# Patient Record
Sex: MALE | Race: WHITE | Employment: OTHER | URBAN - METROPOLITAN AREA
[De-identification: names, ages, dates, MRNs, and addresses within clinical notes are randomized per-mention and may not be internally consistent; named-entity substitution may affect disease eponyms.]

---

## 2017-01-03 ENCOUNTER — TELEPHONE (OUTPATIENT)
Dept: ONCOLOGY | Age: 82
End: 2017-01-03

## 2017-01-03 NOTE — TELEPHONE ENCOUNTER
[id number] [facility] Nutrition Therapy   [phone]      Nutrition Plan and Goals    Continue to emphasize PO intake  Aim for 64oz fluids     Goal: Weight maintenance   Nutrition Assessment   Called to check in on Mr. Loletta Felty. He reports having a good holiday and that his family has been good about nagging him to eat and drink. He reports eating well but unable to gain weight. He reports drinking more ensure and that the chocolate actually has more flavor. However, after further inquiry he is only drinking 1-2 a week. He reports this morning have pancakes, 2 large sausage patties, eggs and a glass of whole milk but then reports skipping lunch. He states Fermín Haji are you also so specific on amounts\". He states swallowing has been the best over the last 2 days. 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       Delores Rojas, [address] [place] [address], [address] [place] [address] , [address]

## 2017-01-18 ENCOUNTER — TELEPHONE (OUTPATIENT)
Dept: ONCOLOGY | Age: 82
End: 2017-01-18

## 2017-01-18 NOTE — TELEPHONE ENCOUNTER
[id number] [facility] Nutrition Therapy   [phone]      Nutrition Plan and Goals    Continue to emphasize PO intake  Aim for 64oz fluids     Goal: Weight maintenance   Nutrition Assessment   Called to check in on Mr. Suleiman Bustamante. PO intake: Eating 3 meals a day, back to normal size portions. Still struggling with altered taste. Reports no restrictions on food, denies difficulty swallowing. He is not consuming any supplements (ensure/boost)    Fluid intake: Reports drinking about 20-25 ounces of water per day and 15-20oz of whole milk daily. Also consuming coffee/tea. Weight: States he may have gained a pound. Denies weight loss. Bowels: Taking miralax frequently to promote regular BMs. Pain:  Reports left side discomfort in the back of his throat is bothersome. He went to the dentist last week and the dentist was not able to see any ulcer or irritated area. Mr. Suleiman Bustamante wonders if the ulcer is father back down towards the back of his tongue.     - He is gargling with salt water. Denies fevers. 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

## 2017-01-24 ENCOUNTER — TELEPHONE (OUTPATIENT)
Dept: ONCOLOGY | Age: 82
End: 2017-01-24

## 2017-01-24 NOTE — TELEPHONE ENCOUNTER
Spoke to patient to return call. He was having constipation problem again. He was able to get in touch with Papa Freedman who recommended laxative use. Will try Mg Citrate tomorrow morning. No nausea or vomiting. Seen PCP for gout, he recommended drinking more water. He is trying. He is feeling good overall. Getting metal taste in mouth, over 8 weeks since last treatment. This is disappointing for him. Encouraged him to call with any concerns. Reviewed f/u appointments. No changes needed.

## 2017-01-24 NOTE — TELEPHONE ENCOUNTER
Voicemail: 1/24/17 @ 3:20pm    Patient calling to get in touch with [employer] or "[employer]. No specifics left. Please call the patient back at [phone].

## 2017-01-25 NOTE — TELEPHONE ENCOUNTER
[id number] [facility] Nutrition Therapy   [phone]    Nutrition    Mr Yesenia Olivas called asking what the name of the laxative that was given to him by NP. Reviewed medications and suspected medication was OTC, reviewed medications to help with constipation. He has tried a stool softener and miralax. He picked up Magnesium citrate to try and after further discussion, he believes this is the laxative that was previously recommended. He went to his PCP about his gout and was encouraged to consume 64oz of fluids. He continues to have a metallic taste but reports eating and has gained 1-2lb.           Anais Baltazar, [address] [place] [address], [address] [place] [address] , [address]

## 2017-02-13 ENCOUNTER — HOSPITAL ENCOUNTER (OUTPATIENT)
Dept: PET IMAGING | Age: 82
Discharge: HOME | End: 2017-02-13
Attending: NURSE PRACTITIONER
Payer: MEDICARE

## 2017-02-13 VITALS — BODY MASS INDEX: 22.88 KG/M2 | HEIGHT: 68 IN | WEIGHT: 151 LBS

## 2017-02-13 DIAGNOSIS — C09.9 TONSIL CANCER (HCC): ICD-10-CM

## 2017-02-13 PROCEDURE — 78815 PET IMAGE W/CT SKULL-THIGH: CPT

## 2017-02-13 RX ORDER — SODIUM CHLORIDE 0.9 % (FLUSH) 0.9 %
10 SYRINGE (ML) INJECTION
Status: COMPLETED | OUTPATIENT
Start: 2017-02-13 | End: 2017-02-13

## 2017-02-13 RX ADMIN — Medication 10 ML: at 09:51

## 2017-02-13 NOTE — PROGRESS NOTES
Please let patient know PET scan looks great. No areas of abnormal hypermetabolism.  Will be reviewed in more detail at f/u in a few weeks

## 2017-02-14 ENCOUNTER — TELEPHONE (OUTPATIENT)
Dept: ONCOLOGY | Age: 82
End: 2017-02-14

## 2017-02-14 NOTE — TELEPHONE ENCOUNTER
----- Message from [place] NP sent at 2/13/2017  2:22 PM EST -----  Please let patient know PET scan looks great. No areas of abnormal hypermetabolism.  Will be reviewed in more detail at f/u in a few weeks

## 2017-02-14 NOTE — TELEPHONE ENCOUNTER
----- Message from Yanni Talbert NP sent at 2/13/2017  2:22 PM EST -----  Please let patient know PET scan looks great. No areas of abnormal hypermetabolism.  Will be reviewed in more detail at f/u in a few weeks

## 2017-02-20 ENCOUNTER — TELEPHONE (OUTPATIENT)
Dept: ONCOLOGY | Age: 82
End: 2017-02-20

## 2017-02-20 NOTE — TELEPHONE ENCOUNTER
Patient states that he had something \"pop up in his throat\" and is requesting to speak to [address]  about it.

## 2017-02-20 NOTE — TELEPHONE ENCOUNTER
Voicemail: 2/20/17 @ 9:21am    Patient calling to speak to [address] . No specifics given. Please call the patient back at [phone]. Thanks.

## 2017-02-20 NOTE — TELEPHONE ENCOUNTER
Spoke to patient. States seen by PCP, was diagnosed with infection in salivary glands. Started on antibiotic therapy. Inquired regarding f/u with Dr. Alex Salas. States he hasn't f/u since completion of therapy. Advised we need to get him f/u so she can evaluate as well. He has f/u with our office and Dr. Humberto Dietrich on Friday 10am with us, 1130 with Dr. Vickey Cooks. Call to Dr. January Murphy office. Able to get him in on Friday at 2:45. Call to patient, busy. Call back, left message regarding appt date/time with contact information to Dr. January Murphy office.

## 2017-02-21 ENCOUNTER — TELEPHONE (OUTPATIENT)
Dept: ONCOLOGY | Age: 82
End: 2017-02-21

## 2017-02-21 NOTE — TELEPHONE ENCOUNTER
[facility] Patient is okay with the appointment on Friday at 2:30 with Dr. Dennie Dada.

## 2017-02-24 ENCOUNTER — OFFICE VISIT (OUTPATIENT)
Dept: ONCOLOGY | Age: 82
End: 2017-02-24

## 2017-02-24 ENCOUNTER — TELEPHONE (OUTPATIENT)
Dept: ONCOLOGY | Age: 82
End: 2017-02-24

## 2017-02-24 VITALS
TEMPERATURE: 97.8 F | WEIGHT: 152.8 LBS | HEIGHT: 68 IN | OXYGEN SATURATION: 100 % | HEART RATE: 78 BPM | BODY MASS INDEX: 23.16 KG/M2 | SYSTOLIC BLOOD PRESSURE: 148 MMHG | RESPIRATION RATE: 16 BRPM | DIASTOLIC BLOOD PRESSURE: 89 MMHG

## 2017-02-24 DIAGNOSIS — C09.9 TONSIL CANCER (HCC): Primary | ICD-10-CM

## 2017-02-24 DIAGNOSIS — K86.2 PANCREATIC CYST: ICD-10-CM

## 2017-02-24 RX ORDER — PREDNISOLONE 15 MG/5ML
SOLUTION ORAL
COMMUNITY
Start: 2016-12-27 | End: 2017-04-25

## 2017-02-24 RX ORDER — AMOXICILLIN AND CLAVULANATE POTASSIUM 875; 125 MG/1; MG/1
TABLET, FILM COATED ORAL 2 TIMES DAILY
COMMUNITY
Start: 2017-02-20 | End: 2017-04-25 | Stop reason: ALTCHOICE

## 2017-02-24 RX ORDER — PREDNISONE 10 MG/1
TABLET ORAL
COMMUNITY
Start: 2017-01-20 | End: 2017-04-25

## 2017-02-24 RX ORDER — PREDNISONE 20 MG/1
TABLET ORAL
COMMUNITY
Start: 2017-01-16 | End: 2017-04-25

## 2017-02-24 NOTE — PROGRESS NOTES
Chief Complaint   Patient presents with    Other     Tonsil Cancer    Other     PET and CT scan results     1. Have you been to the ER, urgent care clinic since your last visit? Hospitalized since your last visit? No    2. Have you seen or consulted any other health care providers outside of the [address] [place] [address] since your last visit? Include any pap smears or colon screening.  Dr. Carli Cardona

## 2017-02-24 NOTE — PROGRESS NOTES
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 Shahnaz Saldana is a 80 y.o. male who presents for follow-up of tonsil cancer. In summer 2016 he noticed a mass in his left neck. He saw Dr. Cheryl Shi in ENT. After a trial of antibiotics he had an FNA revealing SCC in 7/20/16. Quit smoking in 2016, but was never a heavy smoker. He had treatment as above. Presents today for follow-up. Hospitalized from 11/25/16-12/1/16 due to dehydration. Feeling much better since hospitalization. Appetite has been picking up. Mild fatigue. Some insomnia. It is difficult to get food in due to decrease in saliva. No cough, SOB. Mild SOB. No pain. Otherwise, complete ROS is per the symptom report form which has been scanned into the media section of the electronic medical record. Past Medical History:   Diagnosis Date    GERD (gastroesophageal reflux disease)     Raynaud disease     Tonsil cancer ([facility] [address]        Past Surgical History:   Procedure Laterality Date    HX GI      colonscopy    HX HEENT      HX OTHER SURGICAL      Bilateral Inguinal Hernias    HX TONSILLECTOMY      August 2016    HX UROLOGICAL      vasectomy    HX VASCULAR ACCESS         Allergies   Allergen Reactions    Talwin [Pentazocine Lactate] Unknown (comments)     Cant remember, happened 40 years ago       Current Outpatient Prescriptions   Medication Sig Dispense Refill    amoxicillin-clavulanate (AUGMENTIN) 875-125 mg per tablet two (2) times a day.  artificial saliva (MOUTH KOTE) spra Take 1 Spray by mouth as needed for Other. 240 mL 1    lidocaine (XYLOCAINE) 2 % solution Take 15 mL by mouth as needed for Pain.  Indications: MOUTH IRRITATION, chemoradiation induced mucopharyngitis 1 Bottle 2    polyethylene glycol (MIRALAX) 17 gram packet Take 1 Packet by mouth daily. 30 Packet 2    magnesium oxide 400 mg cap Take 400 mg by mouth daily. 30 Cap 1    zolpidem (AMBIEN) 10 mg tablet Take 10 mg by mouth nightly as needed for Sleep.  prednisoLONE (PRELONE) 15 mg/5 mL syrup       predniSONE (DELTASONE) 20 mg tablet       predniSONE (DELTASONE) 10 mg tablet       aluminum-magnesium hydroxide 200-200 mg/5 mL susp 30 mL, diphenhydrAMINE 12.5 mg/5 mL elix 75 mg, lidocaine 2 % soln 30 mL oral suspension (compounded) Take 5 mL by mouth Before breakfast, lunch, dinner and at bedtime. 1 Bottle 2       Social History     Social History    Marital status:      Spouse name: N/A    Number of children: N/A    Years of education: N/A     Social History Main Topics    Smoking status: Former Smoker    Smokeless tobacco: Never Used    Alcohol use Yes    Drug use: None    Sexual activity: Not Asked     Other Topics Concern    None     Social History Narrative       Family History   Problem Relation Age of Onset    Dementia Mother     Parkinson's Disease Father        ROS  As per the HPI, otherwise a comprehensive ROS is negative.   ECOG PS is 1    Physical Examination:   Visit Vitals    /89 (BP 1 Location: Left arm, BP Patient Position: Sitting)    Pulse 78    Temp 97.8 °F (36.6 °C) (Oral)    Resp 16    Ht 5' 8\" (1.727 m)    Wt 152 lb 12.8 oz (69.3 kg)    SpO2 100%    BMI 23.23 kg/m2     General appearance - alert, well appearing, and in no distress  Mental status - oriented to person, place, and time  Mouth - mucous membranes moist, no sign of cancer  Neck - supple, no adenopathy, there is some fullness in the submental area  Chest - clear to auscultation, no wheezes, rales or rhonchi, symmetric air entry  Heart - normal rate, regular rhythm, normal S1, S2, no murmurs, rubs, clicks or gallops  Abdomen - soft, nontender, nondistended, bowel sounds present  Neurological - normal speech, no focal findings or movement disorder noted  Musculoskeletal - no joint tenderness, deformity or swelling  Extremities - peripheral pulses normal, no pedal edema, no clubbing or cyanosis  Skin - normal coloration and turgor, no rashes, no suspicious skin lesions noted    LABS  Lab Results   Component Value Date/Time    WBC 4.1 11/27/2016 06:53 AM    HGB 9.5 11/27/2016 06:53 AM    HCT 26.7 11/27/2016 06:53 AM    PLATELET 178 48/43/2817 06:53 AM    MCV 89.9 11/27/2016 06:53 AM     Lab Results   Component Value Date/Time    Sodium 137 12/01/2016 09:02 AM    Potassium 3.9 12/01/2016 09:02 AM    Chloride 100 12/01/2016 09:02 AM    CO2 29 12/01/2016 09:02 AM    Anion gap 8 12/01/2016 09:02 AM    Glucose 90 12/01/2016 09:02 AM    BUN 7 12/01/2016 09:02 AM    Creatinine 0.67 12/01/2016 09:02 AM    BUN/Creatinine ratio 10 12/01/2016 09:02 AM    GFR est AA >60 12/01/2016 09:02 AM    GFR est non-AA >60 12/01/2016 09:02 AM    Calcium 7.8 12/01/2016 09:02 AM    Bilirubin, total 0.3 11/26/2016 02:32 AM    AST (SGOT) 18 11/26/2016 02:32 AM    Alk. 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 Margins are close, but negative. T1.   FNA of left neck mass on 7/20/16 with squamous cell carcinoma. P16 positive. IMAGING  PET on 2/13/17 with no foci of abnormal hypermetabolism. There is a 1.5 cm cystic lesion projected off the body of the pancreas. PET on 8/2/16 with increased metabolic activity in the enlarged left level 2 lymph node with SUV of 11 suspicious for metastatic disease. There is minimal asymmetric increased activity in the left palatine tonsil compared to the right without an obvious mass.     ASSESSMENT  Mr. Leatha Quiros is a 80 y.o. 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 DISCUSSION/PLAN  1. Tonsil cancer. PET scan from 2/13/17 reviewed in detail. No sign of recurrence clinically or on the PET scan. Sees ENT and rad onc today. Discussed routine surveillance. 2. Weight loss. Supportive visit with dietician in office. Recommendations reviewed. 3. Pancreatic cyst.  Reviewed PET in detail and looked at the images with the patient. This has grown since his last PET. I have ordered a triple phase CT and referred to GI. Of note, there is a CT in his chart from June 2015 which comments on a 5mm pancreatic cyst.  Unfortunately, this is the wrong Charlsie Meme. I have asked that the CT be removed. However, the current PET scan seems to be the correct patient. 4. Port. Will keep this in until we sort out what is going on with his pancreas. He is eager to get it out, though. Not interested in flushes right now. Follow up in 2 months, sooner if needed.     Henry Eubanks MD

## 2017-02-24 NOTE — PROGRESS NOTES
[id number] [facility] Therapy   [phone]      Nutrition Plan and Goals    Continue to emphasize PO intake  Aim for 64oz fluids   SLP referral     Goal: Weight maintenance   Nutrition Assessment   Met with patient and wife this morning. PO intake: Over the past week, he reports his intake has declined due to salvary gland infection. He was started on antibiotics earlier this week. He reports increased swelling under his chin, does not cause difficulty swallowing just slows down his PO intake. He is still attempting 3 meals a day but portion sizes have decreased. He continues to drink about 16oz of whole milk per day or more. He reports difficulty with chewing/swallowing due to lack of salivary. Reports he feels like he is choking more often on liquids and saliva. He is not consuming any supplements (ensure/boost). Reports yesterday having oatmeal for breakfast, a sandwich for lunch (cannot recall details) and roast beef with potatoes and roll for dinner. Fluid intake: Reports drinking about 40-50 ounces of water per day and 15oz of whole milk daily. Also consuming coffee/tea. Weight: He has gained 6# in the past 2 months. 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

## 2017-03-14 ENCOUNTER — TELEPHONE (OUTPATIENT)
Dept: ONCOLOGY | Age: 82
End: 2017-03-14

## 2017-03-14 NOTE — TELEPHONE ENCOUNTER
[address] [facility] Nutrition Therapy   [phone]    Nutrition    Called and left a message with Mr. Kerrie Uribe. Provided him with the number for [facility] speech therapy ([phone]). Called [facility] to see if patient scheduled appointment, he has not. They did not receive referral from February but do have referral from October and still valid. Will most recent notes to [address] [place] [address]. Plan:  Patient to call and schedule appointment with SLP for dysphagia and post treatment assessment.       Emir Talbert, [address], [address] [place] , [address]

## 2017-03-17 ENCOUNTER — HOSPITAL ENCOUNTER (OUTPATIENT)
Dept: MRI IMAGING | Age: 82
Discharge: HOME | End: 2017-03-17
Attending: INTERNAL MEDICINE
Payer: MEDICARE

## 2017-03-17 DIAGNOSIS — K86.2 PANCREATIC CYST: ICD-10-CM

## 2017-03-17 LAB — CREAT BLD-MCNC: 0.9 MG/DL (ref 0.6–1.3)

## 2017-03-17 PROCEDURE — 82565 ASSAY OF CREATININE: CPT

## 2017-03-17 PROCEDURE — 74011250636 HC RX REV CODE- 250/636: Performed by: INTERNAL MEDICINE

## 2017-03-17 PROCEDURE — 74011000258 HC RX REV CODE- 258: Performed by: INTERNAL MEDICINE

## 2017-03-17 PROCEDURE — A9585 GADOBUTROL INJECTION: HCPCS | Performed by: INTERNAL MEDICINE

## 2017-03-17 PROCEDURE — 74182 MRI ABDOMEN W/CONTRAST: CPT

## 2017-03-17 RX ADMIN — SODIUM CHLORIDE 100 ML: 900 INJECTION, SOLUTION INTRAVENOUS at 18:00

## 2017-03-17 RX ADMIN — GADOBUTROL 7.5 ML: 604.72 INJECTION INTRAVENOUS at 18:00

## 2017-03-21 ENCOUNTER — TELEPHONE (OUTPATIENT)
Dept: ONCOLOGY | Age: 82
End: 2017-03-21

## 2017-03-21 ENCOUNTER — DOCUMENTATION ONLY (OUTPATIENT)
Dept: ONCOLOGY | Age: 82
End: 2017-03-21

## 2017-03-21 NOTE — TELEPHONE ENCOUNTER
[facility], they need a diagnostic code on the rx that was faxed over.  Then need it re faxed [phone]

## 2017-03-21 NOTE — TELEPHONE ENCOUNTER
Pt needs a rx for speech at [facility] rx needs to say    speech Eval and treat  please  fax to  [phone]

## 2017-04-13 ENCOUNTER — TELEPHONE (OUTPATIENT)
Dept: ONCOLOGY | Age: 82
End: 2017-04-13

## 2017-04-18 ENCOUNTER — TELEPHONE (OUTPATIENT)
Dept: ONCOLOGY | Age: 82
End: 2017-04-18

## 2017-04-18 NOTE — TELEPHONE ENCOUNTER
Returned fax to [address] [place] [address]. [facility] 53 OP Charting Report.   Front to scan

## 2017-04-19 ENCOUNTER — TELEPHONE (OUTPATIENT)
Dept: ONCOLOGY | Age: 82
End: 2017-04-19

## 2017-04-19 NOTE — TELEPHONE ENCOUNTER
HIPAA verified    Last procedure was MRI. We wanted to follow up on the tail of pancrease. He saw Ligia Daly. Opted to wait. He wanted to speak to Ligia Daly later. Never heard from Ligia Daly, it's been a week. He has called twice. Advised I would help facilitate that appointment. Call to Ligia Daly office [phone]. Left message with scheduling after speaking to . Asked them to call patient to schedule.

## 2017-04-19 NOTE — TELEPHONE ENCOUNTER
Patient is calling again regarding miscommunication with our doctors here and his primary care physician.    Thank you,  Elsy Ferreira

## 2017-04-19 NOTE — TELEPHONE ENCOUNTER
Patient called in response to last call. He understands he should be expecting a call from Dr. Domínguez Blend office for scheduling. He requests results of MRCP to be faxed to PCP as well, will send this over now. Office f/u here next week Tuesday.

## 2017-04-25 ENCOUNTER — OFFICE VISIT (OUTPATIENT)
Dept: ONCOLOGY | Age: 82
End: 2017-04-25

## 2017-04-25 ENCOUNTER — HOSPITAL ENCOUNTER (OUTPATIENT)
Dept: INFUSION THERAPY | Age: 82
Discharge: HOME | End: 2017-04-25
Payer: MEDICARE

## 2017-04-25 VITALS
SYSTOLIC BLOOD PRESSURE: 151 MMHG | OXYGEN SATURATION: 98 % | HEART RATE: 85 BPM | DIASTOLIC BLOOD PRESSURE: 84 MMHG | TEMPERATURE: 97.5 F | RESPIRATION RATE: 16 BRPM | BODY MASS INDEX: 23.01 KG/M2 | HEIGHT: 68 IN | WEIGHT: 151.8 LBS

## 2017-04-25 VITALS
TEMPERATURE: 96.8 F | DIASTOLIC BLOOD PRESSURE: 80 MMHG | HEART RATE: 69 BPM | SYSTOLIC BLOOD PRESSURE: 148 MMHG | RESPIRATION RATE: 16 BRPM

## 2017-04-25 DIAGNOSIS — K86.89 PANCREATIC MASS: ICD-10-CM

## 2017-04-25 DIAGNOSIS — C09.9 TONSIL CANCER (HCC): Primary | ICD-10-CM

## 2017-04-25 DIAGNOSIS — R43.2 DYSGEUSIA: ICD-10-CM

## 2017-04-25 LAB
ALBUMIN SERPL BCP-MCNC: 3.9 G/DL (ref 3.5–5)
ALBUMIN/GLOB SERPL: 1.3 {RATIO} (ref 1.1–2.2)
ALP SERPL-CCNC: 76 U/L (ref 45–117)
ALT SERPL-CCNC: 13 U/L (ref 12–78)
ANION GAP BLD CALC-SCNC: 4 MMOL/L (ref 5–15)
AST SERPL W P-5'-P-CCNC: 12 U/L (ref 15–37)
BASOPHILS # BLD AUTO: 0 K/UL (ref 0–0.1)
BASOPHILS # BLD: 0 % (ref 0–1)
BILIRUB SERPL-MCNC: 0.3 MG/DL (ref 0.2–1)
BUN SERPL-MCNC: 25 MG/DL (ref 6–20)
BUN/CREAT SERPL: 27 (ref 12–20)
CALCIUM SERPL-MCNC: 9.5 MG/DL (ref 8.5–10.1)
CHLORIDE SERPL-SCNC: 101 MMOL/L (ref 97–108)
CO2 SERPL-SCNC: 31 MMOL/L (ref 21–32)
CREAT SERPL-MCNC: 0.91 MG/DL (ref 0.7–1.3)
EOSINOPHIL # BLD: 0.1 K/UL (ref 0–0.4)
EOSINOPHIL NFR BLD: 1 % (ref 0–7)
ERYTHROCYTE [DISTWIDTH] IN BLOOD BY AUTOMATED COUNT: 13.3 % (ref 11.5–14.5)
GLOBULIN SER CALC-MCNC: 3 G/DL (ref 2–4)
GLUCOSE SERPL-MCNC: 104 MG/DL (ref 65–100)
HCT VFR BLD AUTO: 38.7 % (ref 36.6–50.3)
HGB BLD-MCNC: 13.4 G/DL (ref 12.1–17)
LYMPHOCYTES # BLD AUTO: 17 % (ref 12–49)
LYMPHOCYTES # BLD: 0.9 K/UL (ref 0.8–3.5)
MCH RBC QN AUTO: 31.9 PG (ref 26–34)
MCHC RBC AUTO-ENTMCNC: 34.6 G/DL (ref 30–36.5)
MCV RBC AUTO: 92.1 FL (ref 80–99)
MONOCYTES # BLD: 0.3 K/UL (ref 0–1)
MONOCYTES NFR BLD AUTO: 5 % (ref 5–13)
NEUTS SEG # BLD: 4.3 K/UL (ref 1.8–8)
NEUTS SEG NFR BLD AUTO: 77 % (ref 32–75)
PLATELET # BLD AUTO: 241 K/UL (ref 150–400)
POTASSIUM SERPL-SCNC: 4.1 MMOL/L (ref 3.5–5.1)
PROT SERPL-MCNC: 6.9 G/DL (ref 6.4–8.2)
RBC # BLD AUTO: 4.2 M/UL (ref 4.1–5.7)
SODIUM SERPL-SCNC: 136 MMOL/L (ref 136–145)
WBC # BLD AUTO: 5.6 K/UL (ref 4.1–11.1)

## 2017-04-25 PROCEDURE — 74011250636 HC RX REV CODE- 250/636

## 2017-04-25 PROCEDURE — 36591 DRAW BLOOD OFF VENOUS DEVICE: CPT

## 2017-04-25 PROCEDURE — 77030012965 HC NDL HUBR BBMI -A

## 2017-04-25 PROCEDURE — 80053 COMPREHEN METABOLIC PANEL: CPT

## 2017-04-25 PROCEDURE — 36415 COLL VENOUS BLD VENIPUNCTURE: CPT

## 2017-04-25 PROCEDURE — 85025 COMPLETE CBC W/AUTO DIFF WBC: CPT

## 2017-04-25 PROCEDURE — 74011000250 HC RX REV CODE- 250

## 2017-04-25 RX ORDER — ZOLPIDEM TARTRATE 10 MG/1
10 TABLET ORAL
COMMUNITY
Start: 2009-12-10 | End: 2017-04-25 | Stop reason: SDUPTHER

## 2017-04-25 RX ORDER — SODIUM CHLORIDE 9 MG/ML
10 INJECTION INTRAMUSCULAR; INTRAVENOUS; SUBCUTANEOUS AS NEEDED
Status: ACTIVE | OUTPATIENT
Start: 2017-04-25 | End: 2017-04-26

## 2017-04-25 RX ORDER — IBUPROFEN 200 MG
200 TABLET ORAL
COMMUNITY
Start: 2014-05-30

## 2017-04-25 RX ORDER — ACETAMINOPHEN/DIPHENHYDRAMINE 500MG-25MG
TABLET ORAL
COMMUNITY
Start: 2010-04-08 | End: 2018-07-30 | Stop reason: SDUPTHER

## 2017-04-25 RX ORDER — HEPARIN 100 UNIT/ML
500 SYRINGE INTRAVENOUS AS NEEDED
Status: ACTIVE | OUTPATIENT
Start: 2017-04-25 | End: 2017-04-26

## 2017-04-25 RX ORDER — FAMOTIDINE 20 MG/1
TABLET, FILM COATED ORAL
COMMUNITY
Start: 2012-01-17 | End: 2017-04-25

## 2017-04-25 RX ORDER — PILOCARPINE HYDROCHLORIDE 5 MG/1
TABLET, FILM COATED ORAL
COMMUNITY
Start: 2017-03-01 | End: 2017-04-25

## 2017-04-25 RX ORDER — SODIUM CHLORIDE 0.9 % (FLUSH) 0.9 %
10-40 SYRINGE (ML) INJECTION AS NEEDED
Status: ACTIVE | OUTPATIENT
Start: 2017-04-25 | End: 2017-04-26

## 2017-04-25 RX ADMIN — Medication 500 UNITS: at 10:15

## 2017-04-25 RX ADMIN — Medication 20 ML: at 10:15

## 2017-04-25 RX ADMIN — SODIUM CHLORIDE 10 ML: 9 INJECTION INTRAMUSCULAR; INTRAVENOUS; SUBCUTANEOUS at 10:15

## 2017-04-25 NOTE — PROGRESS NOTES
Outpatient [facility]fusion [facility] Short Visit Progress Note    0161 Pt admit to [facility] for port flush with labs ambulatory in stable condition. Assessment completed. No new concerns voiced. Please review pending lab results in [address] [place] [address]. Visit Vitals    /80 (BP 1 Location: Right arm, BP Patient Position: Sitting)    Pulse 69    Temp 96.8 °F (36 °C)    Resp 16       Port accessed with positive blood return. Lab drawn, flushed, heparinized and de-accessed per protocol. Medications:  NS flushes  Heparin    1020 Pt tolerated treatment well. D/c home ambulatory in no distress. There are no other appointments scheduled at this time.

## 2017-04-25 NOTE — PROGRESS NOTES
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 Jose Cuello is a 80 y.o. male who presents for follow-up of tonsil cancer. In summer 2016 he noticed a mass in his left neck. He saw Dr. Hunter Butcher in ENT. After a trial of antibiotics he had an FNA revealing SCC in 7/20/16. Quit smoking in 2016, but was never a heavy smoker. He had treatment as above. Presents today for follow-up. Still struggles with eating. Breakfast usually goes down well. Eats grits and eggs, toast and sausage or aguayo with coffee. Lunch is usually meat and cheese sandwich on white bread with elijah slaw or potatoe salad. Usually whole milk with each meal. Usually 2-20oz water a day as well. Struggles with dinner. Food is usually too dry. Meat is hard for him to get down. Decreased saliva production and taste changes continue since radiation. Weight has been stable. He is happy with weight. No mouth sores. Aching muscle pain at times in left side of neck. Seen by Dr. Hunter Butcher on 2/24/17 who did look into the back of throat. No issues. Had MRCP under the care of Dr. Radha Mackenzie due to pancreatic tail mass. He is planning biopsy. Otherwise, complete ROS is per the symptom report form which has been scanned into the media section of the electronic medical record.       Past Medical History:   Diagnosis Date    GERD (gastroesophageal reflux disease)     Raynaud disease     Tonsil cancer ([facility] [address]        Past Surgical History:   Procedure Laterality Date    HX GI      colonscopy    HX HEENT      HX OTHER SURGICAL      Bilateral Inguinal Hernias    HX TONSILLECTOMY      August 2016    HX UROLOGICAL      vasectomy    HX VASCULAR ACCESS         Allergies   Allergen Reactions    Talwin [Pentazocine Lactate] Unknown (comments)     Cant remember, happened 40 years ago       Current Outpatient Prescriptions   Medication Sig Dispense Refill    diphenhydrAMINE-acetaminophen  mg tab TYLENOL  PM TABS (ACETAMINOPHEN TABS)      ibuprofen (ADVIL) 200 mg tablet 200 mg.      artificial saliva (MOUTH KOTE) spra Take 1 Spray by mouth as needed for Other. 240 mL 1    aluminum-magnesium hydroxide 200-200 mg/5 mL susp 30 mL, diphenhydrAMINE 12.5 mg/5 mL elix 75 mg, lidocaine 2 % soln 30 mL oral suspension (compounded) Take 5 mL by mouth Before breakfast, lunch, dinner and at bedtime. 1 Bottle 2    polyethylene glycol (MIRALAX) 17 gram packet Take 1 Packet by mouth daily. 30 Packet 2    magnesium oxide 400 mg cap Take 400 mg by mouth daily. 30 Cap 1    zolpidem (AMBIEN) 10 mg tablet Take 10 mg by mouth nightly as needed for Sleep. Facility-Administered Medications Ordered in Other Visits   Medication Dose Route Frequency Provider Last Rate Last Dose    sodium chloride 0.9 % injection 10 mL  10 mL IntraVENous PRN Zaina Bates MD   10 mL at 04/25/17 1015    heparin (porcine) pf 500 Units  500 Units IntraVENous PRN Zaina Bates MD   500 Units at 04/25/17 1015    sodium chloride (NS) flush 10-40 mL  10-40 mL IntraVENous PRN Zaina Bates MD   20 mL at 04/25/17 1015       Social History     Social History    Marital status:      Spouse name: N/A    Number of children: N/A    Years of education: N/A     Social History Main Topics    Smoking status: Former Smoker    Smokeless tobacco: Never Used    Alcohol use Yes    Drug use: None    Sexual activity: Not Asked     Other Topics Concern    None     Social History Narrative       Family History   Problem Relation Age of Onset    Dementia Mother     Parkinson's Disease Father        ROS  As per the HPI, otherwise a comprehensive ROS is negative.   ECOG PS is 1    Physical Examination:   Visit Vitals    /84 (BP 1 Location: Left arm, BP Patient Position: Sitting)    Pulse 85    Temp 97.5 °F (36.4 °C) (Oral)    Resp 16    Ht 5' 8\" (1.727 m)    Wt 151 lb 12.8 oz (68.9 kg)    SpO2 98%    BMI 23.08 kg/m2     General appearance - alert, well appearing, and in no distress  Mental status - oriented to person, place, and time  Mouth - mucous membranes moist, no sign of cancer  Neck - supple, no adenopathy, there is some fullness in the submental area  Chest - clear to auscultation, no wheezes, rales or rhonchi, symmetric air entry  Heart - normal rate, regular rhythm, normal S1, S2, no murmurs, rubs, clicks or gallops  Abdomen - soft, nontender, nondistended, bowel sounds present  Neurological - normal speech, no focal findings or movement disorder noted  Musculoskeletal - no joint tenderness, deformity or swelling  Extremities - peripheral pulses normal, no pedal edema, no clubbing or cyanosis  Skin - normal coloration and turgor, no rashes, no suspicious skin lesions noted    LABS  Lab Results   Component Value Date/Time    WBC 5.6 04/25/2017 10:22 AM    HGB 13.4 04/25/2017 10:22 AM    HCT 38.7 04/25/2017 10:22 AM    PLATELET 479 20/36/3987 10:22 AM    MCV 92.1 04/25/2017 10:22 AM     Lab Results   Component Value Date/Time    Sodium 136 04/25/2017 10:22 AM    Potassium 4.1 04/25/2017 10:22 AM    Chloride 101 04/25/2017 10:22 AM    CO2 31 04/25/2017 10:22 AM    Anion gap 4 04/25/2017 10:22 AM    Glucose 104 04/25/2017 10:22 AM    BUN 25 04/25/2017 10:22 AM    Creatinine 0.91 04/25/2017 10:22 AM    BUN/Creatinine ratio 27 04/25/2017 10:22 AM    GFR est AA >60 04/25/2017 10:22 AM    GFR est non-AA >60 04/25/2017 10:22 AM    Calcium 9.5 04/25/2017 10:22 AM    Bilirubin, total 0.3 04/25/2017 10:22 AM    AST (SGOT) 12 04/25/2017 10:22 AM    Alk.  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 Margins are close, but negative. T1.   FNA of left neck mass on 7/20/16 with squamous cell carcinoma. P16 positive. IMAGING  PET on 2/13/17 with no foci of abnormal hypermetabolism. There is a 1.5 cm cystic lesion projected off the body of the pancreas. PET on 8/2/16 with increased metabolic activity in the enlarged left level 2 lymph node with SUV of 11 suspicious for metastatic disease. There is minimal asymmetric increased activity in the left palatine tonsil compared to the right without an obvious mass. ASSESSMENT  Mr. Frank Tony is a 80 y.o. 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 DISCUSSION/PLAN  1. Tonsil cancer. PET scan from 2/13/17 with no residual disease. Continues to recover from side effects of treatment. Seen by Dr. Karrie Bolaños in Feb.     Continue surveillance of disease. Due for labs today. 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 Hope to see some improvement after continued time off therapy. Discussed that I would not expect symptoms to return to baseline. There will be residual taste changes and dry mouth that he will deal with the rest of his life. 3. Pancreatic cyst.  Reviewed MRI. Plan to have biopsy under the care of Dr. Ash Howell. 4. Port. Will keep this in until we sort out what is going on with his pancreas. Port flush today with labs. Follow up in 3 months. Seen in conjunction with Adonis Luong NP.     Janis Bay MD

## 2017-05-03 ENCOUNTER — TELEPHONE (OUTPATIENT)
Dept: ONCOLOGY | Age: 82
End: 2017-05-03

## 2017-05-18 ENCOUNTER — HOSPITAL ENCOUNTER (OUTPATIENT)
Dept: GENERAL RADIOLOGY | Age: 82
Discharge: HOME | End: 2017-05-18
Attending: FAMILY MEDICINE
Payer: MEDICARE

## 2017-05-18 DIAGNOSIS — R13.10 DYSPHAGIA: ICD-10-CM

## 2017-05-18 PROCEDURE — G8996 SWALLOW CURRENT STATUS: HCPCS

## 2017-05-18 PROCEDURE — 92611 MOTION FLUOROSCOPY/SWALLOW: CPT | Performed by: SPEECH-LANGUAGE PATHOLOGIST

## 2017-05-18 PROCEDURE — G8997 SWALLOW GOAL STATUS: HCPCS

## 2017-05-18 PROCEDURE — 74230 X-RAY XM SWLNG FUNCJ C+: CPT

## 2017-05-18 PROCEDURE — G8998 SWALLOW D/C STATUS: HCPCS

## 2017-05-18 NOTE — PROGRESS NOTES
[address] [place] [address]  [id number]    Speech Pathology Modified barium swallow Study with cms g codes  Patient: Rosalio Sebastian (29 y.o. male)  Date: 5/18/2017  Referring Provider:  Dr. Ayleen Chavez:   Patient alert, cooperative. Reports he had XRT for tonsil cancer that ended in November 2016. Reports he has overall done well with swallowing, however his saliva production and taste have been significantly impacted. Reports he occasionally gets choked with water, but overall just does not enjoy eating. Has been seen by OP SLP with [facility] and given laryngeal strengthening exercises, however he reports he is only doing them about once a week. MBS study was requested to assess for oropharyngeal dysphagia. OBJECTIVE:   Past Medical History:   Past Medical History:   Diagnosis Date    GERD (gastroesophageal reflux disease)     Raynaud disease     Tonsil cancer ([facility] [address]      Past Surgical History:   Procedure Laterality Date    HX GI      colonscopy    HX HEENT      HX OTHER SURGICAL      Bilateral Inguinal Hernias    HX TONSILLECTOMY      August 2016    HX UROLOGICAL      vasectomy    HX VASCULAR ACCESS       Current Dietary Status:  Regular   Radiologist: Dr. Duron Drop: Lateral;Fluoro  Patient Position: upright in hausted chair     Trial 1:   Consistency Presented: Thin liquid;Puree; Solid   How Presented: Self-fed/presented;Cup/sip;Cup/gulp;Straw;Successive swallows;Spoon   Consistency Amount:  (200cc thin Ba, 1 tbsp Ba paste )   Bolus Acceptance: No impairment   Bolus Formation/Control: No impairment:     Propulsion: No impairment   Oral Residue: None   Initiation of Swallow: No impairment   Timing: No impairment   Penetration: None   Aspiration/Timing: No evidence of aspiration   Pharyngeal Clearance: Vallecular residue;Pyriform residue ; Less than 10% (trace coating, functional for age )           Decreased Tongue Base Retraction?: No  Laryngeal Elevation: WFL (within functional limits)  Aspiration/Penetration Score: 1 (No penetration or aspiration-Contrast does not enter the airway)  Pharyngeal Symmetry: Not assessed  Pharyngeal-Esophageal Segment: No impairment  Pharyngeal Dysfunction: None    Oral Phase Severity: No impairment  Pharyngeal Phase Severity: N/A    In compliance with CMSs Claims Based Outcome Reporting, the following G-code set was chosen for this patient based the use of the NOMS functional outcome to quantify this patient's level of swallowing impairment. Using the NOMS, the patient was determined to be at level 7 for swallow function which correlates with the CH= 0% level of severity. Based on the objective assessment provided within this note, the current, goal, and discharge g-codes are as follows:    Swallow  Swallowing:   Swallow Current Status CH= 0%   Swallow Goal Status CH= 0%   Swallow D/C Status CH= 0%      NOMS Swallowing Levels:  Level 1 (CN): NPO  Level 2 (CM): NPO but takes consistency in therapy  Level 3 (CL): Takes less than 50% of nutrition p.o. and continues with nonoral feedings; and/or safe with mod cues; and/or max diet restriction  Level 4 (CK): Safe swallow but needs mod cues; and/or mod diet restriction; and/or still requires some nonoral feeding/supplements  Level 5 (CJ): Safe swallow with min diet restriction; and/or needs min cues  Level 6 (CI): Independent with p.o.; rare cues; usually self cues; may need to avoid some foods or needs extra time  Level 7 ([address] [place] [address]): Independent for all p.o.  CUCA. (2003). National Outcomes Measurement System (NOMS): Adult Speech-Language Pathology User's Guide. ASSESSMENT :  Based on the objective data described above, the patient presents with no oral or pharyngeal dysphagia. Timely and complete mastication, timely swallow initiation, and functional tongue base retraction, hyolaryngeal elevation/excursion and epiglottic inversion.   Patient did have trace pharyngeal residue in the valleculae and pyriform that was overall functional for age and cleared immediately with an additional swallow. No penetration or aspiration observed. PLAN/RECOMMENDATIONS :  --continue regular diet. Educated patient on benefit of continued completion of laryngeal strengthening exercises to prevent dysphagia as a result of late effects of XRT. Patient verbalized understanding. COMMUNICATION/EDUCATION:   The above findings and recommendations were discussed with: patient  who verbalized understanding. Thank you for this referral.  Kim Jessica M.CD.  CCC-SLP   Time Calculation: 20 mins

## 2017-05-23 ENCOUNTER — APPOINTMENT (OUTPATIENT)
Dept: INFUSION THERAPY | Age: 82
End: 2017-05-23

## 2017-06-20 ENCOUNTER — HOSPITAL ENCOUNTER (OUTPATIENT)
Dept: INFUSION THERAPY | Age: 82
End: 2017-06-20
Payer: MEDICARE

## 2017-06-21 ENCOUNTER — HOSPITAL ENCOUNTER (OUTPATIENT)
Dept: INFUSION THERAPY | Age: 82
Discharge: HOME | End: 2017-06-21
Payer: MEDICARE

## 2017-06-21 ENCOUNTER — TELEPHONE (OUTPATIENT)
Dept: ONCOLOGY | Age: 82
End: 2017-06-21

## 2017-06-21 VITALS
RESPIRATION RATE: 16 BRPM | SYSTOLIC BLOOD PRESSURE: 143 MMHG | DIASTOLIC BLOOD PRESSURE: 85 MMHG | TEMPERATURE: 96.9 F | HEART RATE: 73 BPM

## 2017-06-21 LAB
ALBUMIN SERPL BCP-MCNC: 3.7 G/DL (ref 3.5–5)
ALBUMIN/GLOB SERPL: 1.1 {RATIO} (ref 1.1–2.2)
ALP SERPL-CCNC: 76 U/L (ref 45–117)
ALT SERPL-CCNC: 11 U/L (ref 12–78)
ANION GAP BLD CALC-SCNC: 7 MMOL/L (ref 5–15)
AST SERPL W P-5'-P-CCNC: 16 U/L (ref 15–37)
BASOPHILS # BLD AUTO: 0 K/UL (ref 0–0.1)
BASOPHILS # BLD: 0 % (ref 0–1)
BILIRUB SERPL-MCNC: 0.5 MG/DL (ref 0.2–1)
BUN SERPL-MCNC: 20 MG/DL (ref 6–20)
BUN/CREAT SERPL: 21 (ref 12–20)
CALCIUM SERPL-MCNC: 8.8 MG/DL (ref 8.5–10.1)
CHLORIDE SERPL-SCNC: 103 MMOL/L (ref 97–108)
CO2 SERPL-SCNC: 28 MMOL/L (ref 21–32)
CREAT SERPL-MCNC: 0.94 MG/DL (ref 0.7–1.3)
EOSINOPHIL # BLD: 0.1 K/UL (ref 0–0.4)
EOSINOPHIL NFR BLD: 2 % (ref 0–7)
ERYTHROCYTE [DISTWIDTH] IN BLOOD BY AUTOMATED COUNT: 14.4 % (ref 11.5–14.5)
GLOBULIN SER CALC-MCNC: 3.3 G/DL (ref 2–4)
GLUCOSE SERPL-MCNC: 84 MG/DL (ref 65–100)
HCT VFR BLD AUTO: 35.8 % (ref 36.6–50.3)
HGB BLD-MCNC: 12.4 G/DL (ref 12.1–17)
LYMPHOCYTES # BLD AUTO: 20 % (ref 12–49)
LYMPHOCYTES # BLD: 1.2 K/UL (ref 0.8–3.5)
MCH RBC QN AUTO: 32 PG (ref 26–34)
MCHC RBC AUTO-ENTMCNC: 34.6 G/DL (ref 30–36.5)
MCV RBC AUTO: 92.3 FL (ref 80–99)
MONOCYTES # BLD: 0.4 K/UL (ref 0–1)
MONOCYTES NFR BLD AUTO: 6 % (ref 5–13)
NEUTS SEG # BLD: 4.2 K/UL (ref 1.8–8)
NEUTS SEG NFR BLD AUTO: 72 % (ref 32–75)
PLATELET # BLD AUTO: 257 K/UL (ref 150–400)
POTASSIUM SERPL-SCNC: 4 MMOL/L (ref 3.5–5.1)
PROT SERPL-MCNC: 7 G/DL (ref 6.4–8.2)
RBC # BLD AUTO: 3.88 M/UL (ref 4.1–5.7)
SODIUM SERPL-SCNC: 138 MMOL/L (ref 136–145)
WBC # BLD AUTO: 5.8 K/UL (ref 4.1–11.1)

## 2017-06-21 PROCEDURE — 74011250636 HC RX REV CODE- 250/636

## 2017-06-21 PROCEDURE — 36415 COLL VENOUS BLD VENIPUNCTURE: CPT

## 2017-06-21 PROCEDURE — 85025 COMPLETE CBC W/AUTO DIFF WBC: CPT

## 2017-06-21 PROCEDURE — 77030012965 HC NDL HUBR BBMI -A

## 2017-06-21 PROCEDURE — 80053 COMPREHEN METABOLIC PANEL: CPT

## 2017-06-21 PROCEDURE — 74011000250 HC RX REV CODE- 250

## 2017-06-21 PROCEDURE — 36591 DRAW BLOOD OFF VENOUS DEVICE: CPT

## 2017-06-21 RX ORDER — HEPARIN 100 UNIT/ML
500 SYRINGE INTRAVENOUS AS NEEDED
Status: ACTIVE | OUTPATIENT
Start: 2017-06-21 | End: 2017-06-22

## 2017-06-21 RX ORDER — SODIUM CHLORIDE 9 MG/ML
10 INJECTION INTRAMUSCULAR; INTRAVENOUS; SUBCUTANEOUS AS NEEDED
Status: ACTIVE | OUTPATIENT
Start: 2017-06-21 | End: 2017-06-22

## 2017-06-21 RX ORDER — SODIUM CHLORIDE 0.9 % (FLUSH) 0.9 %
10-40 SYRINGE (ML) INJECTION AS NEEDED
Status: ACTIVE | OUTPATIENT
Start: 2017-06-21 | End: 2017-06-22

## 2017-06-21 RX ADMIN — SODIUM CHLORIDE, PRESERVATIVE FREE 500 UNITS: 5 INJECTION INTRAVENOUS at 14:27

## 2017-06-21 RX ADMIN — SODIUM CHLORIDE 10 ML: 9 INJECTION, SOLUTION INTRAMUSCULAR; INTRAVENOUS; SUBCUTANEOUS at 14:26

## 2017-06-21 RX ADMIN — Medication 10 ML: at 14:27

## 2017-06-21 NOTE — PROGRESS NOTES
Outpatient Infusion Center Short Visit Progress Note    4517 Pt admit to [facility] for port flush and labs ambulatory in stable condition. Assessment completed. No new concerns voiced. Please review pending lab results in [address] [place] [address]. Patient Vitals for the past 12 hrs:   Temp Pulse Resp BP   06/21/17 1420 96.9 °F (36.1 °C) 73 16 143/85       Port accessed with positive blood return. Lab drawn, flushed, heparinized and de-accessed per protocol. Medications:  NS flushes  Heparin    1435 Pt tolerated treatment well. D/c home ambulatory in no distress. Pt aware of next appointment scheduled for 7/18/17.

## 2017-06-21 NOTE — TELEPHONE ENCOUNTER
Pt and wife walk in to office post port flush today. He is question continuing need for port flush. Education provided. Suggested he consider port flushes at [facility]. He is due for follow up with [facility] in July. Appointment made and will also receive port flush at [facility] to determine next steps.

## 2017-06-22 ENCOUNTER — ANESTHESIA EVENT (OUTPATIENT)
Dept: ENDOSCOPY | Age: 82
End: 2017-06-22
Payer: MEDICARE

## 2017-06-22 ENCOUNTER — APPOINTMENT (OUTPATIENT)
Dept: ULTRASOUND IMAGING | Age: 82
End: 2017-06-22
Attending: INTERNAL MEDICINE
Payer: MEDICARE

## 2017-06-22 ENCOUNTER — HOSPITAL ENCOUNTER (OUTPATIENT)
Age: 82
Setting detail: OUTPATIENT SURGERY
Discharge: HOME | End: 2017-06-22
Attending: INTERNAL MEDICINE | Admitting: INTERNAL MEDICINE
Payer: MEDICARE

## 2017-06-22 ENCOUNTER — ANESTHESIA (OUTPATIENT)
Dept: ENDOSCOPY | Age: 82
End: 2017-06-22
Payer: MEDICARE

## 2017-06-22 VITALS
SYSTOLIC BLOOD PRESSURE: 129 MMHG | HEART RATE: 68 BPM | DIASTOLIC BLOOD PRESSURE: 80 MMHG | TEMPERATURE: 97 F | OXYGEN SATURATION: 97 % | RESPIRATION RATE: 26 BRPM

## 2017-06-22 LAB
AMYLASE FLD-CCNC: 3 U/L
CEA FLD-MCNC: <0.5 NG/ML
SPECIMEN SOURCE FLD: NORMAL
SPECIMEN SOURCE FLD: NORMAL

## 2017-06-22 PROCEDURE — 74011250636 HC RX REV CODE- 250/636

## 2017-06-22 PROCEDURE — 76040000019: Performed by: INTERNAL MEDICINE

## 2017-06-22 PROCEDURE — 76060000031 HC ANESTHESIA FIRST 0.5 HR: Performed by: INTERNAL MEDICINE

## 2017-06-22 PROCEDURE — 88173 CYTOPATH EVAL FNA REPORT: CPT | Performed by: INTERNAL MEDICINE

## 2017-06-22 PROCEDURE — 82150 ASSAY OF AMYLASE: CPT | Performed by: INTERNAL MEDICINE

## 2017-06-22 PROCEDURE — 77030009426 HC FCPS BIOP ENDOSC BSC -B: Performed by: INTERNAL MEDICINE

## 2017-06-22 PROCEDURE — 82378 CARCINOEMBRYONIC ANTIGEN: CPT | Performed by: INTERNAL MEDICINE

## 2017-06-22 PROCEDURE — 77030036673 HC NDL BIOP ENDOSC SHRKCOR PRELD COVD -E: Performed by: INTERNAL MEDICINE

## 2017-06-22 PROCEDURE — 88305 TISSUE EXAM BY PATHOLOGIST: CPT | Performed by: INTERNAL MEDICINE

## 2017-06-22 RX ORDER — SODIUM CHLORIDE 9 MG/ML
100 INJECTION, SOLUTION INTRAVENOUS CONTINUOUS
Status: DISCONTINUED | OUTPATIENT
Start: 2017-06-22 | End: 2017-06-22 | Stop reason: HOSPADM

## 2017-06-22 RX ORDER — LEVOFLOXACIN 500 MG/1
500 TABLET, FILM COATED ORAL DAILY
Qty: 3 TAB | Refills: 0 | Status: SHIPPED | OUTPATIENT
Start: 2017-06-23 | End: 2017-06-26

## 2017-06-22 RX ORDER — FLUMAZENIL 0.1 MG/ML
0.2 INJECTION INTRAVENOUS
Status: DISCONTINUED | OUTPATIENT
Start: 2017-06-22 | End: 2017-06-22 | Stop reason: HOSPADM

## 2017-06-22 RX ORDER — PROPOFOL 10 MG/ML
INJECTION, EMULSION INTRAVENOUS AS NEEDED
Status: DISCONTINUED | OUTPATIENT
Start: 2017-06-22 | End: 2017-06-22 | Stop reason: HOSPADM

## 2017-06-22 RX ORDER — DIPHENHYDRAMINE HYDROCHLORIDE 50 MG/ML
50 INJECTION, SOLUTION INTRAMUSCULAR; INTRAVENOUS ONCE
Status: DISCONTINUED | OUTPATIENT
Start: 2017-06-22 | End: 2017-06-22 | Stop reason: HOSPADM

## 2017-06-22 RX ORDER — SODIUM CHLORIDE 0.9 % (FLUSH) 0.9 %
5-10 SYRINGE (ML) INJECTION EVERY 8 HOURS
Status: DISCONTINUED | OUTPATIENT
Start: 2017-06-22 | End: 2017-06-22 | Stop reason: HOSPADM

## 2017-06-22 RX ORDER — ATROPINE SULFATE 0.1 MG/ML
0.5 INJECTION INTRAVENOUS
Status: DISCONTINUED | OUTPATIENT
Start: 2017-06-22 | End: 2017-06-22 | Stop reason: HOSPADM

## 2017-06-22 RX ORDER — SODIUM CHLORIDE 0.9 % (FLUSH) 0.9 %
5-10 SYRINGE (ML) INJECTION AS NEEDED
Status: DISCONTINUED | OUTPATIENT
Start: 2017-06-22 | End: 2017-06-22 | Stop reason: HOSPADM

## 2017-06-22 RX ORDER — NALOXONE HYDROCHLORIDE 0.4 MG/ML
0.4 INJECTION, SOLUTION INTRAMUSCULAR; INTRAVENOUS; SUBCUTANEOUS
Status: DISCONTINUED | OUTPATIENT
Start: 2017-06-22 | End: 2017-06-22 | Stop reason: HOSPADM

## 2017-06-22 RX ORDER — DEXTROMETHORPHAN/PSEUDOEPHED 2.5-7.5/.8
1.2 DROPS ORAL
Status: DISCONTINUED | OUTPATIENT
Start: 2017-06-22 | End: 2017-06-22 | Stop reason: HOSPADM

## 2017-06-22 RX ORDER — SODIUM CHLORIDE 9 MG/ML
INJECTION, SOLUTION INTRAVENOUS
Status: DISCONTINUED | OUTPATIENT
Start: 2017-06-22 | End: 2017-06-22 | Stop reason: HOSPADM

## 2017-06-22 RX ORDER — MIDAZOLAM HYDROCHLORIDE 1 MG/ML
.25-1 INJECTION, SOLUTION INTRAMUSCULAR; INTRAVENOUS
Status: DISCONTINUED | OUTPATIENT
Start: 2017-06-22 | End: 2017-06-22 | Stop reason: HOSPADM

## 2017-06-22 RX ORDER — FENTANYL CITRATE 50 UG/ML
100 INJECTION, SOLUTION INTRAMUSCULAR; INTRAVENOUS
Status: DISCONTINUED | OUTPATIENT
Start: 2017-06-22 | End: 2017-06-22 | Stop reason: HOSPADM

## 2017-06-22 RX ORDER — EPINEPHRINE 0.1 MG/ML
1 INJECTION INTRACARDIAC; INTRAVENOUS
Status: DISCONTINUED | OUTPATIENT
Start: 2017-06-22 | End: 2017-06-22 | Stop reason: HOSPADM

## 2017-06-22 RX ORDER — LEVOFLOXACIN 5 MG/ML
500 INJECTION, SOLUTION INTRAVENOUS ONCE
Status: CANCELLED | OUTPATIENT
Start: 2017-06-22 | End: 2017-06-22

## 2017-06-22 RX ADMIN — PROPOFOL 20 MG: 10 INJECTION, EMULSION INTRAVENOUS at 11:07

## 2017-06-22 RX ADMIN — PROPOFOL 20 MG: 10 INJECTION, EMULSION INTRAVENOUS at 10:50

## 2017-06-22 RX ADMIN — PROPOFOL 20 MG: 10 INJECTION, EMULSION INTRAVENOUS at 11:09

## 2017-06-22 RX ADMIN — PROPOFOL 20 MG: 10 INJECTION, EMULSION INTRAVENOUS at 11:05

## 2017-06-22 RX ADMIN — SODIUM CHLORIDE: 9 INJECTION, SOLUTION INTRAVENOUS at 10:38

## 2017-06-22 RX ADMIN — PROPOFOL 30 MG: 10 INJECTION, EMULSION INTRAVENOUS at 10:51

## 2017-06-22 RX ADMIN — PROPOFOL 20 MG: 10 INJECTION, EMULSION INTRAVENOUS at 10:57

## 2017-06-22 RX ADMIN — PROPOFOL 20 MG: 10 INJECTION, EMULSION INTRAVENOUS at 10:55

## 2017-06-22 RX ADMIN — PROPOFOL 20 MG: 10 INJECTION, EMULSION INTRAVENOUS at 11:01

## 2017-06-22 RX ADMIN — PROPOFOL 20 MG: 10 INJECTION, EMULSION INTRAVENOUS at 11:03

## 2017-06-22 RX ADMIN — PROPOFOL 20 MG: 10 INJECTION, EMULSION INTRAVENOUS at 10:59

## 2017-06-22 RX ADMIN — PROPOFOL 30 MG: 10 INJECTION, EMULSION INTRAVENOUS at 10:53

## 2017-06-22 RX ADMIN — PROPOFOL 20 MG: 10 INJECTION, EMULSION INTRAVENOUS at 10:54

## 2017-06-22 RX ADMIN — PROPOFOL 50 MG: 10 INJECTION, EMULSION INTRAVENOUS at 10:52

## 2017-06-22 RX ADMIN — PROPOFOL 50 MG: 10 INJECTION, EMULSION INTRAVENOUS at 10:49

## 2017-06-22 NOTE — PROGRESS NOTES

## 2017-06-22 NOTE — ROUTINE PROCESS
Kapil [facility]  1935  [id number]    Situation:  Verbal report received from: SARAI Bull  Procedure: Procedure(s):  LINEAR EUS   ESOPHAGOGASTRODUODENAL (EGD) BIOPSY  ESOPHAGOGASTRODUODENOSCOPY (EGD)  FINE NEEDLE ASPIRATION    Background:    Preoperative diagnosis: PANCREATIC CYST  Postoperative diagnosis: 1. Hiatal Hernia  2. Single Pancreatic Cyst    :  Dr. Genie Nino  Assistant(s): Endoscopy Technician-1: Abel Reinoso IV  Endoscopy RN-1: Deric Carlos RN    Specimens:   ID Type Source Tests Collected by Time Destination   1 : GE Junction Biopsy Preservative   Da Nino MD 6/22/2017 1101 Pathology     H. Pylori  no    Assessment:  Intra-procedure medications       Anesthesia gave intra-procedure sedation and medications, see anesthesia flow sheet yes    Intravenous fluids:  500 NS @ KVO     Vital signs stable yes    Abdominal assessment: round and soft yes    Recommendation:  Discharge patient per MD order yes.   Return to floor no  Family or Friend : wife  Permission to share finding with family or friend yes

## 2017-06-22 NOTE — PROCEDURES
[address]  [address]         Endoscopic Ultrasound    NAME:  Kapil Buckley III   :   1935   MRN:   [id number]       Procedure Type: Endoscopic Ultrasound with fine needle aspiration    Indications: pancreas cyst enlarging, concerning for [facility] radiologically    Pre-operative Diagnosis: see indication above    Post-operative Diagnosis:  See findings below    : Marcos Julian. Genie Nino MD    Referring Provider: Sera Carcamo MD, -January Joy    Anethesia/Sedation:  MAC anesthesia Propofol      Procedure Details     After informed consent was obtained for the procedure, with all risks and benefits of procedure explained the patient was taken to the endoscopy suite and placed in the left lateral decubitus position. Following sequential administration of sedation as per above, an EGD was performed. Findings are listed below. Next, the linear echoendoscope was inserted into the mouth and advanced under direct vision to the second portion of the duodenum. Findings:     Endoscopic:   Esophagus:  Cold biopsies of the GE junction taken to rule out Joaquin's esophagus. Short segment of Joaquin's appearing mucosa was seen on NBI. No nodularity was appreciated. Stomach: large 5-6 cm hiatal hernia. otherwise normal   Duodenum: normal appearing ampulla on tangential view      Ultrasound:   Esophagus: normal   Stomach: normal   Pancreas:     Areas examined: the entire gland    Parenchyma: A 2.0 by 2.2 cm anechoic cystic lesion was seen in the body/tail region. There was a 2-3 mm mural nodule. The cyst did not communicate with the main pancreatic duct, which was otherwise normal appearing and non-dilated. The remainder of the pancreas was normal appearing. Color doppler was used to rule out overlying vessels, and using a 22 g  Cyphoma needle a fine needle aspiration X 1 pass was performed. Effort was made to sample the mural nodule.  Approximately 2 cc of non-viscous, clear fluid was obtained. The sample was submitted for fluid analysis (CEA, cytology, and amylase). Pancreatic Duct: as above   Liver:     Parenchyma: normal   Gallbladder: normal   Bile Duct: normal, no stones, sludge, or wall thickening   Lymph Node: no pathological lymph nodes seen         Specimen Removed: 1. Pancreatic body/tail cyst fluid aspiration    Complications: None. EBL:  None. Interventions: see above    Impression:  1. Pancreatic body/tail cyst as described above - now status post fine needle aspiration  2. Joaquin's appearing mucosa - biopsied  3. Large hiatal hernia    Recommendations:   1. Levaquin 500 mg IV in endoscopy recovery  2. Follow up pancreatic cyst fluid analysis  3. Follow up surgical pathology  4. Levaquin prescription provided. Patient advised to start this tomorrow. Signed By: Claudetta Conine.  Amado Martinez MD     6/22/2017  11:19 AM

## 2017-06-22 NOTE — ANESTHESIA POSTPROCEDURE EVALUATION
Post-Anesthesia Evaluation and Assessment    Patient: Mary Salvador MRN: [id number]  SSN: [id number]    YOB: 1935  Age: 80 y.o. Sex: male       Cardiovascular Function/Vital Signs  Visit Vitals    /83    Pulse 75    Temp 36.3 °C (97.4 °F)    Resp 16    SpO2 95%       Patient is status post MAC anesthesia for Procedure(s):  LINEAR EUS   ESOPHAGOGASTRODUODENAL (EGD) BIOPSY  ESOPHAGOGASTRODUODENOSCOPY (EGD)  FINE NEEDLE ASPIRATION. Nausea/Vomiting: None    Postoperative hydration reviewed and adequate. Pain:  Pain Scale 1: Numeric (0 - 10) (06/22/17 0925)  Pain Intensity 1: 1 (06/22/17 0925)   Managed    Neurological Status: At baseline    Mental Status and Level of Consciousness: Arousable    Pulmonary Status:   O2 Device: CO2 nasal cannula (06/22/17 1112)   Adequate oxygenation and airway patent    Complications related to anesthesia: None    Post-anesthesia assessment completed.  No concerns    Signed By: Kelvin Blackburn MD     June 22, 2017

## 2017-06-22 NOTE — IP AVS SNAPSHOT
2700 [facility] [address] [place] [address] 
[phone] Patient: Susanne Carter 
MRN: [id number] FSL:[id number] You are allergic to the following Allergen Reactions Talwin (Pentazocine Lactate) Unknown (comments) Cant remember, happened 40 years ago Recent Documentation Smoking Status Former Smoker Emergency Contacts Name Discharge Info Relation Home Work Mobile Ysitie 6 CAREGIVER [3] Spouse [3] [phone] [id number] Charleen Billings CAREGIVER [4] Daughter [21] [phone] About your hospitalization You were admitted on:  June 22, 2017 You last received care in the:  [facility] ENDOSCOPY You were discharged on:  June 22, 2017 Unit phone number:  [phone] Why you were hospitalized Your primary diagnosis was:  Not on File Providers Seen During Your Hospitalizations Provider Role Specialty Primary office phone Da Reyes MD Attending Provider Gastroenterology [phone] Your Primary Care Physician (PCP) Primary Care Physician Office Phone Office Fax Antione Buck [phone] Follow-up Information None Your Appointments Tuesday July 18, 2017  9:00 AM EDT INFUSION 30 with BREMO FAST TRACK NURSE  
455 [facility] ([address]) [address] [place] [address] [facility] [id number] [phone] Go to [address] [facility] [address], ground floor. Tuesday July 18, 2017 11:30 AM EDT Follow Up with Trena Nance MD  
[facility] Oncology at [facility] 217 [facility] 209 [address] [place] [address]  
[phone] Current Discharge Medication List  
  
START taking these medications Dose & Instructions Dispensing Information Comments Morning Noon Evening Bedtime  
 levoFLOXacin 500 mg tablet Commonly known as:  Sheila Upton Start taking on:  6/23/2017 Your last dose was: Your next dose is:    
   
   
 Dose:  500 mg Take 1 Tab by mouth daily for 3 days. Quantity:  3 Tab Refills:  0 CONTINUE these medications which have NOT CHANGED Dose & Instructions Dispensing Information Comments Morning Noon Evening Bedtime ADVIL 200 mg tablet Generic drug:  ibuprofen Your last dose was: Your next dose is:    
   
   
 Dose:  200 mg  
200 mg. Refills:  0  
     
   
   
   
  
 aluminum-magnesium hydroxide 200-200 mg/5 mL susp 30 mL, diphenhydrAMINE 12.5 mg/5 mL elix 75 mg, lidocaine 2 % soln 30 mL oral suspension (compounded) Your last dose was: Your next dose is:    
   
   
 Dose:  5 mL Take 5 mL by mouth Before breakfast, lunch, dinner and at bedtime. Quantity:  1 Bottle Refills:  2  
     
   
   
   
  
 artificial saliva Spra Commonly known as:  Kathy Montemayor Your last dose was: Your next dose is:    
   
   
 Dose:  1 Spray Take 1 Spray by mouth as needed for Other. Quantity:  240 mL Refills:  1 diphenhydrAMINE-acetaminophen  mg Tab Your last dose was: Your next dose is:    
   
   
 TYLENOL  PM TABS (ACETAMINOPHEN TABS) Refills:  0  
     
   
   
   
  
 polyethylene glycol 17 gram packet Commonly known as:  Donna Zacarias Your last dose was: Your next dose is:    
   
   
 Dose:  17 g Take 1 Packet by mouth daily. Quantity:  30 Packet Refills:  2  
     
   
   
   
  
 zolpidem 10 mg tablet Commonly known as:  AMBIEN Your last dose was: Your next dose is:    
   
   
 Dose:  10 mg Take 10 mg by mouth nightly as needed for Sleep. Refills:  0 Where to Get Your Medications Information on where to get these meds will be given to you by the nurse or doctor. ! Ask your nurse or doctor about these medications  
  levoFLOXacin 500 mg tablet Discharge Instructions [address] 
[address] [place] [address], [address] [place] [address] Endoscopic ultrasound DISCHARGE INSTRUCTIONS Carlyle Nagel III 
[id number] 
1935 Discomfort: 
Sore throat- throat lozenges or warm salt water gargle 
redness at IV site- apply warm compress to area; if redness or soreness persist- contact your physician Gaseous discomfort- walking, belching will help relieve any discomfort You may not operate a vehicle for 12 hours You may not engage in an occupation involving machinery or appliances for rest of today You may not drink alcoholic beverages for at least 12 hours Avoid making any critical decisions for at least 24 hour DIET You may eat and drink now and after you leave. You may resume your regular diet  however -  remember your colon is empty and a heavy meal will produce gas. Avoid these foods:  vegetables, fried / greasy foods, carbonated drinks ACTIVITY You may resume your normal daily activities Spend the remainder of the day resting -  avoid any strenuous activity. CALL M.D. ANY SIGN OF Increasing pain, nausea, vomiting Abdominal distension (swelling) New increased bleeding (oral or rectal) Fever (chills) Pain in chest area Bloody discharge from nose or mouth Shortness of breath Follow-up Instructions: 
 Call Dr. Sarah Balderas for any questions or problems. Telephone # [phone] ENDOSCOPY FINDINGS: 
 Your endoscopic ultrasound showed a cyst in the pancreas. Fine needle aspiration was performed. We will contact you about the results. A prescription for antibiotics has been provided. Please start this medication tomorrow (Friday 6/23/17). Signed By: Myrtle Arriaza.  Flaco Collazo MD   
 6/22/2017  11:17 AM 
  
 
 
 
 
 Discharge Orders None Introducing \[facility] [place] [facility]\"" & HEALTH SERVICES! [place] introduces Touch[employer]ent patient portal. Now you can access parts of your medical record, email your doctor's office, and request medication refills online. 1. In your internet browser, go to [email or web address] 2. Click on the First Time User? Click Here link in the Sign In box. You will see the New Member Sign Up page. 3. Enter your Touchtalent Access Code exactly as it appears below. You will not need to use this code after youve completed the sign-up process. If you do not sign up before the expiration date, you must request a new code. · Touchtalent Access Code: [email or web address] Expires: 8/6/2017  2:12 PM 
 
4. Enter the last four digits of your Social Security Number (xxxx) and Date of Birth (mm/dd/yyyy) as indicated and click Submit. You will be taken to the next sign-up page. 5. Create a Touchtalent ID. This will be your Touchtalent login ID and cannot be changed, so think of one that is secure and easy to remember. 6. Create a Touchtalent password. You can change your password at any time. 7. Enter your Password Reset Question and Answer. This can be used at a later time if you forget your password. 8. Enter your e-mail address. You will receive e-mail notification when new information is available in [address] 9. Click Sign Up. You can now view and download portions of your medical record. 10. Click the Download Summary menu link to download a portable copy of your medical information. If you have questions, please visit the Frequently Asked Questions section of the Touchtalent website. Remember, Touchtalent is NOT to be used for urgent needs. For medical emergencies, dial 911. Now available from your iPhone and Android! General Information Please provide this summary of care documentation to your next provider.  
  
  
    
    
 Patient Signature: ____________________________________________________________ Date:  ____________________________________________________________  
  
[facility] Provider Signature:  ____________________________________________________________ Date:  ____________________________________________________________

## 2017-06-22 NOTE — H&P
1500 [facility] [address], 387 [facility]      History and Physical       NAME:  Leilani Gordon III   :   1935   MRN:   [id number]             History of Present Illness:  Patient is a 80 y.o. who is seen for tail of pancreas cystic lesion. PMH:  Past Medical History:   Diagnosis Date    GERD (gastroesophageal reflux disease)     Raynaud disease     Tonsil cancer ([facility] [address]        PSH:  Past Surgical History:   Procedure Laterality Date    HX GI      colonscopy    HX HEENT      HX OTHER SURGICAL      Bilateral Inguinal Hernias    HX TONSILLECTOMY      2016    HX UROLOGICAL      vasectomy    HX VASCULAR ACCESS         Allergies: Allergies   Allergen Reactions    Talwin [Pentazocine Lactate] Unknown (comments)     Cant remember, happened 40 years ago       Home Medications:  Prior to Admission Medications   Prescriptions Last Dose Informant Patient Reported? Taking?   aluminum-magnesium hydroxide 200-200 mg/5 mL susp 30 mL, diphenhydrAMINE 12.5 mg/5 mL elix 75 mg, lidocaine 2 % soln 30 mL oral suspension (compounded)   No No   Sig: Take 5 mL by mouth Before breakfast, lunch, dinner and at bedtime. artificial saliva (MOUTH KOTE) spra 2017 at Unknown time  No Yes   Sig: Take 1 Spray by mouth as needed for Other. diphenhydrAMINE-acetaminophen  mg tab 2017  Yes No   Sig: TYLENOL  PM TABS (ACETAMINOPHEN TABS)   ibuprofen (ADVIL) 200 mg tablet 6/15/2017 at Unknown time  Yes Yes   Si mg.   polyethylene glycol (MIRALAX) 17 gram packet Unknown at Unknown time  No No   Sig: Take 1 Packet by mouth daily. zolpidem (AMBIEN) 10 mg tablet Unknown at Unknown time  Yes No   Sig: Take 10 mg by mouth nightly as needed for Sleep.       Facility-Administered Medications: None       Hospital Medications:  Current Facility-Administered Medications   Medication Dose Route Frequency    midazolam (VERSED) injection 0.25-10 mg  0.25-10 mg IntraVENous Multiple  fentaNYL citrate (PF) injection 100 mcg  100 mcg IntraVENous Multiple    naloxone (NARCAN) injection 0.4 mg  0.4 mg IntraVENous Multiple    flumazenil (ROMAZICON) 0.1 mg/mL injection 0.2 mg  0.2 mg IntraVENous Multiple    simethicone (MYLICON) 81NQ/7.2LZ oral drops 80 mg  1.2 mL Oral Multiple    diphenhydrAMINE (BENADRYL) injection 50 mg  50 mg IntraVENous ONCE    atropine injection 0.5 mg  0.5 mg IntraVENous ONCE PRN    EPINEPHrine (ADRENALIN) 0.1 mg/mL syringe 1 mg  1 mg Endoscopically ONCE PRN     Facility-Administered Medications Ordered in Other Encounters   Medication Dose Route Frequency    sodium chloride 0.9 % injection 10 mL  10 mL IntraVENous PRN    heparin (porcine) pf 500 Units  500 Units IntraVENous PRN    sodium chloride (NS) flush 10-40 mL  10-40 mL IntraVENous PRN       Social History:  Social History   Substance Use Topics    Smoking status: Former Smoker    Smokeless tobacco: Never Used    Alcohol use Yes       Family History:  Family History   Problem Relation Age of Onset    Dementia Mother     Parkinson's Disease Father              Review of Systems:      Constitutional: negative fever, negative chills, negative weight loss  Eyes:   negative visual changes  ENT:   negative sore throat, tongue or lip swelling  Respiratory:  negative cough, negative dyspnea  Cards:  negative for chest pain, palpitations, lower extremity edema  GI:   See HPI  :  negative for frequency, dysuria  Integument:  negative for rash and pruritus  Heme:  negative for easy bruising and gum/nose bleeding  Musculoskel: negative for myalgias,  back pain and muscle weakness  Neuro: negative for headaches, dizziness, vertigo  Psych:  negative for feelings of anxiety, depression       Objective:   Patient Vitals for the past 8 hrs:   BP Temp Pulse Resp SpO2   06/22/17 0925 136/75 97.4 °F (36.3 °C) 62 18 100 %             EXAM:     NEURO-a&o   HEENT-wnl   LUNGS-clear    COR-regular rate and rhythym ABD-soft , no tenderness, no rebound, good bs     EXT-no edema     Data Review     Recent Labs      06/21/17   1426   WBC  5.8   HGB  12.4   HCT  35.8*   PLT  257     Recent Labs      06/21/17   1426   NA  138   K  4.0   CL  103   CO2  28   BUN  20   CREA  0.94   GLU  84   CA  8.8     Recent Labs      06/21/17   1426   SGOT  16   AP  76   TP  7.0   ALB  3.7   GLOB  3.3     No results for input(s): INR, PTP, APTT in the last 72 hours. No lab exists for component: INREXT       Assessment:   · Tail of pancreas cyst     Patient Active Problem List   Diagnosis Code    Tonsil cancer ([facility] [address] C09.9    Dehydration E86.0    Malnutrition ([facility] [address] E46    Esophagitis K20.9    Anemia D64.9     Plan:   · Endoscopic procedure with MAC     Signed By: Thor Rogel.  Radha Mackenzie MD     6/22/2017  10:37 AM

## 2017-06-22 NOTE — DISCHARGE INSTRUCTIONS
1500 [place] [facility] 12, 529 [facility]    Endoscopic ultrasound DISCHARGE INSTRUCTIONS    Garner Galeazzi  [id number]  1935    Discomfort:  Sore throat- throat lozenges or warm salt water gargle  redness at IV site- apply warm compress to area; if redness or soreness persist- contact your physician  Gaseous discomfort- walking, belching will help relieve any discomfort  You may not operate a vehicle for 12 hours  You may not engage in an occupation involving machinery or appliances for rest of today  You may not drink alcoholic beverages for at least 12 hours  Avoid making any critical decisions for at least 24 hour  DIET  You may eat and drink now and after you leave. You may resume your regular diet - however -  remember your colon is empty and a heavy meal will produce gas. Avoid these foods:  vegetables, fried / greasy foods, carbonated drinks    ACTIVITY  You may resume your normal daily activities   Spend the remainder of the day resting -  avoid any strenuous activity. CALL M.D. ANY SIGN OF   Increasing pain, nausea, vomiting  Abdominal distension (swelling)  New increased bleeding (oral or rectal)  Fever (chills)  Pain in chest area  Bloody discharge from nose or mouth  Shortness of breath    Follow-up Instructions:   Call Dr. Dani Tijerina for any questions or problems. Telephone # [phone]    ENDOSCOPY FINDINGS:   Your endoscopic ultrasound showed a cyst in the pancreas. Fine needle aspiration was performed. We will contact you about the results. A prescription for antibiotics has been provided. Please start this medication tomorrow (Friday 6/23/17). Signed By: Tahir Vasquez MD     6/22/2017  11:17 AM

## 2017-06-22 NOTE — ANESTHESIA PREPROCEDURE EVALUATION
Anesthetic History   No history of anesthetic complications            Review of Systems / Medical History  Patient summary reviewed, nursing notes reviewed and pertinent labs reviewed    Pulmonary  Within defined limits                 Neuro/Psych   Within defined limits           Cardiovascular                       GI/Hepatic/Renal     GERD          Comments: Pancreatic mass Endo/Other        Cancer    Comments: H/o tonsil cancer/ chemo and radiation Other Findings            Physical Exam    Airway  Mallampati: II  TM Distance: > 6 cm  Neck ROM: normal range of motion   Mouth opening: Normal     Cardiovascular    Rhythm: regular  Rate: normal         Dental    Dentition: Upper partial plate     Pulmonary  Breath sounds clear to auscultation               Abdominal  Abdominal exam normal       Other Findings            Anesthetic Plan    ASA: 3  Anesthesia type: MAC          Induction: Intravenous  Anesthetic plan and risks discussed with: Patient

## 2017-07-17 RX ORDER — HEPARIN 100 UNIT/ML
500 SYRINGE INTRAVENOUS AS NEEDED
Status: DISCONTINUED | OUTPATIENT
Start: 2017-07-17 | End: 2017-07-17

## 2017-07-17 RX ORDER — SODIUM CHLORIDE 9 MG/ML
10 INJECTION INTRAMUSCULAR; INTRAVENOUS; SUBCUTANEOUS AS NEEDED
Status: DISCONTINUED | OUTPATIENT
Start: 2017-07-17 | End: 2017-07-17

## 2017-07-17 RX ORDER — SODIUM CHLORIDE 0.9 % (FLUSH) 0.9 %
5-10 SYRINGE (ML) INJECTION AS NEEDED
Status: DISCONTINUED | OUTPATIENT
Start: 2017-07-17 | End: 2017-07-17

## 2017-07-18 ENCOUNTER — OFFICE VISIT (OUTPATIENT)
Dept: ONCOLOGY | Age: 82
End: 2017-07-18

## 2017-07-18 ENCOUNTER — HOSPITAL ENCOUNTER (OUTPATIENT)
Dept: INFUSION THERAPY | Age: 82
Discharge: HOME | End: 2017-07-18
Payer: MEDICARE

## 2017-07-18 VITALS
DIASTOLIC BLOOD PRESSURE: 81 MMHG | HEART RATE: 78 BPM | TEMPERATURE: 97 F | OXYGEN SATURATION: 100 % | RESPIRATION RATE: 16 BRPM | SYSTOLIC BLOOD PRESSURE: 141 MMHG

## 2017-07-18 VITALS
OXYGEN SATURATION: 97 % | SYSTOLIC BLOOD PRESSURE: 138 MMHG | WEIGHT: 154 LBS | HEIGHT: 69 IN | TEMPERATURE: 98.4 F | HEART RATE: 71 BPM | DIASTOLIC BLOOD PRESSURE: 76 MMHG | BODY MASS INDEX: 22.81 KG/M2

## 2017-07-18 DIAGNOSIS — C09.9 TONSIL CANCER (HCC): Primary | ICD-10-CM

## 2017-07-18 LAB
ALBUMIN SERPL BCP-MCNC: 3.7 G/DL (ref 3.5–5)
ALBUMIN/GLOB SERPL: 1.2 {RATIO} (ref 1.1–2.2)
ALP SERPL-CCNC: 71 U/L (ref 45–117)
ALT SERPL-CCNC: 12 U/L (ref 12–78)
ANION GAP BLD CALC-SCNC: 6 MMOL/L (ref 5–15)
AST SERPL W P-5'-P-CCNC: 15 U/L (ref 15–37)
BASOPHILS # BLD AUTO: 0 K/UL (ref 0–0.1)
BASOPHILS # BLD: 0 % (ref 0–1)
BILIRUB SERPL-MCNC: 0.3 MG/DL (ref 0.2–1)
BUN SERPL-MCNC: 26 MG/DL (ref 6–20)
BUN/CREAT SERPL: 26 (ref 12–20)
CALCIUM SERPL-MCNC: 8.8 MG/DL (ref 8.5–10.1)
CHLORIDE SERPL-SCNC: 102 MMOL/L (ref 97–108)
CO2 SERPL-SCNC: 28 MMOL/L (ref 21–32)
CREAT SERPL-MCNC: 1.01 MG/DL (ref 0.7–1.3)
EOSINOPHIL # BLD: 0.1 K/UL (ref 0–0.4)
EOSINOPHIL NFR BLD: 2 % (ref 0–7)
ERYTHROCYTE [DISTWIDTH] IN BLOOD BY AUTOMATED COUNT: 13.8 % (ref 11.5–14.5)
GLOBULIN SER CALC-MCNC: 3.1 G/DL (ref 2–4)
GLUCOSE SERPL-MCNC: 98 MG/DL (ref 65–100)
HCT VFR BLD AUTO: 39 % (ref 36.6–50.3)
HGB BLD-MCNC: 13.2 G/DL (ref 12.1–17)
LYMPHOCYTES # BLD AUTO: 18 % (ref 12–49)
LYMPHOCYTES # BLD: 1 K/UL (ref 0.8–3.5)
MCH RBC QN AUTO: 32 PG (ref 26–34)
MCHC RBC AUTO-ENTMCNC: 33.8 G/DL (ref 30–36.5)
MCV RBC AUTO: 94.4 FL (ref 80–99)
MONOCYTES # BLD: 0.4 K/UL (ref 0–1)
MONOCYTES NFR BLD AUTO: 7 % (ref 5–13)
NEUTS SEG # BLD: 3.9 K/UL (ref 1.8–8)
NEUTS SEG NFR BLD AUTO: 73 % (ref 32–75)
PLATELET # BLD AUTO: 225 K/UL (ref 150–400)
POTASSIUM SERPL-SCNC: 4.4 MMOL/L (ref 3.5–5.1)
PROT SERPL-MCNC: 6.8 G/DL (ref 6.4–8.2)
RBC # BLD AUTO: 4.13 M/UL (ref 4.1–5.7)
SODIUM SERPL-SCNC: 136 MMOL/L (ref 136–145)
WBC # BLD AUTO: 5.4 K/UL (ref 4.1–11.1)

## 2017-07-18 PROCEDURE — 85025 COMPLETE CBC W/AUTO DIFF WBC: CPT

## 2017-07-18 PROCEDURE — 74011250636 HC RX REV CODE- 250/636

## 2017-07-18 PROCEDURE — 74011000250 HC RX REV CODE- 250

## 2017-07-18 PROCEDURE — 36591 DRAW BLOOD OFF VENOUS DEVICE: CPT

## 2017-07-18 PROCEDURE — 80053 COMPREHEN METABOLIC PANEL: CPT

## 2017-07-18 PROCEDURE — 36415 COLL VENOUS BLD VENIPUNCTURE: CPT

## 2017-07-18 PROCEDURE — 77030012965 HC NDL HUBR BBMI -A

## 2017-07-18 RX ORDER — ZOLPIDEM TARTRATE 10 MG/1
10 TABLET ORAL
COMMUNITY
Start: 2017-07-17 | End: 2017-07-18 | Stop reason: SDUPTHER

## 2017-07-18 RX ORDER — ZOLPIDEM TARTRATE 10 MG/1
10 TABLET ORAL
COMMUNITY
End: 2017-07-18 | Stop reason: SDUPTHER

## 2017-07-18 RX ORDER — SODIUM CHLORIDE 9 MG/ML
10 INJECTION INTRAMUSCULAR; INTRAVENOUS; SUBCUTANEOUS AS NEEDED
Status: ACTIVE | OUTPATIENT
Start: 2017-07-18 | End: 2017-07-19

## 2017-07-18 RX ORDER — MECLIZINE HYDROCHLORIDE 25 MG/1
25 TABLET ORAL
COMMUNITY
Start: 2017-07-17 | End: 2017-07-18 | Stop reason: SDUPTHER

## 2017-07-18 RX ORDER — SODIUM CHLORIDE 0.9 % (FLUSH) 0.9 %
5-10 SYRINGE (ML) INJECTION AS NEEDED
Status: ACTIVE | OUTPATIENT
Start: 2017-07-18 | End: 2017-07-19

## 2017-07-18 RX ORDER — HEPARIN 100 UNIT/ML
500 SYRINGE INTRAVENOUS AS NEEDED
Status: ACTIVE | OUTPATIENT
Start: 2017-07-18 | End: 2017-07-19

## 2017-07-18 RX ADMIN — SODIUM CHLORIDE, PRESERVATIVE FREE 500 UNITS: 5 INJECTION INTRAVENOUS at 09:16

## 2017-07-18 RX ADMIN — SODIUM CHLORIDE 10 ML: 9 INJECTION INTRAMUSCULAR; INTRAVENOUS; SUBCUTANEOUS at 09:17

## 2017-07-18 RX ADMIN — Medication 10 ML: at 09:16

## 2017-07-18 NOTE — PROGRESS NOTES
Outpatient Infusion Center Short Visit Progress Note    7150 Pt admit to [facility] for [facility] Flush/labs ambulatory in stable condition. Assessment completed. No new concerns voiced. Patient Vitals for the past 12 hrs:   Temp Pulse Resp BP SpO2   07/18/17 0905 97 °F (36.1 °C) 78 16 141/81 100 %       Port accessed and flushed with positive blood return. Lab drawn, flushed, heparinized and de-accessed per protocol. Medications:  Normal Saline Flush  Heparin Flush    0915 Pt tolerated treatment well. D/c home ambulatory in no distress. Pt has no further appointments scheduled at this time. Recent Results (from the past 12 hour(s))   METABOLIC PANEL, COMPREHENSIVE    Collection Time: 07/18/17  9:10 AM   Result Value Ref Range    Sodium 136 136 - 145 mmol/L    Potassium 4.4 3.5 - 5.1 mmol/L    Chloride 102 97 - 108 mmol/L    CO2 28 21 - 32 mmol/L    Anion gap 6 5 - 15 mmol/L    Glucose 98 65 - 100 mg/dL    BUN 26 (H) 6 - 20 MG/DL    Creatinine 1.01 0.70 - 1.30 MG/DL    BUN/Creatinine ratio 26 (H) 12 - 20      GFR est AA >60 >60 ml/min/1.73m2    GFR est non-AA >60 >60 ml/min/1.73m2    Calcium 8.8 8.5 - 10.1 MG/DL    Bilirubin, total 0.3 0.2 - 1.0 MG/DL    ALT (SGPT) 12 12 - 78 U/L    AST (SGOT) 15 15 - 37 U/L    Alk. phosphatase 71 45 - 117 U/L    Protein, total 6.8 6.4 - 8.2 g/dL    Albumin 3.7 3.5 - 5.0 g/dL    Globulin 3.1 2.0 - 4.0 g/dL    A-G Ratio 1.2 1.1 - 2.2     CBC WITH AUTOMATED DIFF    Collection Time: 07/18/17  9:10 AM   Result Value Ref Range    WBC 5.4 4.1 - 11.1 K/uL    RBC 4.13 4.10 - 5.70 M/uL    HGB 13.2 12.1 - 17.0 g/dL    HCT 39.0 36.6 - 50.3 %    MCV 94.4 80.0 - 99.0 FL    MCH 32.0 26.0 - 34.0 PG    MCHC 33.8 30.0 - 36.5 g/dL    RDW 13.8 11.5 - 14.5 %    PLATELET 651 569 - 243 K/uL    NEUTROPHILS 73 32 - 75 %    LYMPHOCYTES 18 12 - 49 %    MONOCYTES 7 5 - 13 %    EOSINOPHILS 2 0 - 7 %    BASOPHILS 0 0 - 1 %    ABS. NEUTROPHILS 3.9 1.8 - 8.0 K/UL    ABS. LYMPHOCYTES 1.0 0.8 - 3.5 K/UL    ABS. MONOCYTES 0.4 0.0 - 1.0 K/UL    ABS. EOSINOPHILS 0.1 0.0 - 0.4 K/UL    ABS.  BASOPHILS 0.0 0.0 - 0.1 K/UL

## 2017-07-18 NOTE — PROGRESS NOTES
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 Lora Nath is a 80 y.o. male who presents for follow-up of tonsil cancer. In summer 2016 he noticed a mass in his left neck. He saw Dr. Constantino Eagle in ENT. After a trial of antibiotics he had an FNA revealing SCC in 7/20/16. Quit smoking in 2016, but was never a heavy smoker. He had treatment as above. Presents today for follow-up. Is weight is going up. Appetite is increasing. He has a \"new normal' with taste. Had MRCP under the care of Dr. Blessing Painting who also did an EUS on 6/22/17. His back has been bothering him. Though, this is chronic. He also had this in 2014 and has had x-rays. Back in with a chiropractor. Otherwise, complete ROS is per the symptom report form which has been scanned into the media section of the electronic medical record. Past Medical History:   Diagnosis Date    GERD (gastroesophageal reflux disease)     Raynaud disease     Tonsil cancer ([facility] [address]        Past Surgical History:   Procedure Laterality Date    HX GI      colonscopy    HX HEENT      HX OTHER SURGICAL      Bilateral Inguinal Hernias    HX TONSILLECTOMY      August 2016    HX UROLOGICAL      vasectomy    HX VASCULAR ACCESS         Allergies   Allergen Reactions    Talwin [Pentazocine Lactate] Unknown (comments)     Cant remember, happened 40 years ago       Current Outpatient Prescriptions   Medication Sig Dispense Refill    meclizine (ANTIVERT) 25 mg tablet Take 1 Tab by mouth three (3) times daily as needed for Dizziness. 20 Tab 0    ibuprofen (ADVIL) 200 mg tablet 200 mg.      artificial saliva (MOUTH KOTE) spra Take 1 Spray by mouth as needed for Other.  240 mL 1    polyethylene glycol (MIRALAX) 17 gram packet Take 1 Packet by mouth daily. 30 Packet 2    zolpidem (AMBIEN) 10 mg tablet Take 10 mg by mouth nightly as needed for Sleep.  diphenhydrAMINE-acetaminophen  mg tab TYLENOL  PM TABS (ACETAMINOPHEN TABS)      aluminum-magnesium hydroxide 200-200 mg/5 mL susp 30 mL, diphenhydrAMINE 12.5 mg/5 mL elix 75 mg, lidocaine 2 % soln 30 mL oral suspension (compounded) Take 5 mL by mouth Before breakfast, lunch, dinner and at bedtime. 1 Bottle 2     Facility-Administered Medications Ordered in Other Visits   Medication Dose Route Frequency Provider Last Rate Last Dose    sodium chloride 0.9 % injection 10 mL  10 mL IntraVENous PRN Corazon Hitchcock MD   10 mL at 07/18/17 0917    heparin (porcine) pf 500 Units  500 Units IntraVENous PRN Corazon Hitchcock MD   500 Units at 07/18/17 0898    sodium chloride (NS) flush 5-10 mL  5-10 mL IntraVENous PRN Corazon Hitchcock MD   10 mL at 07/18/17 8101       Social History     Social History    Marital status:      Spouse name: N/A    Number of children: N/A    Years of education: N/A     Social History Main Topics    Smoking status: Former Smoker    Smokeless tobacco: Never Used    Alcohol use Yes    Drug use: None    Sexual activity: Not Asked     Other Topics Concern    None     Social History Narrative       Family History   Problem Relation Age of Onset    Dementia Mother     Parkinson's Disease Father        ROS  As per the HPI, otherwise a comprehensive ROS is negative.   ECOG PS is 1    Physical Examination:   Visit Vitals    /76    Pulse 71    Temp 98.4 °F (36.9 °C) (Oral)    Ht 5' 9\" (1.753 m)    Wt 154 lb (69.9 kg)    SpO2 97%    BMI 22.74 kg/m2     General appearance - alert, well appearing, and in no distress  Mental status - oriented to person, place, and time  Mouth - mucous membranes moist, no sign of cancer  Neck - supple, no adenopathy, there is some fullness in the submental area  Chest - clear to auscultation, no wheezes, rales or rhonchi, symmetric air entry  Heart - normal rate, regular rhythm, normal S1, S2, no murmurs, rubs, clicks or gallops  Abdomen - soft, nontender, nondistended, bowel sounds present  Neurological - normal speech, no focal findings or movement disorder noted  Musculoskeletal - no joint tenderness, deformity or swelling  Extremities - peripheral pulses normal, no pedal edema, no clubbing or cyanosis  Skin - normal coloration and turgor, no rashes, no suspicious skin lesions noted    LABS  Lab Results   Component Value Date/Time    WBC 5.4 07/18/2017 09:10 AM    HGB 13.2 07/18/2017 09:10 AM    HCT 39.0 07/18/2017 09:10 AM    PLATELET 538 98/82/5940 09:10 AM    MCV 94.4 07/18/2017 09:10 AM     Lab Results   Component Value Date/Time    Sodium 136 07/18/2017 09:10 AM    Potassium 4.4 07/18/2017 09:10 AM    Chloride 102 07/18/2017 09:10 AM    CO2 28 07/18/2017 09:10 AM    Anion gap 6 07/18/2017 09:10 AM    Glucose 98 07/18/2017 09:10 AM    BUN 26 07/18/2017 09:10 AM    Creatinine 1.01 07/18/2017 09:10 AM    BUN/Creatinine ratio 26 07/18/2017 09:10 AM    GFR est AA >60 07/18/2017 09:10 AM    GFR est non-AA >60 07/18/2017 09:10 AM    Calcium 8.8 07/18/2017 09:10 AM    Bilirubin, total 0.3 07/18/2017 09:10 AM    AST (SGOT) 15 07/18/2017 09:10 AM    Alk. 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 Margins are close, but negative. T1.   FNA of left neck mass on 7/20/16 with squamous cell carcinoma. P16 positive. IMAGING  PET on 2/13/17 with no foci of abnormal hypermetabolism. There is a 1.5 cm cystic lesion projected off the body of the pancreas.    PET on 8/2/16 with increased metabolic activity in the enlarged left level 2 lymph node with SUV of 11 suspicious for metastatic disease. There is minimal asymmetric increased activity in the left palatine tonsil compared to the right without an obvious mass. ASSESSMENT  Mr. Emerita Bernstein is a 80 y.o. 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 DISCUSSION/PLAN  1. Tonsil cancer. PET scan from 2/13/17 with no residual disease. Continues to recover from side effects of treatment. Continues to see Dr. Mariana Goldberg. Encouraged follow-up with her. No sign of local recurrence. Only sign of distant recurrence is the back pain, though as below he wishes to hold off on imaging. 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 3. Pancreatic cyst.  Had an MRI and EUS under the care of Dr. Ana Dior. 4. Port. Will remove this. 5. Back pain. Despite the fact that this is chronic, it is also worse in the recent months. I offered him imaging, but he wishes to hold off on this as he has had similar pains for around 3-4 years. I have asked him to contact us if it doesn't start to improve as expected or if he develops neurological deficits. Follow up in 6 months, sooner if needed. Unfortunately, I will not be with the practice at that time. He will follow up with Dr. Darrell Franks.      Homar Conte MD

## 2017-07-27 ENCOUNTER — HOSPITAL ENCOUNTER (OUTPATIENT)
Dept: INTERVENTIONAL RADIOLOGY/VASCULAR | Age: 82
Discharge: HOME | End: 2017-07-27
Payer: MEDICARE

## 2017-07-27 VITALS
TEMPERATURE: 97.8 F | HEART RATE: 59 BPM | WEIGHT: 154 LBS | RESPIRATION RATE: 14 BRPM | HEIGHT: 69 IN | OXYGEN SATURATION: 96 % | BODY MASS INDEX: 22.81 KG/M2 | DIASTOLIC BLOOD PRESSURE: 54 MMHG | SYSTOLIC BLOOD PRESSURE: 147 MMHG

## 2017-07-27 DIAGNOSIS — C09.9 TONSIL CANCER (HCC): ICD-10-CM

## 2017-07-27 PROCEDURE — 77030011893 HC TY CUT DN TRIS -B

## 2017-07-27 PROCEDURE — 36589 REMOVAL TUNNELED CV CATH: CPT

## 2017-07-27 PROCEDURE — 77030010507 HC ADH SKN DERMBND J&J -B

## 2017-07-27 PROCEDURE — 74011000250 HC RX REV CODE- 250: Performed by: RADIOLOGY

## 2017-07-27 PROCEDURE — 77030031139 HC SUT VCRL2 J&J -A

## 2017-07-27 PROCEDURE — 74011250636 HC RX REV CODE- 250/636: Performed by: RADIOLOGY

## 2017-07-27 RX ORDER — SODIUM CHLORIDE 9 MG/ML
25 INJECTION, SOLUTION INTRAVENOUS CONTINUOUS
Status: DISCONTINUED | OUTPATIENT
Start: 2017-07-27 | End: 2017-07-27 | Stop reason: HOSPADM

## 2017-07-27 RX ORDER — MIDAZOLAM HYDROCHLORIDE 1 MG/ML
5 INJECTION, SOLUTION INTRAMUSCULAR; INTRAVENOUS
Status: DISCONTINUED | OUTPATIENT
Start: 2017-07-27 | End: 2017-07-27 | Stop reason: HOSPADM

## 2017-07-27 RX ORDER — LIDOCAINE HYDROCHLORIDE 20 MG/ML
20 INJECTION, SOLUTION INFILTRATION; PERINEURAL
Status: COMPLETED | OUTPATIENT
Start: 2017-07-27 | End: 2017-07-27

## 2017-07-27 RX ORDER — FENTANYL CITRATE 50 UG/ML
100 INJECTION, SOLUTION INTRAMUSCULAR; INTRAVENOUS
Status: DISCONTINUED | OUTPATIENT
Start: 2017-07-27 | End: 2017-07-27 | Stop reason: HOSPADM

## 2017-07-27 RX ADMIN — MIDAZOLAM HYDROCHLORIDE 1 MG: 1 INJECTION, SOLUTION INTRAMUSCULAR; INTRAVENOUS at 09:30

## 2017-07-27 RX ADMIN — SODIUM CHLORIDE 25 ML/HR: 900 INJECTION, SOLUTION INTRAVENOUS at 09:00

## 2017-07-27 RX ADMIN — FENTANYL CITRATE 25 MCG: 50 INJECTION, SOLUTION INTRAMUSCULAR; INTRAVENOUS at 09:34

## 2017-07-27 RX ADMIN — LIDOCAINE HYDROCHLORIDE 200 MG: 20 INJECTION, SOLUTION INFILTRATION; PERINEURAL at 09:51

## 2017-07-27 RX ADMIN — MIDAZOLAM HYDROCHLORIDE 1 MG: 1 INJECTION, SOLUTION INTRAMUSCULAR; INTRAVENOUS at 09:34

## 2017-07-27 RX ADMIN — FENTANYL CITRATE 50 MCG: 50 INJECTION, SOLUTION INTRAMUSCULAR; INTRAVENOUS at 09:30

## 2017-07-27 NOTE — DISCHARGE INSTRUCTIONS
PORTACATH REMOVAL DISCHARGE INSTRUCTIONS    Home Care Instructions: Your Portacath has been removed. Do not allow bra straps or any clothing to rub the skin over the removal site. Do not bathe or swim until the skin has healed. Once it is healed (usually within 7-10 days), it is okay to bathe and swim. Restrict yourself to light activity for the first 5 days, after that resume normal activity slowly. You may resume your normal diet and medications. Follow-Up Instructions: Please see your oncologist, or the physician who ordered the port removal.  You may remove the dressing in three days and continue to keep the area dry until the incision is healed. Call If: You should call your Physician and/or the Radiology Nurse if you notice redness, pus, swelling, or pain from the area of your incision. Call if you should develop a fever. To Reach Us:   Should you experience any significant changes, please call [phone] between the hours of 7:30 am and 10 pm or [phone] after hours.  After hours, ask the  to page the [address] Technologist, and describe the problem to the technologist.          Patient Signature:  Date: 7/27/2017  Discharging Nurse: Nemo Carpio RN

## 2017-07-27 NOTE — H&P
Radiology History and Physical    Patient: Maureen Beavers III 80 y.o. male       Chief Complaint: No chief complaint on file. History of Present Illness: port removal    History:    Past Medical History:   Diagnosis Date    GERD (gastroesophageal reflux disease)     Raynaud disease     Tonsil cancer ([facility] [address]      Family History   Problem Relation Age of Onset    Dementia Mother     Parkinson's Disease Father      Social History     Social History    Marital status:      Spouse name: N/A    Number of children: N/A    Years of education: N/A     Occupational History    Not on file. Social History Main Topics    Smoking status: Former Smoker    Smokeless tobacco: Never Used    Alcohol use Yes    Drug use: Not on file    Sexual activity: Not on file     Other Topics Concern    Not on file     Social History Narrative       Allergies: Allergies   Allergen Reactions    Pentazocine Other (comments)     Patient unsure of reaction    Talwin [Pentazocine Lactate] Unknown (comments)     Cant remember, happened 40 years ago       Current Medications:  Current Facility-Administered Medications   Medication Dose Route Frequency    lidocaine (XYLOCAINE) 20 mg/mL (2 %) injection 400 mg  20 mL SubCUTAneous RAD ONCE    0.9% sodium chloride infusion  25 mL/hr IntraVENous CONTINUOUS    fentaNYL citrate (PF) injection 100 mcg  100 mcg IntraVENous Multiple    midazolam (VERSED) injection 5 mg  5 mg IntraVENous Rad Multiple        Physical Exam:  Blood pressure 113/68, pulse (!) 54, temperature 97.8 °F (36.6 °C), resp. rate 11, height 5' 8.5\" (1.74 m), weight 69.9 kg (154 lb), SpO2 99 %. LUNG: clear to auscultation bilaterally, HEART: regular rate and rhythm      Alerts:    Hospital Problems  Date Reviewed: 7/18/2017    None          Laboratory:    No results for input(s): HGB, HCT, WBC, PLT, INR, BUN, CREA, K, CRCLT, HGBEXT, HCTEXT, PLTEXT in the last 72 hours.     No lab exists for component: PTT, PT, INREXT      Plan of Care/Planned Procedure:  Risks, benefits, and alternatives reviewed with patient and he agrees to proceed with the procedure.        Yue Cannon MD

## 2017-09-21 ENCOUNTER — HOSPITAL ENCOUNTER (OUTPATIENT)
Dept: GENERAL RADIOLOGY | Age: 82
Discharge: HOME | End: 2017-09-21
Payer: MEDICARE

## 2017-09-21 DIAGNOSIS — J35.8: ICD-10-CM

## 2017-09-21 PROCEDURE — 71020 XR CHEST PA LAT: CPT

## 2018-01-25 ENCOUNTER — OFFICE VISIT (OUTPATIENT)
Dept: ONCOLOGY | Age: 83
End: 2018-01-25

## 2018-01-25 ENCOUNTER — DOCUMENTATION ONLY (OUTPATIENT)
Dept: ONCOLOGY | Age: 83
End: 2018-01-25

## 2018-01-25 VITALS
OXYGEN SATURATION: 98 % | SYSTOLIC BLOOD PRESSURE: 145 MMHG | WEIGHT: 162.6 LBS | RESPIRATION RATE: 16 BRPM | HEIGHT: 69 IN | HEART RATE: 74 BPM | TEMPERATURE: 97.4 F | DIASTOLIC BLOOD PRESSURE: 82 MMHG | BODY MASS INDEX: 24.08 KG/M2

## 2018-01-25 DIAGNOSIS — Z92.21 HISTORY OF CANCER CHEMOTHERAPY: ICD-10-CM

## 2018-01-25 DIAGNOSIS — C09.9 TONSIL CANCER (HCC): Primary | ICD-10-CM

## 2018-01-25 DIAGNOSIS — Z92.3 HISTORY OF THERAPEUTIC RADIATION: ICD-10-CM

## 2018-01-25 RX ORDER — ZOLPIDEM TARTRATE 10 MG/1
10 TABLET ORAL
COMMUNITY
Start: 2017-07-17 | End: 2018-01-25 | Stop reason: SDUPTHER

## 2018-01-25 RX ORDER — MECLIZINE HYDROCHLORIDE 25 MG/1
25 TABLET ORAL
COMMUNITY
Start: 2017-07-17 | End: 2018-01-25 | Stop reason: SDUPTHER

## 2018-01-25 NOTE — PROGRESS NOTES
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 Doreen Baeza is a 80 y.o. male who presents for 6 mo follow-up of tonsil cancer. Prior pt of Dr Luis Antonio Rodrigues seen today by me for first time. Records reviewed. Still sees ENT and rad/onc. No notes in our system since last visit here. Has CT for next week ordered by rad/onc. [facility] at [facility]. Pt is doing ok today. Has had vertigo in past/ none now. Hx GERD. Sees GI for pancreas and seeing them soon. Chronic back pain/ not new. Arthritis. Has labs done by PCP/ ENT. Pt states thyroid was up and f/u with PCP for this. Pt is here alone today. Last visit 7/17: In summer 2016 he noticed a mass in his left neck. He saw Dr. Jsesica Jorge in ENT. After a trial of antibiotics he had an FNA revealing SCC in 7/20/16. Quit smoking in 2016, but was never a heavy smoker. He had treatment as above. Presents today for follow-up. Is weight is going up. Appetite is increasing. He has a \"new normal' with taste. Had MRCP under the care of Dr. Kane Mak who also did an EUS on 6/22/17. His back has been bothering him. Though, this is chronic. He also had this in 2014 and has had x-rays. Back in with a chiropractor. Otherwise, complete ROS is per the symptom report form which has been scanned into the media section of the electronic medical record.       Past Medical History:   Diagnosis Date    GERD (gastroesophageal reflux disease)     Raynaud disease     Tonsil [facility] ([facility] [address]        Past Surgical History:   Procedure Laterality Date    HX GI      colonscopy    HX HEENT      HX OTHER SURGICAL      Bilateral Inguinal Hernias    HX TONSILLECTOMY      August 2016    HX UROLOGICAL      vasectomy    HX VASCULAR ACCESS Allergies   Allergen Reactions    Pentazocine Other (comments)     Patient unsure of reaction    Talwin [Pentazocine Lactate] Unknown (comments)     Cant remember, happened 40 years ago       Current Outpatient Prescriptions   Medication Sig Dispense Refill    meclizine (ANTIVERT) 25 mg tablet Take 1 Tab by mouth three (3) times daily as needed for Dizziness. 20 Tab 0    ibuprofen (ADVIL) 200 mg tablet 200 mg.      artificial saliva (MOUTH KOTE) spra Take 1 Spray by mouth as needed for Other. 240 mL 1    aluminum-magnesium hydroxide 200-200 mg/5 mL susp 30 mL, diphenhydrAMINE 12.5 mg/5 mL elix 75 mg, lidocaine 2 % soln 30 mL oral suspension (compounded) Take 5 mL by mouth Before breakfast, lunch, dinner and at bedtime. 1 Bottle 2    polyethylene glycol (MIRALAX) 17 gram packet Take 1 Packet by mouth daily. 30 Packet 2    zolpidem (AMBIEN) 10 mg tablet Take 10 mg by mouth nightly as needed for Sleep.  diphenhydrAMINE-acetaminophen  mg tab TYLENOL  PM TABS (ACETAMINOPHEN TABS)         Social History     Social History    Marital status:      Spouse name: N/A    Number of children: N/A    Years of education: N/A     Social History Main Topics    Smoking status: Former Smoker    Smokeless tobacco: Never Used    Alcohol use Yes    Drug use: None    Sexual activity: Not Asked     Other Topics Concern    None     Social History Narrative       Family History   Problem Relation Age of Onset    Dementia Mother     Parkinson's Disease Father        ROS  As per the HPI, otherwise a comprehensive ROS is negative.   ECOG PS is 1    Physical Examination:   Visit Vitals    /82 (BP 1 Location: Right arm, BP Patient Position: Sitting)    Pulse 74    Temp 97.4 °F (36.3 °C) (Oral)    Resp 16    Ht 5' 8.5\" (1.74 m)    Wt 162 lb 9.6 oz (73.8 kg)    SpO2 98%    BMI 24.36 kg/m2     General appearance - alert, well appearing, and in no distress  Mental status - oriented to person, place, and time  Mouth - mucous membranes moist, no masses in tonsil area appreciated  Neck - supple, no adenopathy, fullness in treatment area left neck  Chest - clear to auscultation, no wheezes, rales or rhonchi, symmetric air entry  Heart - normal rate, regular rhythm  Abdomen - soft, nontender, nondistended  Neurological - normal speech, no focal findings or movement disorder noted  Musculoskeletal - no joint tenderness, deformity or swelling  Extremities -  no pedal edema, no clubbing or cyanosis  Skin - normal coloration and turgor, no rashes, no suspicious skin lesions noted    LABS  reviewed  Lab Results   Component Value Date/Time    WBC 5.4 07/18/2017 09:10 AM    HGB 13.2 07/18/2017 09:10 AM    HCT 39.0 07/18/2017 09:10 AM    PLATELET 061 94/84/2425 09:10 AM    MCV 94.4 07/18/2017 09:10 AM     Lab Results   Component Value Date/Time    Sodium 136 07/18/2017 09:10 AM    Potassium 4.4 07/18/2017 09:10 AM    Chloride 102 07/18/2017 09:10 AM    CO2 28 07/18/2017 09:10 AM    Anion gap 6 07/18/2017 09:10 AM    Glucose 98 07/18/2017 09:10 AM    BUN 26 07/18/2017 09:10 AM    Creatinine 1.01 07/18/2017 09:10 AM    BUN/Creatinine ratio 26 07/18/2017 09:10 AM    GFR est AA >60 07/18/2017 09:10 AM    GFR est non-AA >60 07/18/2017 09:10 AM    Calcium 8.8 07/18/2017 09:10 AM    Bilirubin, total 0.3 07/18/2017 09:10 AM    AST (SGOT) 15 07/18/2017 09:10 AM    Alk. 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 Margins are close, but negative. T1.   FNA of left neck mass on 7/20/16 with squamous cell carcinoma. P16 positive. IMAGING  PET on 2/13/17 with no foci of abnormal hypermetabolism.   There is a 1.5 cm cystic lesion projected off the body of the pancreas. PET on 8/2/16 with increased metabolic activity in the enlarged left level 2 lymph node with SUV of 11 suspicious for metastatic disease. There is minimal asymmetric increased activity in the left palatine tonsil compared to the right without an obvious mass. ASSESSMENT  Mr. Emilie Rodriguez is a 80 y.o. 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 DISCUSSION/PLAN  1. Stage 2 Tonsil cancer dx 7/16 s/p chemo/ XRT  Prior pt of Dr Anjana Arriaga. Records reviewed. Reviewed history with pt today. PET scan from 2/13/17 with no residual disease. CT neck ordered 2/18 by rad/onc. Continues to see Dr. Juan Whiteside. Encouraged follow-up with her. No sign of local recurrence. Some fullness left neck chronically per pt. Labs per PCP/ ENT per pt. 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 3. Pancreatic cyst.  Had an MRI and EUS under the care of Dr. Christiano Robert. Had biopsies negative. 5. Back pain due to arthritis. Per PCP. 6.  Support good from wife. Here alone today. Follow up in 6 months, sooner if needed.    Call if questions    David De Los Santos, DO

## 2018-01-25 NOTE — PROGRESS NOTES
1. Have you been to the ER, urgent care clinic since your last visit? Hospitalized since your last visit? No    2. Have you seen or consulted any other health care providers outside of the [address] [place] [address] since your last visit? Include any pap smears or colon screening.  Yes saw PCP and gastroenterologist Dr. Ashley Morgan

## 2018-01-25 NOTE — MR AVS SNAPSHOT
[address] [facility] 
[phone] Patient: Lily Kendall 
MRN: [id number] RADU:1/67/5887 Visit Information Date & Time Provider Department Dept. Phone Encounter #  
 1/25/2018 10:00 AM [address]  Oncology at [facility] [id number] Follow-up Instructions Return in about 6 months (around 7/25/2018). Routing History Follow-up and Disposition History Upcoming Health Maintenance Date Due DTaP/Tdap/Td series (1 - Tdap) 7/14/1956 ZOSTER VACCINE AGE 60> 5/14/1995 GLAUCOMA SCREENING Q2Y 7/14/2000 Pneumococcal 65+ High/Highest Risk (1 of 2 - PCV13) 7/14/2000 MEDICARE YEARLY EXAM 7/14/2000 Influenza Age 5 to Adult 8/1/2017 Allergies as of 1/25/2018  Review Complete On: 1/25/2018 By: Juancarlos Briggs DO Severity Noted Reaction Type Reactions Pentazocine  06/23/2009    Other (comments) Patient unsure of reaction Wes [Pentazocine Lactate]  07/20/2016    Unknown (comments) Cant remember, happened 40 years ago Current Immunizations  Reviewed on 7/18/2017 Name Date Influenza Vaccine 8/1/2016 Not reviewed this visit You Were Diagnosed With   
  
 Codes Comments [facility]    -  Primary ICD-10-CM: C09.9 ICD-9-CM: 146.0 History of cancer chemotherapy     ICD-10-CM: Z92.21 
ICD-9-CM: V87.41 History of therapeutic radiation     ICD-10-CM: Z92.3 ICD-9-CM: V15.3 Vitals BP Pulse Temp Resp Height(growth percentile) Weight(growth percentile) 145/82 (BP 1 Location: Right arm, BP Patient Position: Sitting) 74 97.4 °F (36.3 °C) (Oral) 16 5' 8.5\" (1.74 m) 162 lb 9.6 oz (73.8 kg) SpO2 BMI Smoking Status 98% 24.36 kg/m2 Former Smoker Vitals History BMI and BSA Data Body Mass Index Body Surface Area  
 24.36 kg/m 2 1.89 m 2 Preferred Pharmacy Pharmacy Name Phone [phone] [place], [place] [address] Your Updated Medication List  
  
   
This list is accurate as of: 1/25/18 10:43 AM.  Always use your most recent med list. ADVIL 200 mg tablet Generic drug:  ibuprofen  
200 mg.  
  
 aluminum-magnesium hydroxide 200-200 mg/5 mL susp 30 mL, diphenhydrAMINE 12.5 mg/5 mL elix 75 mg, lidocaine 2 % soln 30 mL oral suspension (compounded) Take 5 mL by mouth Before breakfast, lunch, dinner and at bedtime. artificial saliva Spra Commonly known as:  Trenia Yadira Take 1 Spray by mouth as needed for Other. diphenhydrAMINE-acetaminophen  mg Tab TYLENOL  PM TABS (ACETAMINOPHEN TABS)  
  
 meclizine 25 mg tablet Commonly known as:  ANTIVERT Take 1 Tab by mouth three (3) times daily as needed for Dizziness. polyethylene glycol 17 gram packet Commonly known as:  [place] Devoid Take 1 Packet by mouth daily. zolpidem 10 mg tablet Commonly known as:  AMBIEN Take 10 mg by mouth nightly as needed for Sleep. Follow-up Instructions Return in about 6 months (around 7/25/2018). To-Do List   
 02/02/2018 8:00 AM  
  Appointment with [facility] at [facility] [email or web address] ([phone]) DIET RESTRICTIONS - NPO, do not eat or drink 4 hours prior to test.  GENERAL INSTRUCTIONS - Bring a list of all medications you are currently taking, including over the counter medications. - Only patients will be allowed in the exam room. This includes children. - Children under the age of 15 may not be left unattended. - [facility] patients must have an armband and be accompanied by a caregiver or family member.  - If you have a hand-written prescription for this exam, you must bring it with you on the day of your exam. - Bring all relevant prior images from any facility outside of [employer] [facility] with you on the day of your exam. Failure to provide images may delay reading by Radiologist.  If you have questions or need to reschedule or cancel your appointment, call [phone]. Introducing [facility]! [facility] introduces [employer] patient portal. Now you can access parts of your medical record, email your doctor's office, and request medication refills online. 1. In your internet browser, go to [email or web address] 2. Click on the First Time User? Click Here link in the Sign In box. You will see the New Member Sign Up page. 3. Enter your [employer] Access Code exactly as it appears below. You will not need to use this code after youve completed the sign-up process. If you do not sign up before the expiration date, you must request a new code. · [employer] Access Code: [email or web address] Expires: 4/25/2018 10:43 AM 
 
4. Enter the last four digits of your Social Security Number (xxxx) and Date of Birth (mm/dd/yyyy) as indicated and click Submit. You will be taken to the next sign-up page. 5. Create a [employer] ID. This will be your [employer] login ID and cannot be changed, so think of one that is secure and easy to remember. 6. Create a [employer] password. You can change your password at any time. 7. Enter your Password Reset Question and Answer. This can be used at a later time if you forget your password. 8. Enter your e-mail address. You will receive e-mail notification when new information is available in [address] 9. Click Sign Up. You can now view and download portions of your medical record. 10. Click the Download Summary menu link to download a portable copy of your medical information. If you have questions, please visit the Frequently Asked Questions section of the [employer] website. Remember, [employer] is NOT to be used for urgent needs. For medical emergencies, dial 911. Now available from your iPhone and Android! Please provide this summary of care documentation to your next provider. Your primary care clinician is listed as Coretta Noriega. If you have any questions after today's visit, please call [phone].

## 2018-07-30 ENCOUNTER — OFFICE VISIT (OUTPATIENT)
Dept: ONCOLOGY | Age: 83
End: 2018-07-30

## 2018-07-30 VITALS
SYSTOLIC BLOOD PRESSURE: 160 MMHG | DIASTOLIC BLOOD PRESSURE: 80 MMHG | WEIGHT: 164 LBS | HEIGHT: 68 IN | HEART RATE: 73 BPM | TEMPERATURE: 97.8 F | OXYGEN SATURATION: 97 % | BODY MASS INDEX: 24.86 KG/M2 | RESPIRATION RATE: 16 BRPM

## 2018-07-30 DIAGNOSIS — Z85.818 HISTORY OF CANCER TONSIL: Primary | ICD-10-CM

## 2018-07-30 DIAGNOSIS — Z92.3 HISTORY OF THERAPEUTIC RADIATION: ICD-10-CM

## 2018-07-30 DIAGNOSIS — Z92.21 HISTORY OF CANCER CHEMOTHERAPY: ICD-10-CM

## 2018-07-30 RX ORDER — FAMOTIDINE 20 MG/1
1 TABLET, FILM COATED ORAL
COMMUNITY
Start: 2012-01-17

## 2018-07-30 RX ORDER — CAPSAICIN 0.03 G/100G
CREAM TOPICAL
COMMUNITY
Start: 2017-12-21 | End: 2018-12-21

## 2018-07-30 RX ORDER — ACETAMINOPHEN/DIPHENHYDRAMINE 500MG-25MG
1 TABLET ORAL
COMMUNITY
Start: 2010-04-08

## 2018-07-30 RX ORDER — POLYETHYLENE GLYCOL 3350 17 G/17G
17 POWDER, FOR SOLUTION ORAL
COMMUNITY
Start: 2016-12-01 | End: 2018-07-30 | Stop reason: SDUPTHER

## 2018-07-30 NOTE — PROGRESS NOTES
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 [facility] is a 80 y.o. male who presents for 6 mo follow-up of tonsil cancer. Prior pt of Dr Niles Ortiz. Still sees ENT and rad/onc. No notes in our system since last visit here. Had CT 7/18 good. Pt is doing ok today. No new issues. Chronic back pain/ not new. Arthritis. Has labs done by PCP/ ENT. Pt is here alone today. Otherwise, complete ROS is per the symptom report form which has been scanned into the media section of the electronic medical record. Past Medical History:   Diagnosis Date    GERD (gastroesophageal reflux disease)     Raynaud disease     Tonsil cancer ([address]        Past Surgical History:   Procedure Laterality Date    HX GI      colonscopy    HX HEENT      HX OTHER SURGICAL      Bilateral Inguinal Hernias    HX TONSILLECTOMY      August 2016    HX UROLOGICAL      vasectomy    HX VASCULAR ACCESS         Allergies   Allergen Reactions    Pentazocine Other (comments)     Patient unsure of reaction    Talwin [Pentazocine Lactate] Unknown (comments)     Cant remember, happened 40 years ago       Current Outpatient Prescriptions   Medication Sig Dispense Refill    famotidine (PEPCID) 20 mg tablet Take 1 Tab by mouth.  diphenhydrAMINE-acetaminophen  mg tab Take 1 Tab by mouth.  ibuprofen (ADVIL) 200 mg tablet 200 mg.  polyethylene glycol (MIRALAX) 17 gram packet Take 1 Packet by mouth daily. 30 Packet 2    zolpidem (AMBIEN) 10 mg tablet Take 10 mg by mouth nightly as needed for Sleep.  capsicum oleoresin 0.025 % topical cream Apply  to affected area.  meclizine (ANTIVERT) 25 mg tablet Take 1 Tab by mouth three (3) times daily as needed for Dizziness.  20 Tab 0    artificial saliva (MOUTH KOTE) spra Take 1 Spray by mouth as needed for Other. 240 mL 1    aluminum-magnesium hydroxide 200-200 mg/5 mL susp 30 mL, diphenhydrAMINE 12.5 mg/5 mL elix 75 mg, lidocaine 2 % soln 30 mL oral suspension (compounded) Take 5 mL by mouth Before breakfast, lunch, dinner and at bedtime. 1 Bottle 2       Social History     Social History    Marital status:      Spouse name: N/A    Number of children: N/A    Years of education: N/A     Social History Main Topics    Smoking status: Former Smoker    Smokeless tobacco: Never Used    Alcohol use Yes    Drug use: None    Sexual activity: Not Asked     Other Topics Concern    None     Social History Narrative       Family History   Problem Relation Age of Onset    Dementia Mother     Parkinson's Disease Father        ROS  As per the HPI, otherwise a comprehensive ROS is negative.   ECOG PS is 1    Physical Examination:   Visit Vitals    /80    Pulse 73    Temp 97.8 °F (36.6 °C) (Oral)    Resp 16    Ht 5' 8\" (1.727 m)    Wt 164 lb (74.4 kg)    SpO2 97%    BMI 24.94 kg/m2     General appearance - alert, well appearing, and in no distress  Mental status - oriented to person, place, and time  Mouth - mucous membranes moist, no masses in tonsil area appreciated  Neck - supple, no adenopathy, fullness in treatment area left neck  Chest - clear to auscultation, no wheezes, rales or rhonchi, symmetric air entry  Heart - normal rate, regular rhythm  Abdomen - soft, nontender, nondistended  Neurological - normal speech, no focal findings or movement disorder noted  Musculoskeletal - no joint tenderness, deformity or swelling  Extremities -  no pedal edema, no clubbing or cyanosis  Skin - normal coloration and turgor, no rashes, no suspicious skin lesions noted    LABS  Per PCP  Lab Results   Component Value Date/Time    WBC 5.4 07/18/2017 09:10 AM    HGB 13.2 07/18/2017 09:10 AM    HCT 39.0 07/18/2017 09:10 AM    PLATELET 225 07/18/2017 09:10 AM    MCV 94.4 07/18/2017 09:10 AM     Lab Results   Component Value Date/Time    Sodium 136 07/18/2017 09:10 AM    Potassium 4.4 07/18/2017 09:10 AM    Chloride 102 07/18/2017 09:10 AM    CO2 28 07/18/2017 09:10 AM    Anion gap 6 07/18/2017 09:10 AM    Glucose 98 07/18/2017 09:10 AM    BUN 24 (H) 07/23/2018 07:55 AM    Creatinine 1.12 07/23/2018 07:55 AM    BUN/Creatinine ratio 26 (H) 07/18/2017 09:10 AM    GFR est AA >60 07/23/2018 07:55 AM    GFR est non-AA >60 07/23/2018 07:55 AM    Calcium 8.8 07/18/2017 09:10 AM    Bilirubin, total 0.3 07/18/2017 09:10 AM    AST (SGOT) 15 07/18/2017 09:10 AM    Alk. 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 Margins are close, but negative. T1.   FNA of left neck mass on 7/20/16 with squamous cell carcinoma. P16 positive. IMAGING  PET on 2/13/17 with no foci of abnormal hypermetabolism. There is a 1.5 cm cystic lesion projected off the body of the pancreas. PET on 8/2/16 with increased metabolic activity in the enlarged left level 2 lymph node with SUV of 11 suspicious for metastatic disease. There is minimal asymmetric increased activity in the left palatine tonsil compared to the right without an obvious mass. ASSESSMENT/ PLAN:     Mr. Leatha Quiros is a 80 y.o. 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 1. Stage 2 Tonsil cancer dx 7/16 s/p chemo/ XRT  Prior pt of Dr Ora Police. Records reviewed. Reviewed history with pt today. PET scan from 2/13/17 with no residual disease. CT neck ordered 7/18 by rad/onc good. Continues to see Dr. MariaE lena Venegas ENT for f/u. No sign of local recurrence.   Some fullness left neck chronically per pt.  Labs per PCP/ ENT per pt. 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 3. Pancreatic cyst.  Had an MRI and EUS under the care of Dr. Doreen Lu. Had biopsies negative. 5. Back pain due to arthritis. Per PCP. 6.  Support good from wife. Here alone today. Follow up in 6 months, sooner if needed.    Call if questions    [place] Done, DO

## 2018-07-30 NOTE — MR AVS SNAPSHOT
[address] [facility] [address] [place] [address] 
[phone] Patient: Cira Rocha 
MRN: [id number] PRE:1/02/0374 Visit Information Date & Time Provider Department Dept. Phone Encounter #  
 7/30/2018  8:30 AM Tina Joshi DO [facility] Oncology at [facility] [phone] Follow-up Instructions Return in about 6 months (around 1/30/2019). Routing History Follow-up and Disposition History Your Appointments 1/28/2019  9:00 AM  
Follow Up with Tina Joshi DO [facility] Oncology at [facility]) Appt Note: 6mo fu (tonsil ca) [address] [id number]  
[phone]  
  
   
 [address] [facility] [address] Upcoming Health Maintenance Date Due DTaP/Tdap/Td series (1 - Tdap) 7/14/1956 ZOSTER VACCINE AGE 60> 5/14/1995 GLAUCOMA SCREENING Q2Y 7/14/2000 Pneumococcal 65+ High/Highest Risk (1 of 2 - PCV13) 7/14/2000 MEDICARE YEARLY EXAM 3/14/2018 Influenza Age 5 to Adult 8/1/2018 Allergies as of 7/30/2018  Review Complete On: 7/30/2018 By: Tina Joshi DO Severity Noted Reaction Type Reactions Pentazocine  06/23/2009    Other (comments) Patient unsure of reaction Talwin [Pentazocine Lactate]  07/20/2016    Unknown (comments) Cant remember, happened 40 years ago Current Immunizations  Reviewed on 7/30/2018 Name Date  Influenza High Dose Vaccine PF 10/13/2017 12:00 AM, 8/30/2016 12:00 AM, 12/3/2015 12:00 AM  
 Influenza Vaccine 8/1/2016, 11/14/2014 12:00 AM, 1/7/2014 12:00 AM, 9/11/2012 12:00 AM, 1/4/2011 12:00 AM, 12/2/2009 12:00 AM  
 Pneumococcal Conjugate (PCV-13) 1/4/2016 12:00 AM  
 Pneumococcal Polysaccharide (PPSV-23) 5/8/2007 12:00 AM  
 Td 5/8/2007 12:00 AM  
 Zoster Vaccine, Live 12/3/2009 12:00 AM  
  
 Reviewed by Barrett Morley LPN on 0/53/5537 at  9:00 AM  
 Reviewed by Barrett Morley LPN on 9/60/4680 at  9:00 AM  
You Were Diagnosed With   
  
 Codes Comments History of cancer tonsil    -  Primary ICD-10-CM: P79.390 ICD-9-CM: V10.02 History of cancer chemotherapy     ICD-10-CM: Z92.21 
ICD-9-CM: V87.41 History of therapeutic radiation     ICD-10-CM: Z92.3 ICD-9-CM: V15.3 Vitals BP Pulse Temp Resp Height(growth percentile) Weight(growth percentile) 160/80 73 97.8 °F (36.6 °C) (Oral) 16 5' 8\" (1.727 m) 164 lb (74.4 kg) SpO2 BMI Smoking Status 97% 24.94 kg/m2 Former Smoker Vitals History BMI and BSA Data Body Mass Index Body Surface Area 24.94 kg/m 2 1.89 m 2 Preferred Pharmacy Pharmacy Name Phone [phone] [place] [address] - [address] Your Updated Medication List  
  
   
This list is accurate as of 7/30/18  9:39 AM.  Always use your most recent med list. ADVIL 200 mg tablet Generic drug:  ibuprofen  
200 mg.  
  
 aluminum-magnesium hydroxide 200-200 mg/5 mL susp 30 mL, diphenhydrAMINE 12.5 mg/5 mL elix 75 mg, lidocaine 2 % soln 30 mL oral suspension (compounded) Take 5 mL by mouth Before breakfast, lunch, dinner and at bedtime. artificial saliva Spra Commonly known as:  [place] Fanti Take 1 Spray by mouth as needed for Other. capsicum oleoresin 0.025 % topical cream  
Apply  to affected area. diphenhydrAMINE-acetaminophen  mg Tab Take 1 Tab by mouth.  
  
 meclizine 25 mg tablet Commonly known as:  ANTIVERT Take 1 Tab by mouth three (3) times daily as needed for Dizziness. PEPCID 20 mg tablet Generic drug:  famotidine Take 1 Tab by mouth.  
  
 polyethylene glycol 17 gram packet Commonly known as:  Alanna Edmond Take 1 Packet by mouth daily. zolpidem 10 mg tablet Commonly known as:  AMBIEN  
 Take 10 mg by mouth nightly as needed for Sleep. Follow-up Instructions Return in about 6 months (around 1/30/2019). Introducing [facility]! Meir Diaz introduces [employer] patient portal. Now you can access parts of your medical record, email your doctor's office, and request medication refills online. 1. In your internet browser, go to [email or web address] 2. Click on the First Time User? Click Here link in the Sign In box. You will see the New Member Sign Up page. 3. Enter your [employer] Access Code exactly as it appears below. You will not need to use this code after youve completed the sign-up process. If you do not sign up before the expiration date, you must request a new code. · [employer] Access Code: [email or web address] Expires: 10/18/2018  5:31 PM 
 
4. Enter the last four digits of your Social Security Number (xxxx) and Date of Birth (mm/dd/yyyy) as indicated and click Submit. You will be taken to the next sign-up page. 5. Create a [employer] ID. This will be your [employer] login ID and cannot be changed, so think of one that is secure and easy to remember. 6. Create a [employer] password. You can change your password at any time. 7. Enter your Password Reset Question and Answer. This can be used at a later time if you forget your password. 8. Enter your e-mail address. You will receive e-mail notification when new information is available in [address] 9. Click Sign Up. You can now view and download portions of your medical record. 10. Click the Download Summary menu link to download a portable copy of your medical information. If you have questions, please visit the Frequently Asked Questions section of the [employer] website. Remember, [employer] is NOT to be used for urgent needs. For medical emergencies, dial 911. Now available from your iPhone and Android! Please provide this summary of care documentation to your next provider. Your primary care clinician is listed as Liberty Barker. If you have any questions after today's visit, please call [phone].

## 2018-08-08 ENCOUNTER — APPOINTMENT (OUTPATIENT)
Dept: INFUSION THERAPY | Age: 83
End: 2018-08-08

## 2018-09-10 NOTE — PROGRESS NOTES
Elizabeth Marino is a 80 y.o. male here today for tonsil cancer f/u.  Patient states he has upper back pain 5/10 Review of Systems/Medical History  Patient summary reviewed  Chart reviewed  No history of anesthetic complications     Cardiovascular  Hypertension controlled,    Pulmonary  Negative pulmonary ROS        GI/Hepatic  Negative GI/hepatic ROS          Kidney stones,        Endo/Other  Diabetes well controlled type 2 Diet controlled,      GYN       Hematology  Negative hematology ROS      Musculoskeletal  Negative musculoskeletal ROS        Neurology  Negative neurology ROS      Psychology   Negative psychology ROS              Physical Exam    Airway    Mallampati score: II  TM Distance: >3 FB  Neck ROM: full     Dental   No notable dental hx     Cardiovascular  Rhythm: regular, Rate: normal, Cardiovascular exam normal    Pulmonary  Pulmonary exam normal Breath sounds clear to auscultation,     Other Findings        Anesthesia Plan  ASA Score- 2     Anesthesia Type- general with ASA Monitors  Additional Monitors:   Airway Plan:         Plan Factors-    Induction- intravenous  Postoperative Plan- Plan for postoperative opioid use  Informed Consent- Anesthetic plan and risks discussed with patient

## 2019-01-28 ENCOUNTER — DOCUMENTATION ONLY (OUTPATIENT)
Dept: ONCOLOGY | Age: 84
End: 2019-01-28

## 2019-01-28 ENCOUNTER — OFFICE VISIT (OUTPATIENT)
Dept: ONCOLOGY | Age: 84
End: 2019-01-28

## 2019-01-28 VITALS
RESPIRATION RATE: 16 BRPM | DIASTOLIC BLOOD PRESSURE: 78 MMHG | HEIGHT: 68 IN | SYSTOLIC BLOOD PRESSURE: 134 MMHG | WEIGHT: 166 LBS | TEMPERATURE: 97.7 F | HEART RATE: 81 BPM | OXYGEN SATURATION: 98 % | BODY MASS INDEX: 25.16 KG/M2

## 2019-01-28 DIAGNOSIS — Z92.3 HISTORY OF THERAPEUTIC RADIATION: ICD-10-CM

## 2019-01-28 DIAGNOSIS — K86.89 PANCREATIC MASS: ICD-10-CM

## 2019-01-28 DIAGNOSIS — Z85.818 HISTORY OF CANCER TONSIL: Primary | ICD-10-CM

## 2019-01-28 DIAGNOSIS — Z92.21 HISTORY OF CANCER CHEMOTHERAPY: ICD-10-CM

## 2019-01-28 NOTE — LETTER
1/28/2019 10:00 AM 
 
Patient:  Ayla Kenney YOB: 1935 Date of Visit: 1/28/2019 Dear No Recipients: Thank you for referring Mr. Roberth Omalley to me for evaluation/treatment. Below are the relevant portions of my assessment and plan of care. If you have questions, please do not hesitate to call me. I look forward to following Almita Schmitt along with you. Sincerely, Rosalino Judd, DO

## 2019-01-28 NOTE — PROGRESS NOTES
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 Alexandro Moore is a 80 y.o. male who presents for 6 mo follow-up of tonsil cancer. Prior pt of Dr Mimi Breaux. Still sees ENT and rad/onc. Good visits with them per pt. Had CT 7/18 good. Pt is doing ok today. States he has to keep liquids in mouth for a while before swallowing due to dry mouth. Using wife's mouth wash ME which works well. No dysphagia. Chronic back pain/ not new. Arthritis. Has labs done by PCP/ ENT. No records here from them. Pt is here alone today. Otherwise, complete ROS is per the symptom report form which has been scanned into the media section of the electronic medical record. Past Medical History:  
Diagnosis Date  GERD (gastroesophageal reflux disease)  Raynaud disease  Tonsil cancer ([facility] [address] Past Surgical History:  
Procedure Laterality Date  HX GI    
 colonscopy  HX HEENT    
 HX OTHER SURGICAL Bilateral Inguinal Hernias  HX TONSILLECTOMY August 2016  HX UROLOGICAL    
 vasectomy  HX VASCULAR ACCESS Allergies Allergen Reactions  Pentazocine Other (comments) Patient unsure of reaction  Talwin [Pentazocine Lactate] Unknown (comments) Cant remember, happened 40 years ago Current Outpatient Medications Medication Sig Dispense Refill  zolpidem (AMBIEN) 10 mg tablet Take 10 mg by mouth nightly as needed for Sleep.  famotidine (PEPCID) 20 mg tablet Take 1 Tab by mouth.  diphenhydrAMINE-acetaminophen  mg tab Take 1 Tab by mouth.  meclizine (ANTIVERT) 25 mg tablet Take 1 Tab by mouth three (3) times daily as needed for Dizziness. 20 Tab 0  ibuprofen (ADVIL) 200 mg tablet 200 mg.  artificial saliva (MOUTH KOTE) spra Take 1 Spray by mouth as needed for Other. 240 mL 1  
 aluminum-magnesium hydroxide 200-200 mg/5 mL susp 30 mL, diphenhydrAMINE 12.5 mg/5 mL elix 75 mg, lidocaine 2 % soln 30 mL oral suspension (compounded) Take 5 mL by mouth Before breakfast, lunch, dinner and at bedtime. 1 Bottle 2  
 polyethylene glycol (MIRALAX) 17 gram packet Take 1 Packet by mouth daily. 30 Packet 2 Social History Socioeconomic History  Marital status:  Spouse name: Not on file  Number of children: Not on file  Years of education: Not on file  Highest education level: Not on file Tobacco Use  Smoking status: Former Smoker  Smokeless tobacco: Never Used Substance and Sexual Activity  Alcohol use: Yes Family History Problem Relation Age of Onset  Dementia Mother  Parkinson's Disease Father ROS As per the HPI, otherwise a comprehensive ROS is negative. ECOG PS is 1 Physical Examination:  
Visit Vitals /78 Pulse 81 Temp 97.7 °F (36.5 °C) (Oral) Resp 16 Ht 5' 8\" (1.727 m) Wt 166 lb (75.3 kg) SpO2 98% BMI 25.24 kg/m² General appearance - alert, well appearing, and in no distress Mental status - oriented to person, place, and time Mouth - mucous membranes moist, no masses in tonsil area appreciated Neck - supple, no adenopathy, fullness in treatment area left neck Chest - clear to auscultation, no wheezes, rales or rhonchi, symmetric air entry Heart - normal rate, regular rhythm Abdomen - soft, nontender, nondistended Neurological - normal speech, no focal findings or movement disorder noted Musculoskeletal - no joint tenderness, deformity or swelling Extremities -  no pedal edema, no clubbing or cyanosis Skin - normal coloration and turgor, no rashes, no suspicious skin lesions noted LABS Per PCP Lab Results Component Value Date/Time  WBC 5.4 07/18/2017 09:10 AM  
 HGB 13.2 07/18/2017 09:10 AM  
 HCT 39.0 07/18/2017 09:10 AM  
 PLATELET 810 21/36/9285 09:10 AM  
 MCV 94.4 07/18/2017 09:10 AM  
 
Lab Results Component Value Date/Time Sodium 136 07/18/2017 09:10 AM  
 Potassium 4.4 07/18/2017 09:10 AM  
 Chloride 102 07/18/2017 09:10 AM  
 CO2 28 07/18/2017 09:10 AM  
 Anion gap 6 07/18/2017 09:10 AM  
 Glucose 98 07/18/2017 09:10 AM  
 BUN 24 (H) 07/23/2018 07:55 AM  
 Creatinine 1.12 07/23/2018 07:55 AM  
 BUN/Creatinine ratio 26 (H) 07/18/2017 09:10 AM  
 GFR est AA >60 07/23/2018 07:55 AM  
 GFR est non-AA >60 07/23/2018 07:55 AM  
 Calcium 8.8 07/18/2017 09:10 AM  
 Bilirubin, total 0.3 07/18/2017 09:10 AM  
 AST (SGOT) 15 07/18/2017 09:10 AM  
 Alk. phosphatase 71 07/18/2017 09:10 AM  
 Protein, total 6.8 07/18/2017 09:10 AM  
 Albumin 3.7 07/18/2017 09:10 AM  
 Globulin 3.1 07/18/2017 09:10 AM  
 A-G Ratio 1.2 07/18/2017 09:10 AM  
 ALT (SGPT) 12 07/18/2017 09:10 AM  
 
 
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 Margins are close, but negative. T1.  
FNA of left neck mass on 7/20/16 with squamous cell carcinoma. P16 positive. IMAGING 
 
CT Results (most recent): 
Results from Hospital Encounter encounter on 07/23/18 CT NECK SOFT TISSUE W WO CONT Narrative Indication: Malignant neoplasm of tonsil. TECHNIQUE: Multiphase thin section axial CT images of the neck with and without IV contrast. Coronal and sagittal reformatted images. COMPARISON: CT neck February 2, 2018. September 13, 2016 FINDINGS: 
There is no lymphadenopathy using size of the discriminating factor. No evidence of residual or recurrent tonsillar mass. Limited evaluation of oral 
soft tissues secondary to beam hardening dental artifact, images 24 through 35 
of series 3. Vascular structures follow normal course and caliber. The thyroid gland appears normal. 
 
The imaged lung apices are clear. Central airways are patent. No destructive osseous lesions. Multilevel cervical spine discogenic 
degenerative changes. Impression IMPRESSION:  
No evidence for residual or recurrent neoplastic process. The following CT dose reduction techniques were utilized: automated exposure 
control and/or adjustment of the mA and/or kV according to patient size, and the 
use of iterative reconstruction technique PET on 2/13/17 with no foci of abnormal hypermetabolism. There is a 1.5 cm cystic lesion projected off the body of the pancreas. PET on 8/2/16 with increased metabolic activity in the enlarged left level 2 lymph node with SUV of 11 suspicious for metastatic disease. There is minimal asymmetric increased activity in the left palatine tonsil compared to the right without an obvious mass. ASSESSMENT/ PLAN:  
 
Mr. Lakisha Weinstein is a 80 y.o. 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 1. Stage 2 Tonsil cancer dx 7/16 hx of chemo/ XRT Prior pt of Dr Adolfo Tomas. PET scan from 2/13/17 with no residual disease. CT neck 7/18 good by rad/onc good. Continues to see Dr. Tha Beltrán ENT for f/u and rad/onc also. No sign of local recurrence. Some fullness left neck chronically per pt. Will re check CT neck 7/19 at [facility] per pt preference. Pt clinically doing well without any new issues. Labs per PCP/ ENT per pt. 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 3. Pancreatic cyst.  Had an MRI and EUS under the care of Dr. Kelli Alexander. Had biopsies negative. 5. Back pain due to arthritis. Per PCP. 6.  Support good from wife. Here alone today. Lives near [facility]. Follow up in 12 months, sooner if needed. Call if questions Tanvi Handing, DO

## 2019-07-03 ENCOUNTER — OFFICE VISIT (OUTPATIENT)
Dept: ONCOLOGY | Age: 84
End: 2019-07-03

## 2019-07-03 VITALS
SYSTOLIC BLOOD PRESSURE: 143 MMHG | HEART RATE: 79 BPM | BODY MASS INDEX: 24.43 KG/M2 | WEIGHT: 161.2 LBS | OXYGEN SATURATION: 95 % | RESPIRATION RATE: 16 BRPM | HEIGHT: 68 IN | DIASTOLIC BLOOD PRESSURE: 78 MMHG | TEMPERATURE: 97.8 F

## 2019-07-03 DIAGNOSIS — R68.2 DRY MOUTH: ICD-10-CM

## 2019-07-03 DIAGNOSIS — Z85.818 HISTORY OF CANCER TONSIL: Primary | ICD-10-CM

## 2019-07-03 DIAGNOSIS — Z92.3 HISTORY OF THERAPEUTIC RADIATION: ICD-10-CM

## 2019-07-03 DIAGNOSIS — Z92.21 HISTORY OF CANCER CHEMOTHERAPY: ICD-10-CM

## 2019-07-03 NOTE — LETTER
7/3/19 Patient: Deangelo Haney YOB: 1935 Date of Visit: 7/3/2019 Prosper Sandoval MD 
[address] [address] VIA Facsimile: [phone] Dear Prosper Sandoval MD, Thank you for referring Mr. Radha Washington to [address] [place] [address] for evaluation. My notes for this consultation are attached. If you have questions, please do not hesitate to call me. I look forward to following your patient along with you. Sincerely, Tawnya Bearden, DO

## 2019-07-03 NOTE — PROGRESS NOTES
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 Liz Estrada is a 80 y.o. male who presents for 6 mo follow-up of tonsil cancer. Prior pt of Dr Servando Case. Still sees ENT and rad/onc. Good visits with them per pt. Had CT 7/19 good. Pt is doing ok today. Lost [place] and sad about this. Has labs done by PCP/ ENT. No records here from them. Pt is here alone today. Otherwise, complete ROS is per the symptom report form which has been scanned into the media section of the electronic medical record. Past Medical History:   Diagnosis Date    GERD (gastroesophageal reflux disease)     Raynaud disease     Tonsil cancer ([facility] [address]        Past Surgical History:   Procedure Laterality Date    HX GI      colonscopy    HX HEENT      HX OTHER SURGICAL      Bilateral Inguinal Hernias    HX TONSILLECTOMY      August 2016    HX UROLOGICAL      vasectomy    HX VASCULAR ACCESS         Allergies   Allergen Reactions    Pentazocine Other (comments)     Patient unsure of reaction    Talwin [Pentazocine Lactate] Unknown (comments)     Cant remember, happened 40 years ago       Current Outpatient Medications   Medication Sig Dispense Refill    famotidine (PEPCID) 20 mg tablet Take 1 Tab by mouth.  ibuprofen (ADVIL) 200 mg tablet 200 mg.      zolpidem (AMBIEN) 10 mg tablet Take 10 mg by mouth nightly as needed for Sleep.  diphenhydrAMINE-acetaminophen  mg tab Take 1 Tab by mouth.  meclizine (ANTIVERT) 25 mg tablet Take 1 Tab by mouth three (3) times daily as needed for Dizziness. 20 Tab 0    artificial saliva (MOUTH KOTE) spra Take 1 Spray by mouth as needed for Other.  240 mL 1    aluminum-magnesium hydroxide 200-200 mg/5 mL susp 30 mL, diphenhydrAMINE 12.5 mg/5 mL elix 75 mg, lidocaine 2 % soln 30 mL oral suspension (compounded) Take 5 mL by mouth Before breakfast, lunch, dinner and at bedtime. 1 Bottle 2    polyethylene glycol (MIRALAX) 17 gram packet Take 1 Packet by mouth daily. 27 Packet 2       Social History     Socioeconomic History    Marital status:      Spouse name: Not on file    Number of children: Not on file    Years of education: Not on file    Highest education level: Not on file   Tobacco Use    Smoking status: Former Smoker    Smokeless tobacco: Never Used   Substance and Sexual Activity    Alcohol use: Yes       Family History   Problem Relation Age of Onset    Dementia Mother     Parkinson's Disease Father        ROS  As per the HPI, otherwise a comprehensive ROS is negative.   ECOG PS is 1    Physical Examination:   Visit Vitals  /78 (BP 1 Location: Left arm, BP Patient Position: Sitting)   Pulse 79   Temp 97.8 °F (36.6 °C) (Oral)   Resp 16   Ht 5' 8\" (1.727 m)   Wt 161 lb 3.2 oz (73.1 kg)   SpO2 95%   BMI 24.51 kg/m²     General appearance - alert, well appearing, and in no distress  Mental status - oriented to person, place, and time  Mouth - mucous membranes moist, no masses in tonsil area appreciated  Neck - supple, no adenopathy, fullness in treatment area left neck  Chest - clear to auscultation, no wheezes, rales or rhonchi, symmetric air entry  Heart - normal rate, regular rhythm  Abdomen - soft, nontender, nondistended  Neurological - normal speech, no focal findings or movement disorder noted  Musculoskeletal - no joint tenderness, deformity or swelling  Extremities -  no pedal edema, no clubbing or cyanosis  Skin - normal coloration and turgor, no rashes, no suspicious skin lesions noted    LABS  Per PCP  Lab Results   Component Value Date/Time    WBC 5.4 07/18/2017 09:10 AM    HGB 13.2 07/18/2017 09:10 AM    HCT 39.0 07/18/2017 09:10 AM    PLATELET 163 83/59/4628 09:10 AM    MCV 94.4 07/18/2017 09:10 AM     Lab Results   Component Value Date/Time    Sodium 136 07/18/2017 09:10 AM    Potassium 4.4 07/18/2017 09:10 AM    Chloride 102 07/18/2017 09:10 AM    CO2 28 07/18/2017 09:10 AM    Anion gap 6 07/18/2017 09:10 AM    Glucose 98 07/18/2017 09:10 AM    BUN 21 (H) 07/01/2019 08:08 AM    Creatinine 1.11 07/01/2019 08:08 AM    BUN/Creatinine ratio 26 (H) 07/18/2017 09:10 AM    GFR est AA >60 07/01/2019 08:08 AM    GFR est non-AA >60 07/01/2019 08:08 AM    Calcium 8.8 07/18/2017 09:10 AM    Bilirubin, total 0.3 07/18/2017 09:10 AM    AST (SGOT) 15 07/18/2017 09:10 AM    Alk. 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 Margins are close, but negative. T1.   FNA of left neck mass on 7/20/16 with squamous cell carcinoma. P16 positive. IMAGING    CT Results (most recent):  Results from Hospital Encounter encounter on 07/01/19   CT NECK SOFT TISSUE W CONT    Narrative EXAM:  CT NECK SOFT TISSUE W CONT    CLINICAL INFORMATION: f/u tonsil cancer not on any therapy to be done at \[facility]\""  COMPARISON:  CT of 7/23/2018     TECHNIQUE: Axial neck CT was performed  following the IV injection of 100 cc  Isovue-300. Dianne Higgins Coronal  and sagittal and sagittal reformatted images were  generated. CT dose reduction was achieved through the use of a standardized  protocol tailored for this examination and automatic exposure control for dose  modulation. FINDINGS:    VISUALIZED ORBITS, PARANASAL SINUSES, AND SKULL BASE: No significant  abnormalities. NASOPHARYNX:  Within normal limits. SUPRAHYOID NECK:  No significant abnormalities in the  oropharynx, oral cavity,  parapharyngeal space, and retropharyngeal space.  No locally recurrent tonsillar  mass. INFRAHYOID NECK:  Unchanged asymmetry of the piriform sinus with slight  effacement on the left. No discrete masses. Possible slight medialization of the  left true vocal cord, also unchanged. No masses. THYROID:  Normal.  SALIVARY GLANDS: No masses. Submandibular glands relatively small in size,  particularly on the left, which may be due to prior radiation. Unchanged  appearance. THORACIC INLET: Lung apices clear. No adenopathy. LYMPH NODES: No lymph node enlargement or heterogeneity. VASCULAR STRUCTURES:  Patent. BONES: No significant abnormalities. OTHER FINDINGS:  None. Impression IMPRESSION:      No recurrent disease or adenopathy demonstrated in the neck. Unchanged  appearance since 7/23/2018. PET on 2/13/17 with no foci of abnormal hypermetabolism. There is a 1.5 cm cystic lesion projected off the body of the pancreas. PET on 8/2/16 with increased metabolic activity in the enlarged left level 2 lymph node with SUV of 11 suspicious for metastatic disease. There is minimal asymmetric increased activity in the left palatine tonsil compared to the right without an obvious mass. ASSESSMENT/ PLAN:     Mr. Michael Hernandez is a 80 y.o. 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 1. Stage 2 Tonsil cancer dx 7/16 hx of chemo/ XRT  Prior pt of Dr Johnathan Han. CT neck 7/19 good   Continues to see Dr. Fernando Juarez ENT for f/u and rad/onc also. No sign of local recurrence. Some fullness left neck chronically per pt. Pt clinically doing well without any new issues. Labs per PCP/ ENT per pt. 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 Using ER OTC and helping. 3. Pancreatic cyst.  Had an MRI 2017 and EUS under the care of Dr. Norma Garcia. Had biopsies negative. Pt is not sure he wants this followed up anymore. 5. Back pain due to arthritis. Per PCP. 6.  Psychosocial.  Grief over death of dog. Support good from wife.   Here alone today. Lives near [facility]. Follow up in 12 months, sooner if needed.    Call if questions    Abigail Buck, DO

## 2019-07-03 NOTE — LETTER
7/3/2019 10:05 AM 
 
Patient:  Ward Kayser YOB: 1935 Date of Visit: 7/3/2019 Dear No Recipients: Thank you for referring Mr. Colten Lane to me for evaluation/treatment. Below are the relevant portions of my assessment and plan of care. If you have questions, please do not hesitate to call me. I look forward to following Mr. Amando Farris along with you. Sincerely, [facility] Staff, DO

## 2019-07-03 NOTE — PROGRESS NOTES
Mohinder Joshi is a 80 y.o. male     Chief Complaint   Patient presents with    Cancer     tonsil cancer 6 month follow up       Visit Vitals  /78 (BP 1 Location: Left arm, BP Patient Position: Sitting)   Pulse 79   Temp 97.8 °F (36.6 °C) (Oral)   Resp 16   Ht 5' 8\" (1.727 m)   Wt 161 lb 3.2 oz (73.1 kg)   SpO2 95%   BMI 24.51 kg/m²       Health Maintenance Due   Topic Date Due    Shingrix Vaccine Age 49> (1 of 2) 07/14/1985    GLAUCOMA SCREENING Q2Y  07/14/2000    DTaP/Tdap/Td series (1 - Tdap) 05/09/2007    MEDICARE YEARLY EXAM  03/14/2018       1. Have you been to the ER, urgent care clinic since your last visit? Hospitalized since your last visit? No    2. Have you seen or consulted any other health care providers outside of the [address] [place] [address] since your last visit? Include any pap smears or colon screening.  No

## 2020-07-06 ENCOUNTER — OFFICE VISIT (OUTPATIENT)
Dept: ONCOLOGY | Age: 85
End: 2020-07-06

## 2020-07-06 VITALS
SYSTOLIC BLOOD PRESSURE: 188 MMHG | HEIGHT: 68 IN | TEMPERATURE: 97.7 F | HEART RATE: 86 BPM | WEIGHT: 161.4 LBS | DIASTOLIC BLOOD PRESSURE: 94 MMHG | BODY MASS INDEX: 24.46 KG/M2 | OXYGEN SATURATION: 97 %

## 2020-07-06 DIAGNOSIS — Z92.3 HISTORY OF THERAPEUTIC RADIATION: ICD-10-CM

## 2020-07-06 DIAGNOSIS — Z85.818 HISTORY OF CANCER TONSIL: Primary | ICD-10-CM

## 2020-07-06 DIAGNOSIS — Z92.21 HISTORY OF CANCER CHEMOTHERAPY: ICD-10-CM

## 2020-07-06 NOTE — LETTER
7/6/20 Patient: Tate Srinivasan YOB: 1935 Date of Visit: 7/6/2020 Spencer Enriquez MD 
[address] [address] VIA Facsimile: [phone] Dear Spencer Enriquez MD, Thank you for referring Mr. Martin Saldana to [address] [place] [address] for evaluation. My notes for this consultation are attached. If you have questions, please do not hesitate to call me. I look forward to following your patient along with you. Sincerely, Shanice Archibald, DO

## 2020-07-06 NOTE — PROGRESS NOTES
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 Cade Ramirez is a 80 y.o. male who presents for 12 mo follow-up of tonsil cancer. Prior pt of Dr Lizette Engel. Still sees ENT and rad/onc. Seeing ENT 9/20. To see Rad/onc again soon. Had CT 7/19 good. Pt is doing ok today. Has labs done by PCP/ ENT. No records here from them. Pt is here alone today. States not concerned about COVID. Wants to do CT this year. Otherwise, complete ROS is per the symptom report form which has been scanned into the media section of the electronic medical record. Past Medical History:   Diagnosis Date    GERD (gastroesophageal reflux disease)     Raynaud disease     Tonsil cancer ([facility] [address]        Past Surgical History:   Procedure Laterality Date    HX GI      colonscopy    HX HEENT      HX OTHER SURGICAL      Bilateral Inguinal Hernias    HX TONSILLECTOMY      August 2016    HX UROLOGICAL      vasectomy    HX VASCULAR ACCESS         Allergies   Allergen Reactions    Pentazocine Other (comments)     Patient unsure of reaction    Talwin [Pentazocine Lactate] Unknown (comments)     Cant remember, happened 40 years ago       Current Outpatient Medications   Medication Sig Dispense Refill    famotidine (PEPCID) 20 mg tablet Take 1 Tab by mouth.  diphenhydrAMINE-acetaminophen  mg tab Take 1 Tab by mouth.  meclizine (ANTIVERT) 25 mg tablet Take 1 Tab by mouth three (3) times daily as needed for Dizziness. 20 Tab 0    ibuprofen (ADVIL) 200 mg tablet 200 mg.      artificial saliva (MOUTH KOTE) spra Take 1 Spray by mouth as needed for Other.  240 mL 1    aluminum-magnesium hydroxide 200-200 mg/5 mL susp 30 mL, diphenhydrAMINE 12.5 mg/5 mL elix 75 mg, lidocaine 2 % soln 30 mL oral suspension (compounded) Take 5 mL by mouth Before breakfast, lunch, dinner and at bedtime. 1 Bottle 2    polyethylene glycol (MIRALAX) 17 gram packet Take 1 Packet by mouth daily. 30 Packet 2    zolpidem (AMBIEN) 10 mg tablet Take 10 mg by mouth nightly as needed for Sleep. Social History     Socioeconomic History    Marital status:      Spouse name: Not on file    Number of children: Not on file    Years of education: Not on file    Highest education level: Not on file   Tobacco Use    Smoking status: Former Smoker    Smokeless tobacco: Never Used   Substance and Sexual Activity    Alcohol use: Yes       Family History   Problem Relation Age of Onset    Dementia Mother     Parkinson's Disease Father        ROS  As per the HPI, otherwise a comprehensive ROS is negative.   ECOG PS is 1    Physical Examination:   Visit Vitals  BP (!) 188/94   Pulse 86   Temp 97.7 °F (36.5 °C)   Ht 5' 8\" (1.727 m)   Wt 161 lb 6.4 oz (73.2 kg)   SpO2 97%   BMI 24.54 kg/m²     bp up today and good at home  General appearance - alert, well appearing, and in no distress  Mental status - oriented to person, place, and time  Mouth - mucous membranes moist, no masses in tonsil area appreciated  Neck - supple, no adenopathy  Chest - clear to auscultation, no wheezes, rales or rhonchi, symmetric air entry  Heart - normal rate, regular rhythm  Abdomen - soft, nontender, nondistended  Neurological - normal speech, no focal findings   Musculoskeletal - no joint tenderness, deformity or swelling  Extremities -  no pedal edema, no clubbing or cyanosis  Skin - normal coloration and turgor, no rashes, no suspicious skin lesions noted    LABS  Per PCP  Lab Results   Component Value Date/Time    WBC 5.4 07/18/2017 09:10 AM    HGB 13.2 07/18/2017 09:10 AM    HCT 39.0 07/18/2017 09:10 AM    PLATELET 287 70/76/8683 09:10 AM    MCV 94.4 07/18/2017 09:10 AM     Lab Results   Component Value Date/Time    Sodium 136 07/18/2017 09:10 AM    Potassium 4.4 07/18/2017 09:10 AM    Chloride 102 07/18/2017 09:10 AM    CO2 28 07/18/2017 09:10 AM    Anion gap 6 07/18/2017 09:10 AM    Glucose 98 07/18/2017 09:10 AM    BUN 21 (H) 07/01/2019 08:08 AM    Creatinine 1.11 07/01/2019 08:08 AM    BUN/Creatinine ratio 26 (H) 07/18/2017 09:10 AM    GFR est AA >60 07/01/2019 08:08 AM    GFR est non-AA >60 07/01/2019 08:08 AM    Calcium 8.8 07/18/2017 09:10 AM    Bilirubin, total 0.3 07/18/2017 09:10 AM    Alk. 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 Margins are close, but negative. T1.   FNA of left neck mass on 7/20/16 with squamous cell carcinoma. P16 positive. IMAGING    CT Results (most recent):  Results from Hospital Encounter encounter on 07/01/19   CT NECK SOFT TISSUE W CONT    Narrative EXAM:  CT NECK SOFT TISSUE W CONT    CLINICAL INFORMATION: f/u tonsil cancer not on any therapy to be done at [facility]  COMPARISON:  CT of 7/23/2018     TECHNIQUE: Axial neck CT was performed  following the IV injection of 100 cc  Isovue-300. Gerldine Prost Coronal  and sagittal and sagittal reformatted images were  generated. CT dose reduction was achieved through the use of a standardized  protocol tailored for this examination and automatic exposure control for dose  modulation. FINDINGS:    VISUALIZED ORBITS, PARANASAL SINUSES, AND SKULL BASE: No significant  abnormalities. NASOPHARYNX:  Within normal limits. SUPRAHYOID NECK:  No significant abnormalities in the  oropharynx, oral cavity,  parapharyngeal space, and retropharyngeal space. No locally recurrent tonsillar  mass.   INFRAHYOID NECK:  Unchanged asymmetry of the piriform sinus with slight  effacement on the left. No discrete masses. Possible slight medialization of the  left true vocal cord, also unchanged. No masses. THYROID:  Normal.  SALIVARY GLANDS: No masses. Submandibular glands relatively small in size,  particularly on the left, which may be due to prior radiation. Unchanged  appearance. THORACIC INLET: Lung apices clear. No adenopathy. LYMPH NODES: No lymph node enlargement or heterogeneity. VASCULAR STRUCTURES:  Patent. BONES: No significant abnormalities. OTHER FINDINGS:  None. Impression IMPRESSION:      No recurrent disease or adenopathy demonstrated in the neck. Unchanged  appearance since 7/23/2018. PET on 2/13/17 with no foci of abnormal hypermetabolism. There is a 1.5 cm cystic lesion projected off the body of the pancreas. PET on 8/2/16 with increased metabolic activity in the enlarged left level 2 lymph node with SUV of 11 suspicious for metastatic disease. There is minimal asymmetric increased activity in the left palatine tonsil compared to the right without an obvious mass. ASSESSMENT/ PLAN:     Mr. Nathan Baig is a 80 y.o. 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 1. Stage 2 Tonsil cancer dx 7/16 hx of chemo/ XRT  Prior pt of Dr Agatha Campos. CT neck 7/19 good   Continues to see Dr. Roby Maravilla ENT for f/u and rad/onc also. No sign of local recurrence. Some fullness left neck chronically per pt. Pt wants to do CT neck again this year at [facility]. Ordered. Pt clinically doing well without any new issues. Labs per PCP/ ENT per pt. 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 3. Pancreatic cyst.  Had an MRI 2017 and EUS under the care of Dr. Pamella Thomason. Had biopsies negative. Pt did not want this followed up anymore. 5. Back pain due to arthritis. Per PCP. 6.  Psychosocial.  Mood good. Support good from wife. Here alone today. Lives near [facility]. Follow up in 12 months, sooner if needed.    Call if questions    Sofie Patiño Boni Rooney

## 2020-07-06 NOTE — PROGRESS NOTES
Ninfa Hunt is a 80 y.o. male  Chief Complaint   Patient presents with    Follow-up     Tonsil cancer      1. Have you been to the ER, urgent care clinic since your last visit? Hospitalized since your last visit? No    2. Have you seen or consulted any other health care providers outside of the [address] [place] [address] since your last visit? Include any pap smears or colon screening.  No

## 2021-01-01 NOTE — TELEPHONE ENCOUNTER
[address] Charting report to provider for signature. [Well-balanced] : well-balanced [Formula ___ oz/feed] : [unfilled] oz of formula per feed [Formula ___ oz in 24hrs] : [unfilled] oz of formula in 24 hours [Hours between feeds ___] : Child is fed every [unfilled] hours [Normal] : Normal [In Bassinet/Crib] : sleeps in bassinet/crib [On back] : sleeps on back [Pacifier use] : Pacifier use [No] : No cigarette smoke exposure [Water heater temperature set at <120 degrees F] : Water heater temperature set at <120 degrees F [Rear facing car seat in back seat] : Rear facing car seat in back seat [Carbon Monoxide Detectors] : Carbon monoxide detectors at home [Smoke Detectors] : Smoke detectors at home. [Co-sleeping] : no co-sleeping [Sleeps 12-16 hours per 24 hours (including naps)] : Does not sleep 12-16 hours per 24 hours (including naps) [Loose bedding, pillow, toys, and/or bumpers in crib] : no loose bedding, pillow, toys, and/or bumpers in crib [Exposure to electronic nicotine delivery system] : No exposure to electronic nicotine delivery system [Gun in Home] : No gun in home [FreeTextEntry1] : 4 month old female here for well care she is thriving on formula and sleeps through the night

## 2021-06-07 ENCOUNTER — TELEPHONE (OUTPATIENT)
Dept: ONCOLOGY | Age: 86
End: 2021-06-07

## 2021-06-25 ENCOUNTER — HOSPITAL ENCOUNTER (EMERGENCY)
Age: 86
Discharge: ANOTHER HOSPITAL | End: 2021-06-26
Attending: EMERGENCY MEDICINE
Payer: MEDICARE

## 2021-06-25 ENCOUNTER — APPOINTMENT (OUTPATIENT)
Dept: GENERAL RADIOLOGY | Age: 86
End: 2021-06-25
Attending: EMERGENCY MEDICINE
Payer: MEDICARE

## 2021-06-25 ENCOUNTER — APPOINTMENT (OUTPATIENT)
Dept: CT IMAGING | Age: 86
End: 2021-06-25
Attending: EMERGENCY MEDICINE
Payer: MEDICARE

## 2021-06-25 DIAGNOSIS — G45.9 TIA (TRANSIENT ISCHEMIC ATTACK): ICD-10-CM

## 2021-06-25 DIAGNOSIS — R53.1 ACUTE LEFT-SIDED WEAKNESS: Primary | ICD-10-CM

## 2021-06-25 LAB
ALBUMIN SERPL-MCNC: 3.6 G/DL (ref 3.5–5)
ALBUMIN/GLOB SERPL: 1.1 {RATIO} (ref 1.1–2.2)
ALP SERPL-CCNC: 69 U/L (ref 45–117)
ALT SERPL-CCNC: 11 U/L (ref 12–78)
ANION GAP SERPL CALC-SCNC: 9 MMOL/L (ref 5–15)
APPEARANCE UR: CLEAR
APTT PPP: 23.7 SEC (ref 22.1–31)
AST SERPL-CCNC: 19 U/L (ref 15–37)
BASOPHILS # BLD: 0.1 K/UL (ref 0–0.1)
BASOPHILS NFR BLD: 1 % (ref 0–1)
BILIRUB SERPL-MCNC: 0.2 MG/DL (ref 0.2–1)
BILIRUB UR QL: NEGATIVE
BUN SERPL-MCNC: 21 MG/DL (ref 6–20)
BUN/CREAT SERPL: 20 (ref 12–20)
CALCIUM SERPL-MCNC: 9 MG/DL (ref 8.5–10.1)
CHLORIDE SERPL-SCNC: 104 MMOL/L (ref 97–108)
CO2 SERPL-SCNC: 28 MMOL/L (ref 21–32)
COLOR UR: NORMAL
CREAT SERPL-MCNC: 1.05 MG/DL (ref 0.7–1.3)
DIFFERENTIAL METHOD BLD: NORMAL
EOSINOPHIL # BLD: 0.2 K/UL (ref 0–0.4)
EOSINOPHIL NFR BLD: 2 % (ref 0–7)
ERYTHROCYTE [DISTWIDTH] IN BLOOD BY AUTOMATED COUNT: 13.6 % (ref 11.5–14.5)
GLOBULIN SER CALC-MCNC: 3.3 G/DL (ref 2–4)
GLUCOSE BLD STRIP.AUTO-MCNC: 100 MG/DL (ref 65–117)
GLUCOSE SERPL-MCNC: 89 MG/DL (ref 65–100)
GLUCOSE UR STRIP.AUTO-MCNC: NEGATIVE MG/DL
HCT VFR BLD AUTO: 42.3 % (ref 36.6–50.3)
HGB BLD-MCNC: 14.5 G/DL (ref 12.1–17)
HGB UR QL STRIP: NEGATIVE
IMM GRANULOCYTES # BLD AUTO: 0 K/UL (ref 0–0.04)
IMM GRANULOCYTES NFR BLD AUTO: 0 % (ref 0–0.5)
INR PPP: 1 (ref 0.9–1.1)
KETONES UR QL STRIP.AUTO: NEGATIVE MG/DL
LEUKOCYTE ESTERASE UR QL STRIP.AUTO: NEGATIVE
LYMPHOCYTES # BLD: 2.5 K/UL (ref 0.8–3.5)
LYMPHOCYTES NFR BLD: 33 % (ref 12–49)
MCH RBC QN AUTO: 31.9 PG (ref 26–34)
MCHC RBC AUTO-ENTMCNC: 34.3 G/DL (ref 30–36.5)
MCV RBC AUTO: 93.2 FL (ref 80–99)
MONOCYTES # BLD: 0.7 K/UL (ref 0–1)
MONOCYTES NFR BLD: 9 % (ref 5–13)
NEUTS SEG # BLD: 4.1 K/UL (ref 1.8–8)
NEUTS SEG NFR BLD: 55 % (ref 32–75)
NITRITE UR QL STRIP.AUTO: NEGATIVE
NRBC # BLD: 0 K/UL (ref 0–0.01)
NRBC BLD-RTO: 0 PER 100 WBC
PH UR STRIP: 6 [PH] (ref 5–8)
PLATELET # BLD AUTO: 244 K/UL (ref 150–400)
PMV BLD AUTO: 9.9 FL (ref 8.9–12.9)
POTASSIUM SERPL-SCNC: 4 MMOL/L (ref 3.5–5.1)
PROT SERPL-MCNC: 6.9 G/DL (ref 6.4–8.2)
PROT UR STRIP-MCNC: NEGATIVE MG/DL
PROTHROMBIN TIME: 9.4 SEC (ref 9–11.1)
RBC # BLD AUTO: 4.54 M/UL (ref 4.1–5.7)
SERVICE CMNT-IMP: NORMAL
SODIUM SERPL-SCNC: 141 MMOL/L (ref 136–145)
SP GR UR REFRACTOMETRY: 1.01 (ref 1–1.03)
THERAPEUTIC RANGE,PTTT: NORMAL SECS (ref 58–77)
TROPONIN I SERPL-MCNC: <0.05 NG/ML
UROBILINOGEN UR QL STRIP.AUTO: 0.2 EU/DL (ref 0.2–1)
WBC # BLD AUTO: 7.5 K/UL (ref 4.1–11.1)

## 2021-06-25 PROCEDURE — 84484 ASSAY OF TROPONIN QUANT: CPT

## 2021-06-25 PROCEDURE — 74011250637 HC RX REV CODE- 250/637: Performed by: EMERGENCY MEDICINE

## 2021-06-25 PROCEDURE — 74011000636 HC RX REV CODE- 636: Performed by: EMERGENCY MEDICINE

## 2021-06-25 PROCEDURE — 85730 THROMBOPLASTIN TIME PARTIAL: CPT

## 2021-06-25 PROCEDURE — 81003 URINALYSIS AUTO W/O SCOPE: CPT

## 2021-06-25 PROCEDURE — 71045 X-RAY EXAM CHEST 1 VIEW: CPT

## 2021-06-25 PROCEDURE — 82962 GLUCOSE BLOOD TEST: CPT

## 2021-06-25 PROCEDURE — 70496 CT ANGIOGRAPHY HEAD: CPT

## 2021-06-25 PROCEDURE — 85610 PROTHROMBIN TIME: CPT

## 2021-06-25 PROCEDURE — 99285 EMERGENCY DEPT VISIT HI MDM: CPT

## 2021-06-25 PROCEDURE — 70450 CT HEAD/BRAIN W/O DYE: CPT

## 2021-06-25 PROCEDURE — 93005 ELECTROCARDIOGRAM TRACING: CPT

## 2021-06-25 PROCEDURE — 80053 COMPREHEN METABOLIC PANEL: CPT

## 2021-06-25 PROCEDURE — 85025 COMPLETE CBC W/AUTO DIFF WBC: CPT

## 2021-06-25 PROCEDURE — 36415 COLL VENOUS BLD VENIPUNCTURE: CPT

## 2021-06-25 RX ORDER — GUAIFENESIN 100 MG/5ML
325 LIQUID (ML) ORAL
Status: COMPLETED | OUTPATIENT
Start: 2021-06-25 | End: 2021-06-25

## 2021-06-25 RX ORDER — LABETALOL HCL 20 MG/4 ML
5 SYRINGE (ML) INTRAVENOUS
Status: DISCONTINUED | OUTPATIENT
Start: 2021-06-25 | End: 2021-06-26 | Stop reason: HOSPADM

## 2021-06-25 RX ADMIN — IOPAMIDOL 100 ML: 755 INJECTION, SOLUTION INTRAVENOUS at 20:55

## 2021-06-25 RX ADMIN — ASPIRIN 81 MG CHEWABLE TABLET 324 MG: 81 TABLET CHEWABLE at 21:04

## 2021-06-25 NOTE — ED PROVIDER NOTES
EMERGENCY DEPARTMENT HISTORY AND PHYSICAL EXAM      Date: 6/25/2021  Patient Name: Ishan Atwood III    History of Presenting Illness     Chief Complaint   Patient presents with    Extremity Weakness       History Provided By: Patient and EMS    HPI:   The history is provided by the patient. Extremity Weakness   This is a new problem. The current episode started less than 1 hour ago. The problem has been resolved. The patient is experiencing no pain. Associated symptoms include numbness and tingling. Pertinent negatives include no back pain and no neck pain. He has tried nothing for the symptoms. The treatment provided no relief. There has been no history of extremity trauma. Ishan Atwood III, 80 y.o. male  presents to the ED with cc of possible stroke. Patient apparently around 6:35 PM developed some weakness on the left side. Wife reported he also had a facial droop by report. Patient is unsure which side was affected he denies any pain. Symptoms resolved after 10 to 15 minutes. EMS reports he had some gait instability and tingling on the left side when they arrived. He denies any recent trauma falls or injury. Denies any neck pain chest pain shortness of breath. He denies any history of high blood pressure diabetes high cholesterol, does not take an aspirin every day. He is a former smoker. Denies any history of stroke. There are no other complaints, changes, or physical findings at this time. PCP: Alexx Cason    No current facility-administered medications on file prior to encounter. Current Outpatient Medications on File Prior to Encounter   Medication Sig Dispense Refill    famotidine (PEPCID) 20 mg tablet Take 1 Tab by mouth.  diphenhydrAMINE-acetaminophen  mg tab Take 1 Tab by mouth.  meclizine (ANTIVERT) 25 mg tablet Take 1 Tab by mouth three (3) times daily as needed for Dizziness.  20 Tab 0    ibuprofen (ADVIL) 200 mg tablet 200 mg.      artificial saliva (MOUTH KOTE) spra Take 1 Spray by mouth as needed for Other. 240 mL 1    aluminum-magnesium hydroxide 200-200 mg/5 mL susp 30 mL, diphenhydrAMINE 12.5 mg/5 mL elix 75 mg, lidocaine 2 % soln 30 mL oral suspension (compounded) Take 5 mL by mouth Before breakfast, lunch, dinner and at bedtime. 1 Bottle 2    polyethylene glycol (MIRALAX) 17 gram packet Take 1 Packet by mouth daily. 30 Packet 2    zolpidem (AMBIEN) 10 mg tablet Take 10 mg by mouth nightly as needed for Sleep. Past History     Past Medical History:  Past Medical History:   Diagnosis Date    GERD (gastroesophageal reflux disease)     Raynaud disease     Tonsil cancer ([facility] [address]        Past Surgical History:  Past Surgical History:   Procedure Laterality Date    HX GI      colonscopy    HX HEENT      HX OTHER SURGICAL      Bilateral Inguinal Hernias    HX TONSILLECTOMY      August 2016    HX UROLOGICAL      vasectomy    HX VASCULAR ACCESS         Family History:  Family History   Problem Relation Age of Onset    Dementia Mother     Parkinson's Disease Father        Social History:  Social History     Tobacco Use    Smoking status: Former Smoker    Smokeless tobacco: Never Used   Substance Use Topics    Alcohol use: Yes    Drug use: Not on file       Allergies: Allergies   Allergen Reactions    Pentazocine Other (comments)     Patient unsure of reaction    Talwin [Pentazocine Lactate] Unknown (comments)     Cant remember, happened 40 years ago         Review of Systems   Review of Systems   Constitutional: Negative. Negative for chills and fever. HENT: Negative. Negative for congestion and sore throat. Eyes: Negative. Negative for discharge and redness. Respiratory: Negative. Negative for cough and shortness of breath. Cardiovascular: Negative. Negative for chest pain and palpitations. Gastrointestinal: Negative. Negative for abdominal pain, nausea and vomiting. Genitourinary: Negative.   Negative for dysuria, flank pain and frequency. Musculoskeletal: Negative. Negative for arthralgias, back pain and neck pain. Skin: Negative. Negative for rash and wound. Allergic/Immunologic: Negative. Neurological: Positive for tingling, facial asymmetry, weakness and numbness. Negative for dizziness, syncope, speech difficulty and headaches. Hematological: Negative. Negative for adenopathy. Does not bruise/bleed easily. Psychiatric/Behavioral: Negative. Negative for confusion. The patient is not nervous/anxious. All other systems reviewed and are negative. Physical Exam   Physical Exam  Vitals and nursing note reviewed. Constitutional:       General: He is not in acute distress. Appearance: Normal appearance. He is well-developed and normal weight. He is not ill-appearing, toxic-appearing or diaphoretic. HENT:      Head: Normocephalic and atraumatic. Nose: Nose normal.      Mouth/Throat:      Mouth: Mucous membranes are moist.      Pharynx: Oropharynx is clear. Eyes:      Extraocular Movements: Extraocular movements intact. Conjunctiva/sclera: Conjunctivae normal.      Pupils: Pupils are equal, round, and reactive to light. Cardiovascular:      Rate and Rhythm: Normal rate and regular rhythm. Pulses: Normal pulses. Pulmonary:      Effort: Pulmonary effort is normal. No respiratory distress. Abdominal:      General: There is no distension. Palpations: Abdomen is soft. Musculoskeletal:         General: No swelling or tenderness. Normal range of motion. Cervical back: Normal range of motion and neck supple. No rigidity or tenderness. No muscular tenderness. Skin:     General: Skin is warm and dry. Capillary Refill: Capillary refill takes less than 2 seconds. Findings: No erythema or rash. Neurological:      General: No focal deficit present. Mental Status: He is alert and oriented to person, place, and time. Mental status is at baseline. Cranial Nerves: No cranial nerve deficit. Sensory: Sensory deficit present. Motor: No weakness. Coordination: Coordination normal.      Comments: Decreased sensation left upper extremity to pinprick, no other acute abnormality seen. No facial droop,  strength is equal.  No drift noted on exam.  Visual fields intact. Psychiatric:         Mood and Affect: Mood normal.         Behavior: Behavior normal.         Thought Content: Thought content normal.         Judgment: Judgment normal.         Diagnostic Study Results     Labs -     Recent Results (from the past 12 hour(s))   GLUCOSE, POC    Collection Time: 06/25/21  7:46 PM   Result Value Ref Range    Glucose (POC) 100 65 - 117 mg/dL    Performed by Wu Gillespie    CBC WITH AUTOMATED DIFF    Collection Time: 06/25/21  8:05 PM   Result Value Ref Range    WBC 7.5 4.1 - 11.1 K/uL    RBC 4.54 4.10 - 5.70 M/uL    HGB 14.5 12.1 - 17.0 g/dL    HCT 42.3 36.6 - 50.3 %    MCV 93.2 80.0 - 99.0 FL    MCH 31.9 26.0 - 34.0 PG    MCHC 34.3 30.0 - 36.5 g/dL    RDW 13.6 11.5 - 14.5 %    PLATELET 354 622 - 121 K/uL    MPV 9.9 8.9 - 12.9 FL    NRBC 0.0 0  WBC    ABSOLUTE NRBC 0.00 0.00 - 0.01 K/uL    NEUTROPHILS 55 32 - 75 %    LYMPHOCYTES 33 12 - 49 %    MONOCYTES 9 5 - 13 %    EOSINOPHILS 2 0 - 7 %    BASOPHILS 1 0 - 1 %    IMMATURE GRANULOCYTES 0 0.0 - 0.5 %    ABS. NEUTROPHILS 4.1 1.8 - 8.0 K/UL    ABS. LYMPHOCYTES 2.5 0.8 - 3.5 K/UL    ABS. MONOCYTES 0.7 0.0 - 1.0 K/UL    ABS. EOSINOPHILS 0.2 0.0 - 0.4 K/UL    ABS. BASOPHILS 0.1 0.0 - 0.1 K/UL    ABS. IMM.  GRANS. 0.0 0.00 - 0.04 K/UL    DF AUTOMATED     METABOLIC PANEL, COMPREHENSIVE    Collection Time: 06/25/21  8:05 PM   Result Value Ref Range    Sodium 141 136 - 145 mmol/L    Potassium 4.0 3.5 - 5.1 mmol/L    Chloride 104 97 - 108 mmol/L    CO2 28 21 - 32 mmol/L    Anion gap 9 5 - 15 mmol/L    Glucose 89 65 - 100 mg/dL    BUN 21 (H) 6 - 20 MG/DL    Creatinine 1.05 0.70 - 1.30 MG/DL    BUN/Creatinine ratio 20 12 - 20      GFR est AA >60 >60 ml/min/1.73m2    GFR est non-AA >60 >60 ml/min/1.73m2    Calcium 9.0 8.5 - 10.1 MG/DL    Bilirubin, total 0.2 0.2 - 1.0 MG/DL    ALT (SGPT) 11 (L) 12 - 78 U/L    AST (SGOT) 19 15 - 37 U/L    Alk.  phosphatase 69 45 - 117 U/L    Protein, total 6.9 6.4 - 8.2 g/dL    Albumin 3.6 3.5 - 5.0 g/dL    Globulin 3.3 2.0 - 4.0 g/dL    A-G Ratio 1.1 1.1 - 2.2     PROTHROMBIN TIME + INR    Collection Time: 06/25/21  8:05 PM   Result Value Ref Range    INR 1.0 0.9 - 1.1      Prothrombin time 9.4 9.0 - 11.1 sec   TROPONIN I    Collection Time: 06/25/21  8:05 PM   Result Value Ref Range    Troponin-I, Qt. <0.05 <0.05 ng/mL   PTT    Collection Time: 06/25/21  8:05 PM   Result Value Ref Range    aPTT 23.7 22.1 - 31.0 sec    aPTT, therapeutic range     58.0 - 77.0 SECS   URINALYSIS W/ RFLX MICROSCOPIC    Collection Time: 06/25/21  9:09 PM   Result Value Ref Range    Color YELLOW/STRAW      Appearance CLEAR CLEAR      Specific gravity 1.010 1.003 - 1.030      pH (UA) 6.0 5.0 - 8.0      Protein Negative NEG mg/dL    Glucose Negative NEG mg/dL    Ketone Negative NEG mg/dL    Bilirubin Negative NEG      Blood Negative NEG      Urobilinogen 0.2 0.2 - 1.0 EU/dL    Nitrites Negative NEG      Leukocyte Esterase Negative NEG     EKG, 12 LEAD, INITIAL    Collection Time: 06/25/21  9:22 PM   Result Value Ref Range    Ventricular Rate 56 BPM    Atrial Rate 56 BPM    P-R Interval 154 ms    QRS Duration 102 ms    Q-T Interval 460 ms    QTC Calculation (Bezet) 443 ms    Calculated P Axis 58 degrees    Calculated R Axis 26 degrees    Calculated T Axis 80 degrees    Diagnosis       Sinus bradycardia  Incomplete right bundle branch block  Borderline ECG  No previous ECGs available         Radiologic Studies -   CTA CODE NEURO HEAD AND NECK W CONT         XR CHEST PORT   Final Result   No acute cardiopulmonary process seen      CT CODE NEURO HEAD WO CONTRAST   Final Result      Interval development of ischemic infarct in the anterior limb of the right   internal capsule. Equivocal change in the right MCA, not confirmed on the second axial series. No acute process identified by noncontrast CT. CT Results  (Last 48 hours)               06/25/21 2054  CTA CODE NEURO HEAD AND NECK W CONT Preliminary result    Narrative:  **PRELIMINARY REPORT**       Atherosclerosis of the aorta, common carotid arteries, and carotid bifurcations. No large vessel occlusion. Preliminary report was provided by Dr. Shashank Kulkarni, the on-call radiologist, at 2102   hours       Final report to follow. **END PRELIMINARY REPORT**                               06/25/21 1951  CT CODE NEURO HEAD WO CONTRAST Final result    Impression:      Interval development of ischemic infarct in the anterior limb of the right   internal capsule. Equivocal change in the right MCA, not confirmed on the second axial series. No acute process identified by noncontrast CT. Narrative:  EXAM: CT CODE NEURO HEAD WO CONTRAST       INDICATION: Code Stroke       COMPARISON: 6/30/2017. TECHNIQUE: Unenhanced CT of the head was performed using 5 mm images. Brain and   bone windows were generated. CT dose reduction was achieved through use of a   standardized protocol tailored for this examination and automatic exposure   control for dose modulation. FINDINGS:   The ventricles and sulci are normal in size, shape and configuration and   midline. There has been interval development of a discrete 4 mm hypodensity in   the anterior limb of the right internal capsule. . There is no intracranial   hemorrhage, extra-axial collection, mass, mass effect or midline shift. The   basilar cisterns are open. No acute infarct is identified. The bone windows   demonstrate no abnormalities. The visualized portions of the paranasal sinuses   and mastoid air cells are clear.  The right MCA appears to be denser than the   right on the first axial set, but this is not convincing on the second axial   set. CXR Results  (Last 48 hours)               06/25/21 2022  XR CHEST PORT Final result    Impression:  No acute cardiopulmonary process seen       Narrative:  EXAM: XR CHEST PORT       INDICATION: CVA       COMPARISON: 9/21/2017       FINDINGS: A portable AP radiograph of the chest was obtained at 2015 hours   hours. The patient is on a cardiac monitor. The lungs are clear. The cardiac and   mediastinal contours and pulmonary vascularity are normal.  The bones and soft   tissues are grossly within normal limits. Heavy costochondral calcification is   seen. Medical Decision Making   I am the first provider for this patient. I reviewed the vital signs, available nursing notes, past medical history, past surgical history, family history and social history. Vital Signs-Reviewed the patient's vital signs. Patient Vitals for the past 12 hrs:   Temp Pulse Resp BP SpO2   06/25/21 2135  72 16 (!) 181/82 99 %   06/25/21 1938 98.9 °F (37.2 °C) 71 16 (!) 194/84 98 %           EKG interpretation: (Preliminary)  Rhythm: sinus bradycardia;  regular . Rate (approx.): 56; Blocks: RBBB; Ectopy: noneAxis: normal; P wave: normal; QRS interval: normal ; ST/T wave: non-specific changes; in  Lead: ; Other findings: .        Records Reviewed: Nursing Notes, Old Medical Records and Ambulance Run Sheet    Provider Notes (Medical Decision Making):   Patient with acute onset left-sided weakness consistent with stroke TIA, will initiate work-up and have neurology evaluate. ED Course:   Initial assessment performed. The patients presenting problems have been discussed, and they are in agreement with the care plan formulated and outlined with them. I have encouraged them to ask questions as they arise throughout their visit.     ED Course as of Jun 25 2140 Fri Jun 25, 2021 1942 Teleneuro paged    [MF]   1948 Spoke with teleneurology    [MF]   2038 Updated pt on CT findings and plan for transfer, neuro consult info. Wife at bedside. Pt wants to go to [facility]    []   2104 Updated pt/family on CTA results    []   2114 Dr Vang Carry accepts to [facility] neurotele bed. []      ED Course User Index  [] Neema Thomas MD           9:06 PM    Presents with acute left-sided weakness which resolved prior to arrival.  He had some sensory deficits noted. Patient was seen by teleneurology recommend admission for TIA aspirin. CTA revealed no large vessel occlusion. CT did revealed an old stroke. Patient request [address] will transfer for neurology evaluation and further management. I have reviewed all pertinent and currently available diagnostic test results for this visit including, but not limited to, labs, xrays, and EKGs. I have reviewed all pertinent and currently available medical records. My plan of care and further evaluation and/or disposition is based on these results, as well as the initial, and subsequent, history and physical exam, as well as any additional complaints during the visit. Prince Rick MD        Procedures      Critical Care Time:   CRITICAL CARE NOTE :    9:07 PM      IMPENDING DETERIORATION -CNS  ASSOCIATED RISK FACTORS - Metabolic changes and CNS Decompensation  MANAGEMENT- Bedside Assessment, Supervision of Care and Transfer  INTERPRETATION -  Xrays, CT Scan, ECG, Blood Pressure and labs  INTERVENTIONS - Neurologic interventions  and Metobolic interventions  CASE REVIEW - Hospitalist/Intensivist, Medical Sub-Specialist, Nursing and Family  TREATMENT RESPONSE -Improved, Stable and Resolved  PERFORMED BY - Self    NOTES   :  I have spent 45 minutes of critical care time involved in lab review, consultations with specialist, family decision- making, bedside attention and documentation. This time excludes time spent in any separate billed procedures. During this entire length of time I was immediately available to the patient .     Bird Landis Jana Snow MD      Disposition:    Transferred to Another Facility        Diagnosis     Clinical Impression:   1. Acute left-sided weakness    2. TIA (transient ischemic attack)        Attestations: Suzi Mccrary MD    Please note that this dictation was completed with [email or web address], the computer voice recognition software. Quite often unanticipated grammatical, syntax, homophones, and other interpretive errors are inadvertently transcribed by the computer software. Please disregard these errors. Please excuse any errors that have escaped final proofreading. Thank you.

## 2021-06-26 ENCOUNTER — HOSPITAL ENCOUNTER (INPATIENT)
Age: 86
LOS: 2 days | Discharge: HOME HEALTH | DRG: 066 | End: 2021-06-28
Attending: FAMILY MEDICINE | Admitting: FAMILY MEDICINE
Payer: MEDICARE

## 2021-06-26 ENCOUNTER — APPOINTMENT (OUTPATIENT)
Dept: MRI IMAGING | Age: 86
DRG: 066 | End: 2021-06-26
Attending: FAMILY MEDICINE
Payer: MEDICARE

## 2021-06-26 VITALS
TEMPERATURE: 97.7 F | SYSTOLIC BLOOD PRESSURE: 148 MMHG | HEART RATE: 58 BPM | DIASTOLIC BLOOD PRESSURE: 76 MMHG | WEIGHT: 161.9 LBS | OXYGEN SATURATION: 97 % | RESPIRATION RATE: 16 BRPM | BODY MASS INDEX: 23.98 KG/M2 | HEIGHT: 69 IN

## 2021-06-26 DIAGNOSIS — I63.9 CEREBROVASCULAR ACCIDENT (CVA), UNSPECIFIED MECHANISM (HCC): ICD-10-CM

## 2021-06-26 DIAGNOSIS — E78.5 HYPERLIPIDEMIA, UNSPECIFIED HYPERLIPIDEMIA TYPE: ICD-10-CM

## 2021-06-26 DIAGNOSIS — Z85.818 HISTORY OF CANCER TONSIL: ICD-10-CM

## 2021-06-26 DIAGNOSIS — Z87.891 HISTORY OF SMOKING: ICD-10-CM

## 2021-06-26 PROCEDURE — 65660000000 HC RM CCU STEPDOWN

## 2021-06-26 PROCEDURE — 74011250637 HC RX REV CODE- 250/637: Performed by: NURSE PRACTITIONER

## 2021-06-26 PROCEDURE — 99223 1ST HOSP IP/OBS HIGH 75: CPT | Performed by: PSYCHIATRY & NEUROLOGY

## 2021-06-26 PROCEDURE — 97530 THERAPEUTIC ACTIVITIES: CPT

## 2021-06-26 PROCEDURE — 70551 MRI BRAIN STEM W/O DYE: CPT

## 2021-06-26 PROCEDURE — 97161 PT EVAL LOW COMPLEX 20 MIN: CPT

## 2021-06-26 PROCEDURE — 97116 GAIT TRAINING THERAPY: CPT

## 2021-06-26 RX ORDER — ACETAMINOPHEN 325 MG/1
650 TABLET ORAL
Status: DISCONTINUED | OUTPATIENT
Start: 2021-06-26 | End: 2021-06-28 | Stop reason: HOSPADM

## 2021-06-26 RX ORDER — ATORVASTATIN CALCIUM 40 MG/1
80 TABLET, FILM COATED ORAL
Status: DISCONTINUED | OUTPATIENT
Start: 2021-06-26 | End: 2021-06-28 | Stop reason: HOSPADM

## 2021-06-26 RX ORDER — ZOLPIDEM TARTRATE 5 MG/1
10 TABLET ORAL
Status: DISCONTINUED | OUTPATIENT
Start: 2021-06-26 | End: 2021-06-27

## 2021-06-26 RX ORDER — ONDANSETRON 4 MG/1
4 TABLET, ORALLY DISINTEGRATING ORAL
Status: DISCONTINUED | OUTPATIENT
Start: 2021-06-26 | End: 2021-06-28 | Stop reason: HOSPADM

## 2021-06-26 RX ORDER — ATORVASTATIN CALCIUM 40 MG/1
40 TABLET, FILM COATED ORAL
Status: DISCONTINUED | OUTPATIENT
Start: 2021-06-26 | End: 2021-06-26

## 2021-06-26 RX ORDER — ACETAMINOPHEN 650 MG/1
650 SUPPOSITORY RECTAL
Status: DISCONTINUED | OUTPATIENT
Start: 2021-06-26 | End: 2021-06-28 | Stop reason: HOSPADM

## 2021-06-26 RX ORDER — ADHESIVE BANDAGE
30 BANDAGE TOPICAL DAILY PRN
Status: DISCONTINUED | OUTPATIENT
Start: 2021-06-26 | End: 2021-06-28 | Stop reason: HOSPADM

## 2021-06-26 RX ORDER — GUAIFENESIN 100 MG/5ML
81 LIQUID (ML) ORAL DAILY
Status: DISCONTINUED | OUTPATIENT
Start: 2021-06-26 | End: 2021-06-28 | Stop reason: HOSPADM

## 2021-06-26 RX ADMIN — ASPIRIN 81 MG CHEWABLE TABLET 81 MG: 81 TABLET CHEWABLE at 10:00

## 2021-06-26 RX ADMIN — ZOLPIDEM TARTRATE 10 MG: 5 TABLET ORAL at 20:40

## 2021-06-26 NOTE — PROGRESS NOTES
2102 - Dr. Gregorio Hyde advised to arrange \"WILL CALL\" ALS (no lights and siren) transportation from [facility] to [address] [place] [address]. Arranged ALS transport w/[facility] -  advised to bring appropiate equipment (cardiac monitor, IV pump) - will call [facility] back and upgrade when accepting physician.

## 2021-06-26 NOTE — PROGRESS NOTES
Bedside shift change report given to Katie Khalil [address] (oncoming nurse) by Joanna Cassidy RN (offgoing nurse). Report included the following information SBAR, Kardex, ED Summary, Cardiac Rhythm NSR and Alarm Parameters .

## 2021-06-26 NOTE — ED NOTES
TRANSFER - OUT REPORT:    Verbal report given to Duglas Burns RN on Narendra Lujan III  being transferred to [facility] for urgent transfer       Report consisted of patients Situation, Background, Assessment and   Recommendations(SBAR). Information from the following report(s) ED Summary, MAR, Recent Results and Cardiac Rhythm (sinus sommer), Pain Lvl and Fall Risk was reviewed with the receiving nurse. Lines:   Peripheral IV 06/25/21 Left Antecubital (Active)        Opportunity for questions and clarification was provided.       Patient transported with:   Monitor

## 2021-06-26 NOTE — PROGRESS NOTES
Dr. Dasiy Diaz advised to arrange Dexter Tapia" to full transport to [facility] ED - [facility] called Suze Levy) and given ETA of 0131

## 2021-06-26 NOTE — PROGRESS NOTES
Problem: Mobility Impaired (Adult and Pediatric)  Goal: *Acute Goals and Plan of Care (Insert Text)  Description:   FUNCTIONAL STATUS PRIOR TO ADMISSION: Patient was independent and active without use of DME.    HOME SUPPORT PRIOR TO ADMISSION: The patient lived with his supportive wife and has local support from children. He did not require assist.    Physical Therapy Goals  Initiated 6/26/2021  1. Patient will move from supine to sit and sit to supine  in bed with independence within 7 day(s). 2.  Patient will transfer from bed to chair and chair to bed with independence using the least restrictive device within 7 day(s). 3.  Patient will perform sit to stand with modified independence within 7 day(s). 4.  Patient will ambulate with independence for 300 feet with the least restrictive device within 7 day(s). 5.  Patient will ascend/descend 4 stairs with 1 handrail(s) with modified independence within 7 day(s). 6.  Patient will improve Kelley Balance score by 7 points within 7 days. 6/26/2021 1432 by Darshan Diaz, PT, DPT  Outcome: Progressing Towards Goal  PHYSICAL THERAPY EVALUATION- NEURO POPULATION  Patient: Miki Alexandre (8 y.o. male)  Date: 6/26/2021  Primary Diagnosis: Stroke (cerebrum) [facility]) [I63.9]        Precautions:          ASSESSMENT  Based on the objective data described below, the patient presents with good overall mobility but mild high level balance deficits and left UE dysmetria following admission for a right temporal CVA. Patient was independent and active at baseline and endorses no residual deficits following admission for left numbness. The patient's daughter was present during evaluation and reports a mild increase in patient difficulty with word finding. Gait training x200' with a narrow base of support and 2 small LOBs with self correction while dual tasking. Patient scored well on the KELLEY for his age with moderate fall risk.  Will follow up x1 Monday for stair training and further gait assessment if he remains hospitalized. The patient has good home support. He either down plays any difficulty or reports NO residual deficits. Encouraged to ease back into his routine and to watch for any difficulty with balance. No discharge recommendations but he may benefit from outpatient PT for balance IF there are concerns following discharge. Discussed with the patient, his wife, and daughter. Current Level of Function Impacting Discharge (mobility/balance):  high level balance deficits when dual tasking    Functional Outcome Measure: The patient scored Total: 40/56 on the Begum Balance Assessment which is indicative of moderate fall risk. Patient will benefit from skilled therapy intervention to address the above noted impairments. PLAN :  Recommendations and Planned Interventions: bed mobility training, transfer training, gait training, therapeutic exercises, and therapeutic activities      Frequency/Duration: Patient will be followed by physical therapy:  3 times a week to address goals. Recommendation for discharge: (in order for the patient to meet his/her long term goals)  No skilled physical therapy/ follow up rehabilitation needs identified at this time. IF patient discharges home will need the following DME: none         SUBJECTIVE:   Patient stated I have had trouble with memory since I had treatment for cancer. Aleksander Silvestre    OBJECTIVE DATA SUMMARY:   HISTORY:    Past Medical History:   Diagnosis Date    GERD (gastroesophageal reflux disease)     Raynaud disease     Tonsil cancer ([facility] [address]      Past Surgical History:   Procedure Laterality Date    HX GI      colonscopy    HX HEENT      HX OTHER SURGICAL      Bilateral Inguinal Hernias    HX TONSILLECTOMY      August 2016    HX UROLOGICAL      vasectomy    HX VASCULAR ACCESS         Personal factors and/or comorbidities impacting plan of care:     Home Situation  Home Environment: Private residence  # Steps to Enter: 4  Rails to [employer]: Yes  One/Two Story Residence: Two story, live on 1st floor  Living Alone: No  Support Systems: Family member(s)  Patient Expects to be Discharged to[de-identified] House  Current DME Used/Available at Home: Blood pressure cuff    EXAMINATION/PRESENTATION/DECISION MAKING:   Critical Behavior:  Neurologic State: Alert  Orientation Level: Appropriate for age, Oriented X4  Cognition: Follows commands     Hearing: Auditory  Auditory Impairment: None  Skin:    Edema:   Range Of Motion:  AROM: Within functional limits                       Strength:    Strength: Within functional limits                    Tone & Sensation:                  Sensation: Intact               Coordination:  Coordination: Generally decreased, functional (mild dysmetria, left finger to nose)  Vision:      Functional Mobility:  Bed Mobility:  Rolling: Independent  Supine to Sit: Independent        Transfers:  Sit to Stand: Modified independent                          Balance:   Sitting: Intact  Standing: Impaired  Standing - Static: Good  Standing - Dynamic : Fair;Occasional  Ambulation/Gait Training:  Distance (ft): 200 Feet (ft)  Assistive Device: Gait belt  Ambulation - Level of Assistance: Supervision     Gait Description (WDL): Exceptions to WDL  Gait Abnormalities: Decreased step clearance              Speed/Soledad: Accelerated                        Stairs:               Therapeutic Exercises:       Functional Measure  Begum Balance Test:    Sitting to Standin  Standing Unsupported: 3  Sitting with Back Unsupported: 4  Standing to Sitting: 3  Transfers: 3  Standing Unsupported with Eyes Closed: 3  Standing Unsupported with Feet Together: 3  Reach Forward with Outstretched Arm: 3   Object: 4  Turn to Look Over Shoulders: 3  Turn 360 Degrees: 2  Alternate Foot on Step/Stool: 3  Standing Unsupported One Foot in Front: 1  Stand on One Leg: 3  Total: 40/56         56=Maximum possible score;   0-20=High fall risk  21-40=Moderate fall risk   41-56=Low fall risk        Physical Therapy Evaluation Charge Determination   History Examination Presentation Decision-Making   MEDIUM  Complexity : 1-2 comorbidities / personal factors will impact the outcome/ POC  LOW Complexity : 1-2 Standardized tests and measures addressing body structure, function, activity limitation and / or participation in recreation  LOW Complexity : Stable, uncomplicated  LOW Complexity : FOTO score of       Based on the above components, the patient evaluation is determined to be of the following complexity level: LOW     Pain Rating:      Activity Tolerance:   Good      After treatment patient left in no apparent distress:   Sitting in chair and Call bell within reach    COMMUNICATION/EDUCATION:   The patients plan of care was discussed with: Registered nurse. Patient and/or family was verbally educated on the BE FAST acronym for signs/symptoms of CVA and TIA. BE FAST was written on patient's communication board  for visual education and reinforcement. All questions answered with patient indicating good understanding. Fall prevention education was provided and the patient/caregiver indicated understanding., Patient/family have participated as able in goal setting and plan of care. , and Patient/family agree to work toward stated goals and plan of care.     Thank you for this referral.  Branden Bella, PT, DPT   Time Calculation: 47 mins

## 2021-06-26 NOTE — H&P
[facility] Adult  Hospitalist Group  History and Physical    Primary Care Provider: Mary Garcia  Date of Service:  6/26/2021    Subjective:     Carole Kemp is a 80 y.o. male with a past medical history of throat ca, GERD, and Raynauds disease who present [facility] ED with complaints of left arm numbness and tingling that started one hour prior to arrival. Wife reported facial droop. Symptoms started around 6:35pm and  resolved within 15 minutes. CT head: Interval development of ischemic infarct in the anterior limb of the right internal capsule. Equivocal change in the right MCA, not confirmed on the second axial series. Patient was transferred to [facility] for further stroke workup   Upon examination he is AAOx4. States he was about to have his nightly drink and as he was bring the glass to his mouth his left arm started having numbness and tingling. He wait a few minutes and when they didn't resolve he called his wife to call 911. He also tried taking his blood pressure but couldn't remember at the time how to put the cuff on and work the machine. Normally has no problem,  States symptoms have resolved and he is feeling his \"normal self\". Strength equal and strong in all 4 extremities. Denies any syncope, dizziness, shortness of breath, chest pain or tightness, nausea, vomiting or diarrhea. Review of Systems:    A comprehensive review of systems was negative except for that written in the History of Present Illness. Past Medical History:   Diagnosis Date    GERD (gastroesophageal reflux disease)     Raynaud disease     Tonsil cancer ([facility] [address]       Past Surgical History:   Procedure Laterality Date    HX GI      colonscopy    HX HEENT      HX OTHER SURGICAL      Bilateral Inguinal Hernias    HX TONSILLECTOMY      August 2016    HX UROLOGICAL      vasectomy    HX VASCULAR ACCESS       Prior to Admission medications    Medication Sig Start Date End Date Taking?  Authorizing Provider famotidine (PEPCID) 20 mg tablet Take 1 Tab by mouth. 1/17/12  Yes Provider, Historical   ibuprofen (ADVIL) 200 mg tablet 200 mg. 5/30/14  Yes Provider, Historical   polyethylene glycol (MIRALAX) 17 gram packet Take 1 Packet by mouth daily. 12/1/16  Yes Feliz Longoria MD   diphenhydrAMINE-acetaminophen  mg tab Take 1 Tab by mouth. 4/8/10   Provider, Historical   meclizine (ANTIVERT) 25 mg tablet Take 1 Tab by mouth three (3) times daily as needed for Dizziness. 6/30/17   Brian Joy MD   artificial saliva (MOUTH KOTE) spra Take 1 Spray by mouth as needed for Other. 12/1/16   Feliz Longoria MD   aluminum-magnesium hydroxide 200-200 mg/5 mL susp 30 mL, diphenhydrAMINE 12.5 mg/5 mL elix 75 mg, lidocaine 2 % soln 30 mL oral suspension (compounded) Take 5 mL by mouth Before breakfast, lunch, dinner and at bedtime. 12/1/16   Feliz Longoria MD   zolpidem (AMBIEN) 10 mg tablet Take 10 mg by mouth nightly as needed for Sleep. Provider, Historical     Allergies   Allergen Reactions    Pentazocine Other (comments)     Patient unsure of reaction    Talwin [Pentazocine Lactate] Unknown (comments)     Cant remember, happened 36 years ago      Family History   Problem Relation Age of Onset    Dementia Mother     Parkinson's Disease Father         SOCIAL HISTORY:  Patient resides at Home. Patient ambulates with Independent.    Smoking history: Quit 5 years ago  Alcohol history: 1-2 glasses of scotch per night   Drug history: denies         Objective:       Physical Exam:   Visit Vitals  /71 (BP 1 Location: Left upper arm, BP Patient Position: At rest)   Pulse (!) 56   Temp 97.2 °F (36.2 °C)   Resp 16   SpO2 95%     General appearance: alert, cooperative, no distress, appears stated age  Lungs: clear to auscultation bilaterally  Heart: Sinus sommer, S1, S2 normal, no murmur, click, rub or gallop  Extremities: extremities normal, atraumatic, no cyanosis or edema  Pulses: 2+ and symmetric  Skin: Skin color, texture, turgor normal. No rashes or lesions  Neurologic: Grossly normal, No facial droop, bilateral  equal and strong, No drift noted. Cap refill: Brisk, less than 3 seconds  Pulses: 2+, symmetric in all extremities    ECG:  sinus bradycardia     Data Review: All diagnostic labs and studies have been reviewed. Chest x-ray No acute cardiopulmonary process seen     Assessment:     Active Problems:    Stroke (cerebrum) ([facility] [address] (6/26/2021)        Plan:     CVA    - Admit to neuro tele  - Code Neuro CT head:Interval development of ischemic infarct in the anterior limb of the right internal capsule. Equivocal change in the right MCA, not confirmed on the second axial series.   - Neuro checks   - MRI and ECHO pending   - Lipid panel and A1c pending   - Start asa and atorvastatin   - Neurology consult   - PT/OT consulted     Sinus Sarika Bianka   - Asymptomatic   - Continue telemetry     Insomnia   - Takes zolpidem 10mg at night     DVT prophylaxis: SCDs  Code Status: Full code   ALEX [facility] and emergency contact: Wife  Kyle Sera [phone]      FUNCTIONAL STATUS PRIOR TO HOSPITALIZATION (including history of recent falls): Independent     Signed By: Katalina Capone NP     June 26, 2021

## 2021-06-26 NOTE — ACP (ADVANCE CARE PLANNING)
Advance Care Planning     Advance Care Planning (ACP) Physician/NP/PA Conversation      Date of Conversation: 6/25/2021  Conducted with: Patient with Decision Making Capacity    Healthcare Decision Maker:     Click here to complete [address] including selection of the Healthcare Decision Maker Relationship (ie \"Primary\")      Care Preferences:    Hospitalization: \"If your health worsens and it becomes clear that your chance of recovery is unlikely, what would be your preference regarding hospitalization? \"  The patient would prefer hospitalization. Ventilation: \"If you were unable to breathe on your own and your chance of recovery was unlikely, what would be your preference about the use of a ventilator (breathing machine) if it was available to you? \"   The patient would desire the use of a ventilator. Resuscitation: \"In the event your heart stopped as a result of an underlying serious health condition, would you want attempts to be made to restart your heart, or would you prefer a natural death? \"   Yes, attempt to resuscitate. We discussed code status including full vs. Partial vs. DNR. Also discussed process of resuscitation in detail including CPR, defibrillation, intubation and emergency drugs. At this time patient states that he wants to remain a full code.  The only objection he has is \"dont want to be hooked to the machines for a month\"    Additional topics discussed: treatment goals    Conversation Outcomes / Follow-Up Plan:   ACP complete - no further action today  Reviewed DNR/DNI and patient elects Full Code (Attempt Resuscitation)     Length of Voluntary ACP Conversation in minutes:  16 minutes    Vinicio Valles NP

## 2021-06-26 NOTE — PROGRESS NOTES
Transition of Care Plan   RUR- Low 9%   DISPOSITION: Likely home with spouse when medically ready   F/U with PCP/Specialist     Transport: spouse, Nahed Paulino      Reason for Admission:  Left arm numbness                     RUR Score:   9%                  Plan for utilizing home health:      No prior [facility] services; patient stated he was \"probably not\" open to home health. If recommended, patient may change mind. CM to follow PT/OT evals. PCP: First and Last name:  Kim Charlton     Name of Practice:    Are you a current patient: Yes/No: Yes   Approximate date of last visit: 6/23   Can you participate in a virtual visit with your PCP:                     Current Advanced Directive/Advance Care Plan: Full Code      Healthcare Decision Maker: Ravinder Damian is patient's spouse, Griselda Cancino  Click here to [address] including selection of the Healthcare Decision Maker Relationship (ie \"Primary\")                             Transition of Care Plan:     CM met with patient at bedside to introduce self and role. Patient lives with his wife in 1.5 story home; 4 steps to enter. Patient is independent at baseline; does not use any DME to ambulate. No prior HH, SNF or IPR placement. CM verified demographics; patient uses pharm in [address], unable to recall name. CM to follow patient progress and assist as needed with IBETH plan. Care Management Interventions  PCP Verified by CM: Yes  Last Visit to PCP: 06/23/21  Palliative Care Criteria Met (RRAT>21 & CHF Dx)?: No  Mode of Transport at Discharge:  Other (see comment) (spouse, Nahed Paulino)  Transition of Care Consult (CM Consult): Discharge Planning  MyChart Signup: No  Discharge Durable Medical Equipment: No  Health Maintenance Reviewed: Yes  Physical Therapy Consult: Yes  Occupational Therapy Consult: Yes  Speech Therapy Consult: Yes  Current Support Network: Lives with Spouse  Confirm Follow Up Transport: Family  Discharge Location  Discharge Placement: [facility].

## 2021-06-26 NOTE — PROGRESS NOTES
Problem: Discharge Planning  Goal: *Discharge to safe environment  6/26/2021 1149 by Taisha Barahona RN  Outcome: Progressing Towards Goal  6/26/2021 1148 by Taisha Barahona RN  Outcome: Progressing Towards Goal     Problem: Falls - Risk of  Goal: *Absence of Falls  Description: Document Dorothy Ramos Fall Risk and appropriate interventions in the flowsheet.   Outcome: Progressing Towards Goal  Note: Fall Risk Interventions:            Medication Interventions: Bed/chair exit alarm, Patient to call before getting OOB                   Problem: Patient Education: Go to Patient Education Activity  Goal: Patient/Family Education  Outcome: Progressing Towards Goal

## 2021-06-26 NOTE — PROGRESS NOTES
Bedside shift change report given to [address] (oncoming nurse) by Ruth Marie RN (offgoing nurse). Report included the following information SBAR, Kardex, Intake/Output, MAR, Cardiac Rhythm Sinus Bradycardia and Dual Neuro Assessment.

## 2021-06-27 ENCOUNTER — APPOINTMENT (OUTPATIENT)
Dept: NON INVASIVE DIAGNOSTICS | Age: 86
DRG: 066 | End: 2021-06-27
Attending: NURSE PRACTITIONER
Payer: MEDICARE

## 2021-06-27 LAB
ANION GAP SERPL CALC-SCNC: 6 MMOL/L (ref 5–15)
AV R PG: 37.09 MMHG
BUN SERPL-MCNC: 16 MG/DL (ref 6–20)
BUN/CREAT SERPL: 21 (ref 12–20)
CALCIUM SERPL-MCNC: 9 MG/DL (ref 8.5–10.1)
CHLORIDE SERPL-SCNC: 108 MMOL/L (ref 97–108)
CHOLEST SERPL-MCNC: 177 MG/DL
CO2 SERPL-SCNC: 24 MMOL/L (ref 21–32)
CREAT SERPL-MCNC: 0.75 MG/DL (ref 0.7–1.3)
ECHO AO ROOT DIAM: 3.49 CM
ECHO AR MAX VEL PISA: 294.68 CM/S
ECHO AV PEAK GRADIENT: 5.21 MMHG
ECHO AV PEAK VELOCITY: 114.1 CM/S
ECHO AV REGURGITANT PHT: 2656.32 MS
ECHO LA AREA 4C: 12.39 CM2
ECHO LA MAJOR AXIS: 2.98 CM
ECHO LA MINOR AXIS: 1.59 CM
ECHO LA VOL 2C: 25.78 ML (ref 18–58)
ECHO LA VOL 4C: 28.53 ML (ref 18–58)
ECHO LA VOL BP: 30.61 ML (ref 18–58)
ECHO LA VOL/BSA BIPLANE: 16.28 ML/M2 (ref 16–28)
ECHO LA VOLUME INDEX A2C: 13.71 ML/M2 (ref 16–28)
ECHO LA VOLUME INDEX A4C: 15.18 ML/M2 (ref 16–28)
ECHO LV E' LATERAL VELOCITY: 6.82 CM/S
ECHO LV E' SEPTAL VELOCITY: 4.23 CM/S
ECHO LV INTERNAL DIMENSION DIASTOLIC: 3.91 CM (ref 4.2–5.9)
ECHO LV INTERNAL DIMENSION SYSTOLIC: 2.45 CM
ECHO LV IVSD: 1.2 CM (ref 0.6–1)
ECHO LV MASS 2D: 173.1 G (ref 88–224)
ECHO LV MASS INDEX 2D: 92.1 G/M2 (ref 49–115)
ECHO LV POSTERIOR WALL DIASTOLIC: 1.33 CM (ref 0.6–1)
ECHO LVOT PEAK GRADIENT: 4.9 MMHG
ECHO LVOT PEAK VELOCITY: 110.72 CM/S
ECHO MV A VELOCITY: 87.1 CM/S
ECHO MV AREA PHT: 2.49 CM2
ECHO MV E DECELERATION TIME (DT): 304.49 MS
ECHO MV E VELOCITY: 58.04 CM/S
ECHO MV E/A RATIO: 0.67
ECHO MV E/E' LATERAL: 8.51
ECHO MV E/E' RATIO (AVERAGED): 11.12
ECHO MV E/E' SEPTAL: 13.72
ECHO MV PRESSURE HALF TIME (PHT): 88.3 MS
ECHO PV MAX VELOCITY: 92.05 CM/S
ECHO PV PEAK INSTANTANEOUS GRADIENT SYSTOLIC: 3.39 MMHG
ECHO RV TAPSE: 1.79 CM (ref 1.5–2)
ERYTHROCYTE [DISTWIDTH] IN BLOOD BY AUTOMATED COUNT: 13.7 % (ref 11.5–14.5)
EST. AVERAGE GLUCOSE BLD GHB EST-MCNC: 105 MG/DL
GLUCOSE SERPL-MCNC: 102 MG/DL (ref 65–100)
HBA1C MFR BLD: 5.3 % (ref 4–5.6)
HCT VFR BLD AUTO: 43.9 % (ref 36.6–50.3)
HDLC SERPL-MCNC: 48 MG/DL
HDLC SERPL: 3.7 {RATIO} (ref 0–5)
HGB BLD-MCNC: 14.9 G/DL (ref 12.1–17)
LDLC SERPL CALC-MCNC: 110 MG/DL (ref 0–100)
MCH RBC QN AUTO: 32 PG (ref 26–34)
MCHC RBC AUTO-ENTMCNC: 33.9 G/DL (ref 30–36.5)
MCV RBC AUTO: 94.4 FL (ref 80–99)
NRBC # BLD: 0 K/UL (ref 0–0.01)
NRBC BLD-RTO: 0 PER 100 WBC
PLATELET # BLD AUTO: 237 K/UL (ref 150–400)
PMV BLD AUTO: 9.8 FL (ref 8.9–12.9)
POTASSIUM SERPL-SCNC: 4 MMOL/L (ref 3.5–5.1)
RBC # BLD AUTO: 4.65 M/UL (ref 4.1–5.7)
SODIUM SERPL-SCNC: 138 MMOL/L (ref 136–145)
TRIGL SERPL-MCNC: 95 MG/DL (ref ?–150)
VLDLC SERPL CALC-MCNC: 19 MG/DL
WBC # BLD AUTO: 7.9 K/UL (ref 4.1–11.1)

## 2021-06-27 PROCEDURE — 80061 LIPID PANEL: CPT

## 2021-06-27 PROCEDURE — 97166 OT EVAL MOD COMPLEX 45 MIN: CPT

## 2021-06-27 PROCEDURE — 97535 SELF CARE MNGMENT TRAINING: CPT

## 2021-06-27 PROCEDURE — 99233 SBSQ HOSP IP/OBS HIGH 50: CPT | Performed by: PSYCHIATRY & NEUROLOGY

## 2021-06-27 PROCEDURE — 93306 TTE W/DOPPLER COMPLETE: CPT

## 2021-06-27 PROCEDURE — 65660000000 HC RM CCU STEPDOWN

## 2021-06-27 PROCEDURE — 36415 COLL VENOUS BLD VENIPUNCTURE: CPT

## 2021-06-27 PROCEDURE — 74011250637 HC RX REV CODE- 250/637: Performed by: NURSE PRACTITIONER

## 2021-06-27 PROCEDURE — 83036 HEMOGLOBIN GLYCOSYLATED A1C: CPT

## 2021-06-27 PROCEDURE — 97112 NEUROMUSCULAR REEDUCATION: CPT

## 2021-06-27 PROCEDURE — 85027 COMPLETE CBC AUTOMATED: CPT

## 2021-06-27 PROCEDURE — 80048 BASIC METABOLIC PNL TOTAL CA: CPT

## 2021-06-27 RX ORDER — ZOLPIDEM TARTRATE 5 MG/1
5 TABLET ORAL
Status: DISCONTINUED | OUTPATIENT
Start: 2021-06-27 | End: 2021-06-28 | Stop reason: HOSPADM

## 2021-06-27 RX ADMIN — ACETAMINOPHEN 650 MG: 325 TABLET ORAL at 04:31

## 2021-06-27 RX ADMIN — ACETAMINOPHEN 650 MG: 325 TABLET ORAL at 14:21

## 2021-06-27 RX ADMIN — ATORVASTATIN CALCIUM 80 MG: 40 TABLET, FILM COATED ORAL at 04:32

## 2021-06-27 RX ADMIN — ATORVASTATIN CALCIUM 80 MG: 40 TABLET, FILM COATED ORAL at 21:41

## 2021-06-27 RX ADMIN — ASPIRIN 81 MG CHEWABLE TABLET 81 MG: 81 TABLET CHEWABLE at 09:00

## 2021-06-27 NOTE — PROGRESS NOTES
Problem: Discharge Planning  Goal: *Discharge to safe environment  Outcome: Progressing Towards Goal     Problem: Falls - Risk of  Goal: *Absence of Falls  Description: Document Shonda Long Fall Risk and appropriate interventions in the flowsheet.   Outcome: Progressing Towards Goal  Note: Fall Risk Interventions:            Medication Interventions: Patient to call before getting OOB                   Problem: Patient Education: Go to Patient Education Activity  Goal: Patient/Family Education  Outcome: Progressing Towards Goal     Problem: Patient Education: Go to Patient Education Activity  Goal: Patient/Family Education  Outcome: Progressing Towards Goal     Problem: Patient Education: Go to Patient Education Activity  Goal: Patient/Family Education  Outcome: Progressing Towards Goal     Problem: TIA/CVA Stroke: 0-24 hours  Goal: Off Pathway (Use only if patient is Off Pathway)  Outcome: Progressing Towards Goal  Goal: Activity/Safety  Outcome: Progressing Towards Goal  Goal: Consults, if ordered  Outcome: Progressing Towards Goal  Goal: Diagnostic Test/Procedures  Outcome: Progressing Towards Goal  Goal: Nutrition/Diet  Outcome: Progressing Towards Goal  Goal: Discharge Planning  Outcome: Progressing Towards Goal  Goal: Medications  Outcome: Progressing Towards Goal  Goal: Respiratory  Outcome: Progressing Towards Goal  Goal: Treatments/Interventions/Procedures  Outcome: Progressing Towards Goal  Goal: Minimize risk of bleeding post-thrombolytic infusion  Outcome: Progressing Towards Goal  Goal: Monitor for complications post-thrombolytic infusion  Outcome: Progressing Towards Goal  Goal: Psychosocial  Outcome: Progressing Towards Goal  Goal: *Hemodynamically stable  Outcome: Progressing Towards Goal  Goal: *Neurologically stable  Description: Absence of additional neurological deficits    Outcome: Progressing Towards Goal  Goal: *Verbalizes anxiety and depression are reduced or absent  Outcome: Progressing Towards Goal  Goal: *Absence of Signs of Aspiration on Current Diet  Outcome: Progressing Towards Goal  Goal: *Absence of deep venous thrombosis signs and symptoms(Stroke Metric)  Outcome: Progressing Towards Goal  Goal: *Ability to perform ADLs and demonstrates progressive mobility and function  Outcome: Progressing Towards Goal  Goal: *Stroke education started(Stroke Metric)  Outcome: Progressing Towards Goal  Goal: *Dysphagia screen performed(Stroke Metric)  Outcome: Progressing Towards Goal  Goal: *Rehab consulted(Stroke Metric)  Outcome: Progressing Towards Goal     Problem: TIA/CVA Stroke: Day 2 Until Discharge  Goal: Off Pathway (Use only if patient is Off Pathway)  Outcome: Progressing Towards Goal  Goal: Activity/Safety  Outcome: Progressing Towards Goal  Goal: Diagnostic Test/Procedures  Outcome: Progressing Towards Goal  Goal: Nutrition/Diet  Outcome: Progressing Towards Goal  Goal: Discharge Planning  Outcome: Progressing Towards Goal  Goal: Medications  Outcome: Progressing Towards Goal  Goal: Respiratory  Outcome: Progressing Towards Goal  Goal: Treatments/Interventions/Procedures  Outcome: Progressing Towards Goal  Goal: Psychosocial  Outcome: Progressing Towards Goal  Goal: *Verbalizes anxiety and depression are reduced or absent  Outcome: Progressing Towards Goal  Goal: *Absence of aspiration  Outcome: Progressing Towards Goal  Goal: *Absence of deep venous thrombosis signs and symptoms(Stroke Metric)  Outcome: Progressing Towards Goal  Goal: *Optimal pain control at patient's stated goal  Outcome: Progressing Towards Goal  Goal: *Tolerating diet  Outcome: Progressing Towards Goal  Goal: *Ability to perform ADLs and demonstrates progressive mobility and function  Outcome: Progressing Towards Goal  Goal: *Stroke education continued(Stroke Metric)  Outcome: Progressing Towards Goal     Problem: Ischemic Stroke: Discharge Outcomes  Goal: *Verbalizes anxiety and depression are reduced or absent  Outcome: Progressing Towards Goal  Goal: *Verbalize understanding of risk factor modification(Stroke Metric)  Outcome: Progressing Towards Goal  Goal: *Hemodynamically stable  Outcome: Progressing Towards Goal  Goal: *Absence of aspiration pneumonia  Outcome: Progressing Towards Goal  Goal: *Aware of needed dietary changes  Outcome: Progressing Towards Goal  Goal: *Verbalize understanding of prescribed medications including anti-coagulants, anti-lipid, and/or anti-platelets(Stroke Metric)  Outcome: Progressing Towards Goal  Goal: *Tolerating diet  Outcome: Progressing Towards Goal  Goal: *Aware of follow-up diagnostics related to anticoagulants  Outcome: Progressing Towards Goal  Goal: *Ability to perform ADLs and demonstrates progressive mobility and function  Outcome: Progressing Towards Goal  Goal: *Absence of DVT(Stroke Metric)  Outcome: Progressing Towards Goal  Goal: *Absence of aspiration  Outcome: Progressing Towards Goal  Goal: *Optimal pain control at patient's stated goal  Outcome: Progressing Towards Goal  Goal: *Home safety concerns addressed  Outcome: Progressing Towards Goal  Goal: *Describes available resources and support systems  Outcome: Progressing Towards Goal  Goal: *Verbalizes understanding of activation of EMS(911) for stroke symptoms(Stroke Metric)  Outcome: Progressing Towards Goal  Goal: *Understands and describes signs and symptoms to report to providers(Stroke Metric)  Outcome: Progressing Towards Goal  Goal: *Neurolgocially stable (absence of additional neurological deficits)  Outcome: Progressing Towards Goal  Goal: *Verbalizes importance of follow-up with primary care physician(Stroke Metric)  Outcome: Progressing Towards Goal  Goal: *Smoking cessation discussed,if applicable(Stroke Metric)  Outcome: Progressing Towards Goal  Goal: *Depression screening completed(Stroke Metric)  Outcome: Progressing Towards Goal

## 2021-06-27 NOTE — PROGRESS NOTES
Neurology Progress Note     NAME: Farhat Quintana III   :  1935   MRN:  [id number]   DATE:  2021    Assessment:     Active Problems:    Stroke (cerebrum) ([facility] [address] (2021)      Pt is an 79yo RH male admitted on 21 with complaints of left upper extremity and facial weakness, numbness, noted to have an unsteady gait by EMS, quickly back to baseline with MRI of the brain revealing a right medial temporal stroke. 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 , started Lipitor 80mg daily. HgbA1c 5.3. Echo with EF 60-65% and no PFO on bubble study. Given Ambien 10mg qhs last night for sleep. Suspect more off balance this morning due to medication. Remainder of exam is stable and non-focal.   Plan:   -Continue ASA 81mg daily  -Continue Lipitor 80mg daily  -Out pt sleep evaluation for BARB  -Decreased Ambien to 5mg qhs tonight if needed  -Anticipate D/c in AM  -F/u in neurology at next available. Subjective:    Pt reports that he is having a bad morning. He requested a sleep aid last night, then was woken up to take it. This morning he did not feel fully awake and felt a little off balance walking to the restroom.      Objective:   Chart reviewed since last seen    Current Facility-Administered Medications   Medication Dose Route Frequency    acetaminophen (TYLENOL) tablet 650 mg  650 mg Oral Q4H PRN    Or    acetaminophen (TYLENOL) solution 650 mg  650 mg Per NG tube Q4H PRN    Or    acetaminophen (TYLENOL) suppository 650 mg  650 mg Rectal Q4H PRN    aspirin chewable tablet 81 mg  81 mg Oral DAILY    magnesium hydroxide (MILK OF MAGNESIA) 400 mg/5 mL oral suspension 30 mL  30 mL Oral DAILY PRN    ondansetron (ZOFRAN ODT) tablet 4 mg  4 mg Oral Q8H PRN    atorvastatin (LIPITOR) tablet 80 mg  80 mg Oral QHS    zolpidem (AMBIEN) tablet 10 mg  10 mg Oral QHS PRN       Visit Vitals  BP (!) 146/79 (BP Patient Position: Supine)   Pulse 61   Temp 98.2 °F (36.8 °C)   Resp 17   Ht 5' 9\" (1.753 m)   Wt 160 lb 15 oz (73 kg)   SpO2 99%   BMI 23.77 kg/m²     Temp (24hrs), Av.2 °F (36.8 °C), Min:97.8 °F (36.6 °C), Max:98.7 °F (37.1 °C)      No intake/output data recorded. No intake/output data recorded. Physical Exam:  General: Well developed well nourished patient in no apparent distress. Cardiac: Regular rate and rhythm with no murmurs. Extremities: 2+ Radial pulses, no cyanosis or edema    Neurological Exam:  Mental Status: Oriented to time, place and person. Speech and language intact. Attention and fund of knowledge appropriate. Normal recent and remote memory. Cranial Nerves:   EOMI, no nystagmus, no ptosis. Facial movement is symmetric. Hearing is intact. Motor:  5/5 strength in upper and lower proximal and distal muscles. Normal bulk and tone. No PD.  No tremors   Reflexes:      Sensory:      Gait:     Cerebellar:           Lab Review   Recent Results (from the past 24 hour(s))   HEMOGLOBIN A1C WITH EAG    Collection Time: 21  5:02 AM   Result Value Ref Range    Hemoglobin A1c 5.3 4.0 - 5.6 %    Est. average glucose 105 mg/dL   LIPID PANEL    Collection Time: 21  5:03 AM   Result Value Ref Range    Cholesterol, total 177 <200 MG/DL    Triglyceride 95 <150 MG/DL    HDL Cholesterol 48 MG/DL    LDL, calculated 110 (H) 0 - 100 MG/DL    VLDL, calculated 19 MG/DL    CHOL/HDL Ratio 3.7 0.0 - 5.0     CBC W/O DIFF    Collection Time: 21  5:03 AM   Result Value Ref Range    WBC 7.9 4.1 - 11.1 K/uL    RBC 4.65 4.10 - 5.70 M/uL    HGB 14.9 12.1 - 17.0 g/dL    HCT 43.9 36.6 - 50.3 %    MCV 94.4 80.0 - 99.0 FL    MCH 32.0 26.0 - 34.0 PG    MCHC 33.9 30.0 - 36.5 g/dL    RDW 13.7 11.5 - 14.5 %    PLATELET 637 568 - 986 K/uL    MPV 9.8 8.9 - 12.9 FL    NRBC 0.0 0  WBC    ABSOLUTE NRBC 0.00 0.00 - 8.43 K/uL   METABOLIC PANEL, BASIC    Collection Time: 06/27/21  5:03 AM   Result Value Ref Range    Sodium 138 136 - 145 mmol/L    Potassium 4.0 3.5 - 5.1 mmol/L    Chloride 108 97 - 108 mmol/L    CO2 24 21 - 32 mmol/L    Anion gap 6 5 - 15 mmol/L    Glucose 102 (H) 65 - 100 mg/dL    BUN 16 6 - 20 MG/DL    Creatinine 0.75 0.70 - 1.30 MG/DL    BUN/Creatinine ratio 21 (H) 12 - 20      GFR est AA >60 >60 ml/min/1.73m2    GFR est non-AA >60 >60 ml/min/1.73m2    Calcium 9.0 8.5 - 10.1 MG/DL   ECHO ADULT COMPLETE    Collection Time: 06/27/21 12:43 PM   Result Value Ref Range    IVSd 1.20 (A) 0.60 - 1.00 cm    LVIDd 3.91 (A) 4.20 - 5.90 cm    LVIDs 2.45 cm    LVPWd 1.33 (A) 0.60 - 1.00 cm    LVOT Peak Gradient 4.90 mmHg    LVOT Peak Velocity 110.72 cm/s    Left Atrium Major Axis 2.98 cm    LA Volume 30.61 18.0 - 58.0 mL    LA Area 4C 12.39 cm2    LA Vol 2C 25.78 18.00 - 58.00 mL    LA Vol 4C 28.53 18.00 - 58.00 mL    AV R PG 37.09 mmHg    Aortic Regurgitant Pressure Half-time 2,656.32 ms    AR Max Valeriano 294.68 cm/s    AoV PG 5.21 mmHg    Aortic Valve Systolic Peak Velocity 837.46 cm/s    MV A Valeriano 87.10 cm/s    Mitral Valve E Wave Deceleration Time 304.49 ms    MV E Valeriano 58.04 cm/s    E/E' ratio (averaged) 11.12     E/E' lateral 8.51     E/E' septal 13.72     LV E' Lateral Velocity 6.82 cm/s    LV E' Septal Velocity 4.23 cm/s    Mitral Valve Pressure Half-time 88.30 ms    MVA (PHT) 2.49 cm2    Pulmonic Valve Systolic Peak Instantaneous Gradient 3.39 mmHg    Pulmonic Valve Max Velocity 92.05 cm/s    Tapse 1.79 1.50 - 2.00 cm    Ao Root D 3.49 cm    MV E/A 0.67     LV Mass .1 88.0 - 224.0 g    LV Mass AL Index 92.1 49.0 - 115.0 g/m2    Left Atrium Minor Axis 1.59 cm    LA Vol Index 16.28 16.00 - 28.00 ml/m2    LA Vol Index 13.71 16.00 - 28.00 ml/m2    LA Vol Index 15.18 16.00 - 28.00 ml/m2       Additional comments:  I have reviewed the patient's new clinical lab test results.   I have personally reviewed the patient's radiographs. MRI  MRI Results (most recent):  Results from [place] encounter on 06/26/21    MRI BRAIN WO CONT    Narrative  EXAM: MRI BRAIN WO CONT    INDICATION: eval cva    CONTRAST: None. TECHNIQUE:  Multiplanar multisequence acquisition without contrast of the brain. FINDINGS:  There is T2 hyperintensity and diffusion restriction in the right temporal lobe  medially compatible with acute infarct. There is no acute hemorrhage. There is no midline shift or mass effect. There are bilateral deep cerebral white matter T2 hyperintensities without  diffusion restriction most compatible with chronic microangiopathy. There is no hydrocephalus or extra-axial fluid collection. Impression  Acute infarct right temporal lobe medially. No bleed or shift. Chronic microangiopathy. CT Results (most recent):  Results from Hospital Encounter encounter on 06/25/21    CTA CODE NEURO HEAD AND NECK W CONT    Narrative  *PRELIMINARY REPORT*    Atherosclerosis of the aorta, common carotid arteries, and carotid bifurcations. No large vessel occlusion. Preliminary report was provided by Dr. Beltran Petty, the on-call radiologist, at 2102  hours    Final report to follow. *END PRELIMINARY REPORT*    CLINICAL HISTORY: Left-sided weakness    EXAMINATION:  CT ANGIOGRAPHY HEAD AND NECK    COMPARISON: None    TECHNIQUE:  Following the uneventful administration of iodinated contrast  material, axial CT angiography of the head and neck was performed. Coronal and  sagittal reconstructions were obtained. Manual postprocessing of images was  performed. 3-D  Sagittal maximal intensity projection images were obtained. 3-D  Coronal maximal intensity projections were obtained. CT dose reduction was  achieved through use of a standardized protocol tailored for this examination  and automatic exposure control for dose modulation.     FINDINGS:    CTA NECK:    Great vessels: Normal arch anatomy with the origins patent. Right subclavian artery: Patent    Left subclavian artery: Patent    Right common carotid artery: Patent    Left common carotid artery: Patent    Cervical right internal carotid artery: Mild calcified plaque with no  significant stenosis by NASCET criteria. Cervical left internal carotid artery: Mild calcific plaque with no significant  stenosis by NASCET criteria. Right vertebral artery: Patent    Left vertebral artery: Patent    The lung apices are clear. The thyroid is homogeneous. No cervical  lymphadenopathy. CTA HEAD:    Right cavernous internal carotid artery: Patent    Left cavernous internal carotid artery: Patent    Anterior cerebral arteries: Patent    Anterior communicating artery: Patent    Right middle cerebral artery: Patent    Left middle cerebral artery: Patent    Posterior communicating arteries: Dominant right posterior communicating artery. Diminutive left posterior communicating artery. Posterior cerebral arteries: Patent    Basilar artery: Patent    Distal vertebral arteries: Patent    No evidence for intracranial aneurysm or hemodynamically significant stenosis. Impression  No evidence of large vessel occlusion or hemodynamically significant carotid  stenosis. Care Plan discussed with:  Patient x   Family x   RN x   Care Manager    Consultant/Specialist:  x     Signed: Sonny Ramos MD

## 2021-06-27 NOTE — PROGRESS NOTES
Orders received, chart reviewed and patient received sitting EOB finishing lunch while transport arrived to take patient off the floor. Patient sit<>stand EOB and stretcher; functional mobility to stretcher with supervision increased time. Daughter present. Will f/u later today as able and patient available. Recommend with nursing, ADLs with supervision/setup, once Egress Test completed then OOB to chair 3x/day and toileting via functional mobility to and from bathroom. Thank you for completing as able in order to maintain patient strength, endurance and independence.    Faith Sy M.S., OTR/L

## 2021-06-27 NOTE — PROGRESS NOTES
Bedside and Verbal shift change report given to Raeann London (oncoming nurse) by Amber Swanson (offgoing nurse). Report included the following information SBAR, Kardex, ED Summary, Procedure Summary, MAR, Recent Results, Cardiac Rhythm NSR and Dual Neuro Assessment.

## 2021-06-27 NOTE — PROGRESS NOTES
Problem: Discharge Planning  Goal: *Discharge to safe environment  6/27/2021 0107 by Torri Lewis RN  Outcome: Progressing Towards Goal  6/27/2021 0106 by Torri Lewis RN  Outcome: Progressing Towards Goal     Problem: Falls - Risk of  Goal: *Absence of Falls  Description: Document Shonda Long Fall Risk and appropriate interventions in the flowsheet.   6/27/2021 0107 by Torri Lewis RN  Outcome: Progressing Towards Goal  Note: Fall Risk Interventions:            Medication Interventions: Bed/chair exit alarm, Patient to call before getting OOB                6/27/2021 0106 by Torri Lewis RN  Outcome: Progressing Towards Goal  Note: Fall Risk Interventions:            Medication Interventions: Bed/chair exit alarm, Patient to call before getting OOB                   Problem: Patient Education: Go to Patient Education Activity  Goal: Patient/Family Education  6/27/2021 0107 by Torri Lewis RN  Outcome: Progressing Towards Goal  6/27/2021 0106 by Torri Lewis RN  Outcome: Progressing Towards Goal     Problem: Patient Education: Go to Patient Education Activity  Goal: Patient/Family Education  6/27/2021 0107 by Torri Lewis RN  Outcome: Progressing Towards Goal  6/27/2021 0106 by Torri Lewis RN  Outcome: Progressing Towards Goal     Problem: Patient Education: Go to Patient Education Activity  Goal: Patient/Family Education  Outcome: Progressing Towards Goal     Problem: TIA/CVA Stroke: 0-24 hours  Goal: Off Pathway (Use only if patient is Off Pathway)  Outcome: Progressing Towards Goal  Goal: Activity/Safety  Outcome: Progressing Towards Goal  Goal: Consults, if ordered  Outcome: Progressing Towards Goal  Goal: Diagnostic Test/Procedures  Outcome: Progressing Towards Goal  Goal: Nutrition/Diet  Outcome: Progressing Towards Goal  Goal: Discharge Planning  Outcome: Progressing Towards Goal  Goal: Medications  Outcome: Progressing Towards Goal  Goal: Respiratory  Outcome: Progressing Towards Goal  Goal: Treatments/Interventions/Procedures  Outcome: Progressing Towards Goal  Goal: Minimize risk of bleeding post-thrombolytic infusion  Outcome: Progressing Towards Goal  Goal: Monitor for complications post-thrombolytic infusion  Outcome: Progressing Towards Goal  Goal: Psychosocial  Outcome: Progressing Towards Goal  Goal: *Hemodynamically stable  Outcome: Progressing Towards Goal  Goal: *Neurologically stable  Description: Absence of additional neurological deficits    Outcome: Progressing Towards Goal  Goal: *Verbalizes anxiety and depression are reduced or absent  Outcome: Progressing Towards Goal  Goal: *Absence of Signs of Aspiration on Current Diet  Outcome: Progressing Towards Goal  Goal: *Absence of deep venous thrombosis signs and symptoms(Stroke Metric)  Outcome: Progressing Towards Goal  Goal: *Ability to perform ADLs and demonstrates progressive mobility and function  Outcome: Progressing Towards Goal  Goal: *Stroke education started(Stroke Metric)  Outcome: Progressing Towards Goal  Goal: *Dysphagia screen performed(Stroke Metric)  Outcome: Progressing Towards Goal  Goal: *Rehab consulted(Stroke Metric)  Outcome: Progressing Towards Goal     Problem: TIA/CVA Stroke: Day 2 Until Discharge  Goal: Off Pathway (Use only if patient is Off Pathway)  Outcome: Progressing Towards Goal  Goal: Activity/Safety  Outcome: Progressing Towards Goal  Goal: Diagnostic Test/Procedures  Outcome: Progressing Towards Goal  Goal: Nutrition/Diet  Outcome: Progressing Towards Goal  Goal: Discharge Planning  Outcome: Progressing Towards Goal  Goal: Medications  Outcome: Progressing Towards Goal  Goal: Respiratory  Outcome: Progressing Towards Goal  Goal: Treatments/Interventions/Procedures  Outcome: Progressing Towards Goal  Goal: Psychosocial  Outcome: Progressing Towards Goal  Goal: *Verbalizes anxiety and depression are reduced or absent  Outcome: Progressing Towards Goal  Goal: *Absence of aspiration  Outcome: Progressing Towards Goal  Goal: *Absence of deep venous thrombosis signs and symptoms(Stroke Metric)  Outcome: Progressing Towards Goal  Goal: *Optimal pain control at patient's stated goal  Outcome: Progressing Towards Goal  Goal: *Tolerating diet  Outcome: Progressing Towards Goal  Goal: *Ability to perform ADLs and demonstrates progressive mobility and function  Outcome: Progressing Towards Goal  Goal: *Stroke education continued(Stroke Metric)  Outcome: Progressing Towards Goal     Problem: Ischemic Stroke: Discharge Outcomes  Goal: *Verbalizes anxiety and depression are reduced or absent  Outcome: Progressing Towards Goal  Goal: *Verbalize understanding of risk factor modification(Stroke Metric)  Outcome: Progressing Towards Goal  Goal: *Hemodynamically stable  Outcome: Progressing Towards Goal  Goal: *Absence of aspiration pneumonia  Outcome: Progressing Towards Goal  Goal: *Aware of needed dietary changes  Outcome: Progressing Towards Goal  Goal: *Verbalize understanding of prescribed medications including anti-coagulants, anti-lipid, and/or anti-platelets(Stroke Metric)  Outcome: Progressing Towards Goal  Goal: *Tolerating diet  Outcome: Progressing Towards Goal  Goal: *Aware of follow-up diagnostics related to anticoagulants  Outcome: Progressing Towards Goal  Goal: *Ability to perform ADLs and demonstrates progressive mobility and function  Outcome: Progressing Towards Goal  Goal: *Absence of DVT(Stroke Metric)  Outcome: Progressing Towards Goal  Goal: *Absence of aspiration  Outcome: Progressing Towards Goal  Goal: *Optimal pain control at patient's stated goal  Outcome: Progressing Towards Goal  Goal: *Home safety concerns addressed  Outcome: Progressing Towards Goal  Goal: *Describes available resources and support systems  Outcome: Progressing Towards Goal  Goal: *Verbalizes understanding of activation of EMS(911) for stroke symptoms(Stroke Metric)  Outcome: Progressing Towards Goal  Goal: *Understands and describes signs and symptoms to report to providers(Stroke Metric)  Outcome: Progressing Towards Goal  Goal: *Neurolgocially stable (absence of additional neurological deficits)  Outcome: Progressing Towards Goal  Goal: *Verbalizes importance of follow-up with primary care physician(Stroke Metric)  Outcome: Progressing Towards Goal  Goal: *Smoking cessation discussed,if applicable(Stroke Metric)  Outcome: Progressing Towards Goal  Goal: *Depression screening completed(Stroke Metric)  Outcome: Progressing Towards Goal

## 2021-06-27 NOTE — PROGRESS NOTES
Bedside shift change report given to [facility] RN(oncoming nurse) by Juan Luis Wolfe RN (offgoing nurse). Report included the following information SBAR, Kardex, ED Summary, Cardiac Rhythm sinus rhythm to sinus sommer and Alarm Parameters .

## 2021-06-27 NOTE — PROGRESS NOTES
Problem: Self Care Deficits Care Plan (Adult)  Goal: *Acute Goals and Plan of Care (Insert Text)  Description: FUNCTIONAL STATUS PRIOR TO ADMISSION: Patient was independent and active without use of DME. Cancer 5 years ago in which drove 2 hours each way from [place] (home) to [place]. \"All I have left is driving\". Patient cuts own grass with Nyasia Francois riding . Cancer left some memory defiicts. HOME SUPPORT: The patient lived with wife with whom is independent as well. Occupational Therapy Goals  Initiated 6/27/2021   1. Patient will perform standing ADL 5 mins without cues to scan L to locate ADL items with modified independence within 7 day(s). 2.  Patient will perform lower body dressing with modified independence with in 7 day(s). 3.  Patient will perform bathing with modified independence within 7 day(s). 4.  Patient will perform gathering ADL items high and low, left and right 4/4 with modified independence within 7 day(s). 5.  Patient will perform managing FM ADL containers/tasks without dropping with modified independence within 7 day(s). 6.  Patient will participate in upper extremity therapeutic exercise/activities L UE with modified independence within 7 day(s). Outcome: Progressing Towards Goal   OCCUPATIONAL THERAPY EVALUATION  Patient: Diane Grimes (63 y.o. male)  Date: 6/27/2021  Primary Diagnosis: Stroke (cerebrum) ([facility] [address] [I63.9]        Precautions:        ASSESSMENT  Based on the objective data described below, the patient presents with ADLs impacted by left upper and lateral quadrant visual field cut, decreased L UE FM coordination, dynamic standing balance and overall activity tolerance (HTN and fatigue). Baseline memory s/p chemo and radiation. Great use of humor to compensate. Current Level of Function Impacting Discharge (ADLs/self-care): moderate assistance lower body ADLs; total assistance instrumental ADLs    Functional Outcome Measure:   The patient scored Total A-D  Total A-D (Motor Function): 64/66 on the Fugl-Diaz Assessment which is indicative of mild impairment in upper extremity functional status. Other factors to consider for discharge: wife independent, family supportive     Patient will benefit from skilled therapy intervention to address the above noted impairments. PLAN :  Recommendations and Planned Interventions: self care training, functional mobility training, therapeutic exercise, balance training, visual/perceptual training, therapeutic activities, cognitive retraining, endurance activities, neuromuscular re-education, patient education, home safety training, and family training/education    Frequency/Duration: Patient will be followed by occupational therapy 5 times a week to address goals. Recommendation for discharge: (in order for the patient to meet his/her long term goals)  HHHOT to increase independence and safety in the home for fall prevention. No driving  or car until L visual field cut resolved or patient is well compensated in the home, progress to  and if pushing issue to drive then family in car for close driving supervision short distances. This discharge recommendation:  Has not yet been discussed the attending provider and/or case management    IF patient discharges home will need the following DME: none       SUBJECTIVE:   Patient stated All I have left is driving.     OBJECTIVE DATA SUMMARY:   HISTORY:   Past Medical History:   Diagnosis Date    GERD (gastroesophageal reflux disease)     Raynaud disease     Tonsil cancer ([facility] [address]      Past Surgical History:   Procedure Laterality Date    HX GI      colonscopy    HX HEENT      HX OTHER SURGICAL      Bilateral Inguinal Hernias    HX TONSILLECTOMY      August 2016    HX UROLOGICAL      vasectomy    HX VASCULAR ACCESS       Expanded or extensive additional review of patient history:     Home Situation  Home Environment: Private residence  # Steps to Enter: 4  Rails to Enter: Yes  One/Two Story Residence: Two story, live on 1st floor  Living Alone: No  Support Systems: Spouse/Significant Other/Partner  Patient Expects to be Discharged to[de-identified] House  Current DME Used/Available at Home: Blood pressure cuff  Tub or Shower Type: Tub/Shower combination    Hand dominance: Right    EXAMINATION OF PERFORMANCE DEFICITS:  Cognitive/Behavioral Status:  Neurologic State: Alert;Eyes open spontaneously  Orientation Level: Oriented X4  Cognition: Follows commands  Perception: Cues to attend left visual field;Cues to maintain midline in sitting  Perseveration: No perseveration noted  Safety/Judgement: Decreased awareness of need for safety;Decreased insight into deficits; Decreased awareness of need for assistance    Skin: intact    Edema: intact    Hearing: Auditory  Auditory Impairment: None    Vision/Perceptual:    Tracking: Requires cues, head turns, or add eye shifts to track    Saccades: Additional eye shifts occurred during testing         Visual Fields: Difficulty detecting stimulus in left upper quadrant; Difficulty detecting stimulus  in left lateral quadrant  Diplopia: No    Acuity: Impaired near vision    Corrective Lenses: Reading glasses (baseline)    Range of Motion:  B UEs                            Strength:  B UEs WDL                   Coordination:     Fine Motor Skills-Upper: Right Intact; Left Impaired    Gross Motor Skills-Upper: Left Intact; Right Intact    Tone & Sensation:                              Balance:  Sitting: Intact; Without support  Standing: Impaired; Without support  Standing - Static: Good  Standing - Dynamic : Fair    Functional Mobility and Transfers for ADLs:  Bed Mobility:  Supine to Sit: Minimum assistance (exit R, assist covers off L foot)  Sit to Supine: Supervision (A covers)  Scooting: Modified independent    Transfers:  Sit to Stand: Modified independent  Stand to Sit: Modified independent  Bathroom Mobility: Minimum assistance  Tub Transfer: Total assistance (not safe at this time)    ADL Assessment:  Feeding: Independent    Oral Facial Hygiene/Grooming: Minimum assistance sitting EOB    Bathing: Moderate assistance infer    Upper Body Dressing: Minimum assistance infer    Lower Body Dressing: Moderate assistance upon return patient did not don shoes for bone spur protection due to fatigue; daughter stating did don prior    Toileting: Moderate assistance fair standing     Patient received sitting EOB, finishing lunch, transport arrived, donned 2 strap sandals, stood and completed functional mobility to stretcher. Patient second attempt supine due to fatigue, agreeable for therapy walking to sink to brush teeth, once sitting EOB patient did not recall task and felt \"whoozy\", noted BP elevated. Instruction on safety, pacing, s/s monitoring and application in the home. Patient covering up symptoms with joking but asking for items to be setup on beside tray. ADL Intervention and task modifications:     Patient instructed and indicated understanding the benefits of maintaining activity tolerance, functional mobility, and independence with self care tasks during acute stay  to ensure safe return home and to baseline. Encouraged patient to increase frequency and duration OOB, be out of bed for all meals, perform daily ADLs (as approved by RN/MD regarding bathing etc), and performing functional mobility to/from bathroom. Thorough discussion on safety of symptom management BP, pacing activity; L visual field cut and [place] technique for independence and fall prevention. No driving for his safety and safety of others; instruction when safe. Cognitive Retraining  Safety/Judgement: Decreased awareness of need for safety;Decreased insight into deficits; Decreased awareness of need for assistance    Therapeutic Exercise:     Functional Measure:  Fugl-Diaz Assessment of Motor Recovery after Stroke:   Reflex Activity  Flexors/Biceps/Fingers: Can be elicited  Extensors/Triceps: Can be elicited  Reflex Subtotal: 4    Volitional Movement Within Synergies  Shoulder Retraction: Full  Shoulder Elevation: Full  Shoulder Abduction (90 degrees): Full  Shoulder External Rotation: Full  Elbow Flexion: Full  Forearm Supination: Full  Shoulder Adduction/Internal Rotation: Full  Elbow Extension: Full  Forearm Pronation: Full  Subtotal: 18    Volitional Movement Mixing Synergies  Hand to Lumbar Spine: Full  Shoulder Flexion (0-90 degrees): Full  Pronation-Supination: Full  Subtotal: 6    Volitional Movement With Little or No Synergy  Shoulder Abduction (0-90 degrees): Full  Shoulder Flexion ( degrees): Full  Pronation/Supination: Full  Subtotal : 6    Normal Reflex Activity  Biceps, Triceps, Finger Flexors: Full  Subtotal : 2    Upper Extremity Total   Upper Extremity Total: 36    Wrist  Stability at 15 Degree Dorsiflexion: Full  Repeated Dorsiflexion/ Volar Flexion: Full  Stability at 15 Degree Dorsiflexion: Full  Repeated Dorsiflexion/ Volar Flexion: Full  Circumduction: Full  Wrist Total: 10    Hand  Mass Flexion: Full  Mass Extension: Full  Grasp A: Full  Grasp B: Full  Grasp C: Full  Grasp D: Full  Grasp E: Full  Hand Total: 14    Coordination/Speed  Tremor: None  Dysmetria: Slight  Time: 2-5s  Coordination/Speed Total : 4    Total A-D  Total A-D (Motor Function): 64/66     This is a reliable/valid measure of arm function after a neurological event. It has established value to characterize functional status and for measuring spontaneous and therapy-induced recovery; tests proximal and distal motor functions. Fugl-Diaz Assessment - UE scores recorded between five and 30 days post neurologic event can be used to predict UE recovery at six months post neurologic event.   Severe = 0-21 points   Moderately Severe = 22-33 points   Moderate = 34-47 points   Mild = 48-66 points  FRANCISCO Curran, TISHA Ziegler, ZURI Lester, & SONIA Bustillos (1992). Measurement of motor recovery after stroke: Outcome assessment and sample size requirements. Stroke, 23, pp. 9404-4045.   ------------------------------------------------------------------------------------------------------------------------------------------------------------------  MCID:  Stroke:   Masha Jerez et al, 2001; n = 171; mean age 79 (6) years; assessed within 16 (15) days of stroke, Acute Stroke)  FMA Motor Scores from Admission to Discharge   10 point increase in FMA Upper Extremity = 1.5 change in discharge FIM   10 point increase in FMA Lower Extremity = 1.9 change in discharge FIM  MDC:   Stroke:   Ana Maria Dasilva et al, 2008, n = 14, mean age = 59.9 (14.6) years, assessed on average 14 (6.5) months post stroke, Chronic Stroke)   FMA = 5.2 points for the Upper Extremity portion of the assessment     Occupational Therapy Evaluation Charge Determination   History Examination Decision-Making           Based on the above components, the patient evaluation is determined to be of the following complexity level:   Pain Rating:      Activity Tolerance:   Vitals:    06/27/21 1213 06/27/21 1409 06/27/21 1440 06/27/21 1524   BP: (!) 147/81 (!) 146/67 (!) 160/82 (!) 146/79   BP 1 Location:       BP Patient Position:   Sitting Supine   Pulse:  (!) 59 67 61   Temp:  98.2 °F (36.8 °C)     Resp:  17     Height: 5' 9\" (1.753 m)      Weight: 73 kg (160 lb 15 oz)      SpO2:  99%           After treatment patient left in no apparent distress:    Supine in bed, Call bell within reach, Caregiver / family present, and Side rails x 3    COMMUNICATION/EDUCATION:   The patients plan of care was discussed with: Registered nurse. Patient was educated regarding his deficit(s) of L upper and lateral visual field cut, L UE decreased coordination as this relates to his diagnosis of CVA workup. He demonstrated good understanding as evidenced by repeat back.     Patient and/or family was verbally educated on the BE FAST acronym for signs/symptoms of CVA and TIA. BE FAST was written on patient's communication board  for visual education and reinforcement. All questions answered with patient indicating good understanding. Home safety education was provided and the patient/caregiver indicated understanding., Patient/family have participated as able in goal setting and plan of care. , and Patient/family agree to work toward stated goals and plan of care. This patients plan of care is appropriate for delegation to JAMES.     Thank you for this referral.  Samira Frame  Time Calculation: 42 mins

## 2021-06-27 NOTE — PROGRESS NOTES
Hospitalist Progress Note              Mika Pizarro MD.                                                             Cell: [phone]                               NAME:  Liliam Landis III  :  1935  MRN:  [id number]  Date of Service:  2021    Summary: 80 y.o. male with history of throat cancer, gastroesophageal reflux disease, Raynaud's disease who presented on 2021 [facility] with a complaint of left arm numbness, tingling and left facial droop. MRI performed on admission showed acute infarct right temporal lobe medially. Chronic microangiopathic       Assessment/Plan:  Right temporal lobe cerebrovascular accident; present on admission  Left upper extremity facial weakness or numbness now back to baseline  MRI brain confirms acute infarct right temporal lobe medially. Transthoracic echocardiogram performed negative for PFO  Lipid panel normal  Patient is on aspirin 81 mg daily and Lipitor 80 mg nightly  Appreciate neurology input  Continue PT OT  Likely DC in a.m. Sinus bradycardia  Asymptomatic       Code status: Full code  DVT prophylaxsis: SCD  Dispo: Likely discharge home in a.m. Interval History/Subjective: Follow-up for right temporal lobe cerebrovascular accident  No acute overnight events  No new complaints  He states left arm weakness has resolved. She has no slurred speech or visual disturbance    Review of Systems:  A comprehensive review of systems was negative except for that written in the HPI. Objective:     VITALS:   Last 24hrs VS reviewed since prior progress note. Most recent are:  Visit Vitals  BP (!) 147/81   Pulse (!) 56   Temp 98.2 °F (36.8 °C)   Resp 13   Ht 5' 9\" (1.753 m)   Wt 73 kg (160 lb 15 oz)   SpO2 98%   BMI 23.77 kg/m²     No intake or output data in the 24 hours ending 21 1347     PHYSICAL EXAM:  General: No acute distress, cooperative, pleasant   EENT: EOMI.  Anicteric sclerae. Oral mucous moist, oropharynx benign  Resp: CTA bilaterally. No wheezing/rhonchi/rales. No accessory muscle use  CV: Regular rhythm, normal rate, no murmurs, gallops, rubs  GI: Soft, non distended, non tender. normoactive bowel sounds, no hepatosplenomegaly Extremities: No edema, warm, 2+ pulses throughout  Neurologic: Moves all extremities. AAOx3, CN II-XII grossly intact  Psych: Good insight. Not anxious nor agitated. Skin: Good Turgor, no rashes or ulcers    Lab Data Personally Reviewed: (see below)     Medications list Personally Reviewed:  x YES  NO     _______________________________________________________________________  Care Plan discussed with:  Patient/Family and Nurse    Total NON critical care TIME:  30 minutes    Pauline Shaw MD     Procedures: see electronic medical records for all procedures/Xrays and details which were not copied into this note but were reviewed prior to creation of Plan. LABS:  Recent Labs     06/27/21  0503 06/25/21 2005   WBC 7.9 7.5   HGB 14.9 14.5   HCT 43.9 42.3    244     Recent Labs     06/27/21  0503 06/25/21 2005    141   K 4.0 4.0    104   CO2 24 28   BUN 16 21*   CREA 0.75 1.05   * 89   CA 9.0 9.0     Recent Labs     06/25/21 2005   ALT 11*   AP 69   TBILI 0.2   TP 6.9   ALB 3.6   GLOB 3.3     Recent Labs     06/25/21 2005   INR 1.0   PTP 9.4   APTT 23.7      No results for input(s): FE, TIBC, PSAT, FERR in the last 72 hours. Lab Results   Component Value Date/Time    Folate 10.8 11/27/2016 06:53 AM      No results for input(s): PH, PCO2, PO2 in the last 72 hours.   Recent Labs     06/25/21 2005   TROIQ <0.05     Lab Results   Component Value Date/Time    Cholesterol, total 177 06/27/2021 05:03 AM    HDL Cholesterol 48 06/27/2021 05:03 AM    LDL, calculated 110 (H) 06/27/2021 05:03 AM    Triglyceride 95 06/27/2021 05:03 AM    CHOL/HDL Ratio 3.7 06/27/2021 05:03 AM     Lab Results   Component Value Date/Time Glucose (POC) 100 06/25/2021 07:46 PM     Lab Results   Component Value Date/Time    Color YELLOW/STRAW 06/25/2021 09:09 PM    Appearance CLEAR 06/25/2021 09:09 PM    Specific gravity 1.010 06/25/2021 09:09 PM    pH (UA) 6.0 06/25/2021 09:09 PM    Protein Negative 06/25/2021 09:09 PM    Glucose Negative 06/25/2021 09:09 PM    Ketone Negative 06/25/2021 09:09 PM    Bilirubin Negative 06/25/2021 09:09 PM    Urobilinogen 0.2 06/25/2021 09:09 PM    Nitrites Negative 06/25/2021 09:09 PM    Leukocyte Esterase Negative 06/25/2021 09:09 PM

## 2021-06-27 NOTE — CONSULTS
[address]    Name:  Lew Rai  MR#:  [id number]  :  1935  ACCOUNT #:  [de-identified]  DATE OF SERVICE:  2021    NEUROLOGIC CONSULTATION    HISTORY OF PRESENT ILLNESS:  This is an 85-year right-handed male who was admitted on 2021 with complaints of left-sided weakness. The patient reports he was having his nightly cocktail, when he went to reach for his glass, he realized his hand was numb and weak. His wife also felt like he had left facial droop. This was around 06:30. He states it lasted only 10-15 minutes, but EMS noted he had an unsteady gait on their arrival and had left-sided numbness and tingling still. The patient denies a history of hypertension, hyperlipidemia, diabetes, prior stroke. His wife believes he may actually have sleep apnea, has watched him have pauses in his breathing at night. He is not on any antiplatelet or anticoagulation agents at home. He does not smoke, but did up until  when he developed tonsil cancer. CT of the head revealed hypoattenuation in the anterior limb of the right internal capsule that appeared chronic, but new since prior imaging in 2017. His CTA of the head and neck unremarkable. His MRI of the brain confirms an acute stroke in the right medial temporal lobe, he also has chronic ischemic white matter disease. His echocardiogram, lipid profile, and hemoglobin A1c are all pending. Blood pressure at presentation was 194/84. The patient feels like he is back to his baseline and ready to go home. PAST MEDICAL HISTORY:  1. Tonsil cancer, status post resection and radiation therapy. 2.  Gastroesophageal reflux disease. 3.  Raynaud's. 4.  Bilateral inguinal hernias. 5.  Vasectomy. REVIEW OF SYSTEMS:  As per past medical history or HPI. In addition, the patient reporting severe dry mouth since treatment for his cancer. All other systems are negative. HOME MEDICATIONS:  1. Pepcid.   2. Meclizine. 3.  MiraLax. 4.  Ambien. 5.  Benadryl. 6.  Advil. 7.  He has been started on aspirin 81 mg a day. 8.  Lipitor 80 mg a day. ALLERGIES:  TALWIN. SOCIAL HISTORY:  He is . He lived near [place]. He was in real estate investment. He quit smoking in 2016. He drinks one alcoholic beverage a night. No drug use. FAMILY HISTORY:  Mom with dementia. Father with Parkinson's disease. PHYSICAL EXAMINATION:  VITAL SIGNS:  Blood pressure 151/71, pulse 67, respiratory rate 16, satting 96% on room air, temperature is 98.4, BMI of 23.8. GENERAL:  He is a well-nourished, well-developed, healthy-appearing elderly male, sitting in bed in no distress. HEART:  Has regular rate and rhythm without murmurs, gallops, rubs. Carotids are 2+. No bruits. EXTREMITIES:  Warm without edema. He has 2+ radial pulses. NEUROLOGIC:  Mental Status:  He is alert, oriented x4. Speech and language intact. Attention, memory, and fund of knowledge appropriate. Cranial nerve examination, he has no facial asymmetry or ptosis. Extraocular eye movements intact without diplopia or nystagmus. His visual fields are full. Pupils equally round and reactive. Tongue midline. Palate elevates symmetrically. Trapezius and sternocleidomastoid 5/5. Motor exam is 5/5 throughout. No pronator drift. No tremor. Sensory exam is intact to light touch and pinprick throughout. Reflexes are symmetric. Toes downgoing. Coordination intact to finger-to-nose, rapid alternating movements. Gait not assessed at this time. STUDIES AND REPORTS:  Reviewed above in the HPI. ASSESSMENT AND PLAN:  This is an 26-year-old right-handed male who was admitted on 6/25/21 with complaints of left upper extremity and facial weakness, numbness, noted to have an unsteady gait by EMS, quickly back to baseline with MRI of the brain revealing a right medial temporal stroke.   The patient does not have any known stroke risk factors other than prior smoking history and possible obstructive sleep apnea. We discussed the potential etiology of stroke, risk factor evaluation, and he was started on aspirin 81 mg a day and Lipitor 80 mg a day with his lipid profile, hemoglobin A1c, and echocardiogram still pending. He does not have any therapy needs. If his echo could be completed, he could be most likely discharge pending results.       MD GONZALO [email or web address]  D:  06/27/2021 7:23  T:  06/27/2021 11:22  JOB #:  [id number]

## 2021-06-27 NOTE — PROGRESS NOTES
Problem: Discharge Planning  Goal: *Discharge to safe environment  6/27/2021 0602 by Sanjuanita Aguilar RN  Outcome: Progressing Towards Goal  6/27/2021 0107 by Sanjuanita Aguilar RN  Outcome: Progressing Towards Goal  6/27/2021 0106 by Sanjuanita Aguilar RN  Outcome: Progressing Towards Goal     Problem: Falls - Risk of  Goal: *Absence of Falls  Description: Document Tram Spare Fall Risk and appropriate interventions in the flowsheet.   6/27/2021 0602 by Sanjuanita Aguilar RN  Outcome: Progressing Towards Goal  Note: Fall Risk Interventions:            Medication Interventions: Assess postural VS orthostatic hypotension, Bed/chair exit alarm, Evaluate medications/consider consulting pharmacy, Patient to call before getting OOB, Teach patient to arise slowly                6/27/2021 0107 by Sanjuanita Aguilar RN  Outcome: Progressing Towards Goal  Note: Fall Risk Interventions:            Medication Interventions: Bed/chair exit alarm, Patient to call before getting OOB                6/27/2021 0106 by Sanjuanita Aguilar RN  Outcome: Progressing Towards Goal  Note: Fall Risk Interventions:            Medication Interventions: Bed/chair exit alarm, Patient to call before getting OOB                   Problem: Patient Education: Go to Patient Education Activity  Goal: Patient/Family Education  6/27/2021 0602 by Sanjuanita Aguilar RN  Outcome: Progressing Towards Goal  6/27/2021 0107 by Sanjuanita Aguilar RN  Outcome: Progressing Towards Goal  6/27/2021 0106 by Sanjuanita Aguilar RN  Outcome: Progressing Towards Goal     Problem: Patient Education: Go to Patient Education Activity  Goal: Patient/Family Education  6/27/2021 0602 by Sanjuanita Aguilar RN  Outcome: Progressing Towards Goal  6/27/2021 0107 by Sanjuanita Aguilar RN  Outcome: Progressing Towards Goal  6/27/2021 0106 by Sanjuanita Aguilar RN  Outcome: Progressing Towards Goal     Problem: Patient Education: Go to Patient Education Activity  Goal: Patient/Family Education  6/27/2021 0602 by Doreen Umana RN  Outcome: Progressing Towards Goal  6/27/2021 0107 by Doreen Umana RN  Outcome: Progressing Towards Goal     Problem: TIA/CVA Stroke: 0-24 hours  Goal: Off Pathway (Use only if patient is Off Pathway)  6/27/2021 0602 by Doreen Umana RN  Outcome: Progressing Towards Goal  6/27/2021 0107 by Doreen Umana RN  Outcome: Progressing Towards Goal  Goal: Activity/Safety  6/27/2021 0602 by Doreen Umana RN  Outcome: Progressing Towards Goal  6/27/2021 0107 by Doreen Umana RN  Outcome: Progressing Towards Goal  Goal: Consults, if ordered  6/27/2021 0602 by Doreen Umana RN  Outcome: Progressing Towards Goal  6/27/2021 0107 by Doreen Umana RN  Outcome: Progressing Towards Goal  Goal: Diagnostic Test/Procedures  6/27/2021 0602 by Doreen Umana RN  Outcome: Progressing Towards Goal  6/27/2021 0107 by Doreen Umana RN  Outcome: Progressing Towards Goal  Goal: Nutrition/Diet  6/27/2021 0602 by Doreen Umana RN  Outcome: Progressing Towards Goal  6/27/2021 0107 by Doreen Umana RN  Outcome: Progressing Towards Goal  Goal: Discharge Planning  6/27/2021 0602 by Doreen Umana RN  Outcome: Progressing Towards Goal  6/27/2021 0107 by Doreen Umana RN  Outcome: Progressing Towards Goal  Goal: Medications  6/27/2021 0602 by Doreen Umana, RN  Outcome: Progressing Towards Goal  6/27/2021 0107 by Doreen Umana RN  Outcome: Progressing Towards Goal  Goal: Respiratory  6/27/2021 0602 by Doreen Umana, RN  Outcome: Progressing Towards Goal  6/27/2021 0107 by Doreen Umana RN  Outcome: Progressing Towards Goal  Goal: Treatments/Interventions/Procedures  6/27/2021 0602 by Doreen Umana RN  Outcome: Progressing Towards Goal  6/27/2021 0107 by Doreen Umana RN  Outcome: Progressing Towards Goal  Goal: Minimize risk of bleeding post-thrombolytic infusion  6/27/2021 0602 by Doreen Umana RN  Outcome: Progressing Towards Goal  6/27/2021 0107 by Tia Coburn RN  Outcome: Progressing Towards Goal  Goal: Monitor for complications post-thrombolytic infusion  6/27/2021 0602 by Tia Coburn RN  Outcome: Progressing Towards Goal  6/27/2021 0107 by Tia Coburn RN  Outcome: Progressing Towards Goal  Goal: Psychosocial  6/27/2021 0602 by Tia Coburn RN  Outcome: Progressing Towards Goal  6/27/2021 0107 by Tia Coburn RN  Outcome: Progressing Towards Goal  Goal: *Hemodynamically stable  6/27/2021 0602 by Tia Coburn RN  Outcome: Progressing Towards Goal  6/27/2021 0107 by Tia Coburn RN  Outcome: Progressing Towards Goal  Goal: *Neurologically stable  Description: Absence of additional neurological deficits    6/27/2021 0602 by Tia Coburn RN  Outcome: Progressing Towards Goal  6/27/2021 0107 by Tia Coburn RN  Outcome: Progressing Towards Goal  Goal: *Verbalizes anxiety and depression are reduced or absent  6/27/2021 0602 by Tia Coburn RN  Outcome: Progressing Towards Goal  6/27/2021 0107 by Tia Coburn RN  Outcome: Progressing Towards Goal  Goal: *Absence of Signs of Aspiration on Current Diet  6/27/2021 0602 by Tia Coburn RN  Outcome: Progressing Towards Goal  6/27/2021 0107 by Tia Coburn RN  Outcome: Progressing Towards Goal  Goal: *Absence of deep venous thrombosis signs and symptoms(Stroke Metric)  6/27/2021 0602 by Tia Coburn RN  Outcome: Progressing Towards Goal  6/27/2021 0107 by Tia Coburn RN  Outcome: Progressing Towards Goal  Goal: *Ability to perform ADLs and demonstrates progressive mobility and function  6/27/2021 0602 by Tia Coburn RN  Outcome: Progressing Towards Goal  6/27/2021 0107 by Tia Coburn RN  Outcome: Progressing Towards Goal  Goal: *Stroke education started(Stroke Metric)  6/27/2021 0602 by Tia Coburn RN  Outcome: Progressing Towards Goal  6/27/2021 0107 by Tia Coburn RN  Outcome: Progressing Towards Goal  Goal: *Dysphagia screen performed(Stroke Metric)  6/27/2021 0602 by Brant Mckeon, RN  Outcome: Progressing Towards Goal  6/27/2021 0107 by Brant Mckeon, RN  Outcome: Progressing Towards Goal  Goal: *Rehab consulted(Stroke Metric)  6/27/2021 0602 by Brant Mckeon, RN  Outcome: Progressing Towards Goal  6/27/2021 0107 by Brant Mckeon, RN  Outcome: Progressing Towards Goal     Problem: TIA/CVA Stroke: Day 2 Until Discharge  Goal: Off Pathway (Use only if patient is Off Pathway)  6/27/2021 0602 by Brant Mckeon, RN  Outcome: Progressing Towards Goal  6/27/2021 0107 by Brant Mckeon, RN  Outcome: Progressing Towards Goal  Goal: Activity/Safety  6/27/2021 0602 by Brant Mckeon, RN  Outcome: Progressing Towards Goal  6/27/2021 0107 by Brant Mckeon, RN  Outcome: Progressing Towards Goal  Goal: Diagnostic Test/Procedures  6/27/2021 0602 by Brant Mckeon, RN  Outcome: Progressing Towards Goal  6/27/2021 0107 by Brant Mckeon, RN  Outcome: Progressing Towards Goal  Goal: Nutrition/Diet  6/27/2021 0602 by Brant Mckeon, RN  Outcome: Progressing Towards Goal  6/27/2021 0107 by Brant Mckeon, RN  Outcome: Progressing Towards Goal  Goal: Discharge Planning  6/27/2021 0602 by Brant Mckeon, RN  Outcome: Progressing Towards Goal  6/27/2021 0107 by Brant Mckeon, RN  Outcome: Progressing Towards Goal  Goal: Medications  6/27/2021 0602 by Brant Mckeon, RN  Outcome: Progressing Towards Goal  6/27/2021 0107 by Brant Mckeon, RN  Outcome: Progressing Towards Goal  Goal: Respiratory  6/27/2021 0602 by Brant Mckeon, RN  Outcome: Progressing Towards Goal  6/27/2021 0107 by Brant Mckeon, RN  Outcome: Progressing Towards Goal  Goal: Treatments/Interventions/Procedures  6/27/2021 0602 by Brant Mckeon, RN  Outcome: Progressing Towards Goal  6/27/2021 0107 by Brant Mckeon, RN  Outcome: Progressing Towards Goal  Goal: Psychosocial  6/27/2021 0602 by Judi Rahman, Sia Childers, RN  Outcome: Progressing Towards Goal  6/27/2021 0107 by Armen Renteria RN  Outcome: Progressing Towards Goal  Goal: *Verbalizes anxiety and depression are reduced or absent  6/27/2021 0602 by Armen Renteria RN  Outcome: Progressing Towards Goal  6/27/2021 0107 by Armen Renteria RN  Outcome: Progressing Towards Goal  Goal: *Absence of aspiration  6/27/2021 0602 by Armen Renteria RN  Outcome: Progressing Towards Goal  6/27/2021 0107 by Armen Renteria RN  Outcome: Progressing Towards Goal  Goal: *Absence of deep venous thrombosis signs and symptoms(Stroke Metric)  6/27/2021 0602 by Armen Renteria RN  Outcome: Progressing Towards Goal  6/27/2021 0107 by Armen Renteria RN  Outcome: Progressing Towards Goal  Goal: *Optimal pain control at patient's stated goal  6/27/2021 0602 by Armen Renteria RN  Outcome: Progressing Towards Goal  6/27/2021 0107 by Armen Renteria RN  Outcome: Progressing Towards Goal  Goal: *Tolerating diet  6/27/2021 0602 by Armen Renteria RN  Outcome: Progressing Towards Goal  6/27/2021 0107 by Armen Renteria RN  Outcome: Progressing Towards Goal  Goal: *Ability to perform ADLs and demonstrates progressive mobility and function  6/27/2021 0602 by Armen Renteria RN  Outcome: Progressing Towards Goal  6/27/2021 0107 by Armen Renteria RN  Outcome: Progressing Towards Goal  Goal: *Stroke education continued(Stroke Metric)  6/27/2021 0602 by Armen Renteria RN  Outcome: Progressing Towards Goal  6/27/2021 0107 by Armen Renteria RN  Outcome: Progressing Towards Goal     Problem: Ischemic Stroke: Discharge Outcomes  Goal: *Verbalizes anxiety and depression are reduced or absent  6/27/2021 0602 by Armen Renteria RN  Outcome: Progressing Towards Goal  6/27/2021 0107 by Armen Renteria RN  Outcome: Progressing Towards Goal  Goal: *Verbalize understanding of risk factor modification(Stroke Metric)  6/27/2021 0602 by Armen Renteria RN  Outcome: Progressing Towards Goal  6/27/2021 0107 by Gregorio Roles, RN  Outcome: Progressing Towards Goal  Goal: *Hemodynamically stable  6/27/2021 0602 by Gregorio Jey, RN  Outcome: Progressing Towards Goal  6/27/2021 0107 by Gregorio Roles, RN  Outcome: Progressing Towards Goal  Goal: *Absence of aspiration pneumonia  6/27/2021 0602 by Gregorio Jey, RN  Outcome: Progressing Towards Goal  6/27/2021 0107 by Gregorio Roles, RN  Outcome: Progressing Towards Goal  Goal: *Aware of needed dietary changes  6/27/2021 0602 by Gregorio Roles, RN  Outcome: Progressing Towards Goal  6/27/2021 0107 by Gregorio Roles, RN  Outcome: Progressing Towards Goal  Goal: *Verbalize understanding of prescribed medications including anti-coagulants, anti-lipid, and/or anti-platelets(Stroke Metric)  6/27/2021 0602 by Gregorio Khanna, RN  Outcome: Progressing Towards Goal  6/27/2021 0107 by Gregorio Khanna, RN  Outcome: Progressing Towards Goal  Goal: *Tolerating diet  6/27/2021 0602 by Gregorio Khanna, RN  Outcome: Progressing Towards Goal  6/27/2021 0107 by Gregorio Khanna, RN  Outcome: Progressing Towards Goal  Goal: *Aware of follow-up diagnostics related to anticoagulants  6/27/2021 0602 by rGegorio Khanna, RN  Outcome: Progressing Towards Goal  6/27/2021 0107 by Gregorio Khanna, RN  Outcome: Progressing Towards Goal  Goal: *Ability to perform ADLs and demonstrates progressive mobility and function  6/27/2021 0602 by Gregorio Khanna, RN  Outcome: Progressing Towards Goal  6/27/2021 0107 by Gregorio Khanna, RN  Outcome: Progressing Towards Goal  Goal: *Absence of DVT(Stroke Metric)  6/27/2021 0602 by Gregorio Khanna, RN  Outcome: Progressing Towards Goal  6/27/2021 0107 by Gregorio Khanna, RN  Outcome: Progressing Towards Goal  Goal: *Absence of aspiration  6/27/2021 0602 by Gregorio Jey, RN  Outcome: Progressing Towards Goal  6/27/2021 0107 by Gregorio Jey, RN  Outcome: Progressing Towards Goal  Goal: *Optimal pain control at patient's stated goal  6/27/2021 0602 by Doreen Umana RN  Outcome: Progressing Towards Goal  6/27/2021 0107 by Doreen Umana RN  Outcome: Progressing Towards Goal  Goal: *Home safety concerns addressed  6/27/2021 0602 by Doreen Umana RN  Outcome: Progressing Towards Goal  6/27/2021 0107 by Doreen Umana RN  Outcome: Progressing Towards Goal  Goal: *Describes available resources and support systems  6/27/2021 0602 by Doreen Umana RN  Outcome: Progressing Towards Goal  6/27/2021 0107 by Doreen Umana RN  Outcome: Progressing Towards Goal  Goal: *Verbalizes understanding of activation of EMS(911) for stroke symptoms(Stroke Metric)  6/27/2021 0602 by Doreen Umana RN  Outcome: Progressing Towards Goal  6/27/2021 0107 by Doreen Umana RN  Outcome: Progressing Towards Goal  Goal: *Understands and describes signs and symptoms to report to providers(Stroke Metric)  6/27/2021 0602 by Doreen Umana RN  Outcome: Progressing Towards Goal  6/27/2021 0107 by Doreen Umana RN  Outcome: Progressing Towards Goal  Goal: *Neurolgocially stable (absence of additional neurological deficits)  6/27/2021 0602 by Doreen Umana RN  Outcome: Progressing Towards Goal  6/27/2021 0107 by Doreen Umana RN  Outcome: Progressing Towards Goal  Goal: *Verbalizes importance of follow-up with primary care physician(Stroke Metric)  6/27/2021 0602 by Doreen Umana RN  Outcome: Progressing Towards Goal  6/27/2021 0107 by Doreen Umana RN  Outcome: Progressing Towards Goal  Goal: *Smoking cessation discussed,if applicable(Stroke Metric)  6/27/2021 0602 by Doreen Umana RN  Outcome: Progressing Towards Goal  6/27/2021 0107 by Doreen Umana RN  Outcome: Progressing Towards Goal  Goal: *Depression screening completed(Stroke Metric)  6/27/2021 0602 by Doeren Umana RN  Outcome: Progressing Towards Goal  6/27/2021 0107 by Doreen Umana RN  Outcome: Progressing Towards Goal

## 2021-06-28 VITALS
OXYGEN SATURATION: 95 % | TEMPERATURE: 97.5 F | SYSTOLIC BLOOD PRESSURE: 151 MMHG | BODY MASS INDEX: 23.9 KG/M2 | HEIGHT: 69 IN | WEIGHT: 161.38 LBS | HEART RATE: 65 BPM | DIASTOLIC BLOOD PRESSURE: 73 MMHG | RESPIRATION RATE: 15 BRPM

## 2021-06-28 PROCEDURE — 94762 N-INVAS EAR/PLS OXIMTRY CONT: CPT

## 2021-06-28 PROCEDURE — 74011250637 HC RX REV CODE- 250/637: Performed by: NURSE PRACTITIONER

## 2021-06-28 PROCEDURE — 92610 EVALUATE SWALLOWING FUNCTION: CPT

## 2021-06-28 RX ORDER — ATORVASTATIN CALCIUM 80 MG/1
80 TABLET, FILM COATED ORAL
Qty: 30 TABLET | Refills: 1 | Status: SHIPPED | OUTPATIENT
Start: 2021-06-28

## 2021-06-28 RX ORDER — AMLODIPINE BESYLATE 5 MG/1
5 TABLET ORAL DAILY
Qty: 30 TABLET | Refills: 1 | Status: SHIPPED | OUTPATIENT
Start: 2021-06-28

## 2021-06-28 RX ORDER — GUAIFENESIN 100 MG/5ML
81 LIQUID (ML) ORAL DAILY
Qty: 90 TABLET | Refills: 1 | Status: SHIPPED | OUTPATIENT
Start: 2021-06-29

## 2021-06-28 RX ADMIN — ASPIRIN 81 MG CHEWABLE TABLET 81 MG: 81 TABLET CHEWABLE at 08:22

## 2021-06-28 RX ADMIN — ACETAMINOPHEN 650 MG: 325 TABLET ORAL at 07:49

## 2021-06-28 NOTE — PROGRESS NOTES
Transition of Care Plan   RUR- 9% Low Risk   DISPOSITION: The disposition plan is home with family assistance/home with home health - will follow up with PCP to assist with HHPT/OT in the [facility] area.  F/U with PCP/Specialist     Transport: Family - Spouse    At 12:50pm - CM met with patient and patients wife at bedside to check in. Patient has been recommended for HHPT/OT. Patients wife reports that the reside in the [facility] and have a PCP that can assist them. CM attempted to provide [facility] choice list. Patient reports that he will follow up with PCP to assist with finding [facility] for PT/OT. CM will continue to follow, provide support and assist with [facility] needs as they arise.     Grant Maloney

## 2021-06-28 NOTE — PROGRESS NOTES
SPEECH PATHOLOGY BEDSIDE SWALLOW EVALUATION/DISCHARGE  Patient: Shirlene Bridges III (63 y.o. male)  Date: 6/28/2021  Primary Diagnosis: Stroke (cerebrum) [facility]) [I63.9]       Precautions: Fall       ASSESSMENT :  Based on the objective data described below, the patient presents with functional oropharyngeal swallow with no difficulties or s/s of aspiration appreciated at bedside with any consistencies. Patient denies any speech or swallowing concerns at this time. Suspect pt is at baseline swallow function and therefore, skilled acute therapy provided by a speech-language pathologist is not indicated at this time. SLP will sign off. PLAN :  Recommendations:  -- regular diet/thin liquids   -- general aspiration precautions including completely upright for all PO   -- SLP will sign off  Discharge Recommendations: None     SUBJECTIVE:   Patient stated No, I've never had trouble.     OBJECTIVE:     Past Medical History:   Diagnosis Date    GERD (gastroesophageal reflux disease)     Raynaud disease     Tonsil cancer ([facility] [address]      Past Surgical History:   Procedure Laterality Date    HX GI      colonscopy    HX HEENT      HX OTHER SURGICAL      Bilateral Inguinal Hernias    HX TONSILLECTOMY      August 2016    HX UROLOGICAL      vasectomy    HX VASCULAR ACCESS       Prior Level of Function/Home Situation:   Home Situation  Home Environment: Private residence  # Steps to Enter: 4  Rails to Enter: Yes  One/Two Story Residence: Two story, live on 1st floor  Living Alone: No  Support Systems: Spouse/Significant Other/Partner  Patient Expects to be Discharged to[de-identified] House  Current DME Used/Available at Home: Blood pressure cuff  Tub or Shower Type: Tub/Shower combination  Diet prior to admission: regular/thin  Current Diet:  Regular/thin   Cognitive and Communication Status:  Neurologic State: Alert  Orientation Level: Oriented X4  Cognition: Appropriate decision making  Perception: Appears intact  Perseveration: No perseveration noted  Safety/Judgement: Awareness of environment  Oral Assessment:  Oral Assessment  Labial: No impairment  Dentition: Intact; Natural  Oral Hygiene: moist oral mucosa free of secretions  Lingual: No impairment  Velum: No impairment  Mandible: No impairment  P.O. Trials:  Patient Position: sitting upright at edge of bed  Vocal quality prior to P.O.: No impairment  Consistency Presented: Thin liquid; Solid  How Presented: Self-fed/presented; Successive swallows;Straw     Bolus Acceptance: No impairment  Bolus Formation/Control: No impairment     Propulsion: No impairment  Oral Residue: None  Initiation of Swallow: No impairment  Laryngeal Elevation: Functional  Aspiration Signs/Symptoms: None  Pharyngeal Phase Characteristics: No impairment, issues, or problems              Oral Phase Severity: No impairment  Pharyngeal Phase Severity : No impairment  NOMS:   The NOMS functional outcome measure was used to quantify this patient's level of swallowing impairment. Based on the NOMS, the patient was determined to be at level 7 for swallow function     NOMS Swallowing Levels:  Level 1 (CN): NPO  Level 2 (CM): NPO but takes consistency in therapy  Level 3 (CL): Takes less than 50% of nutrition p.o. and continues with nonoral feedings; and/or safe with mod cues; and/or max diet restriction  Level 4 (CK): Safe swallow but needs mod cues; and/or mod diet restriction; and/or still requires some nonoral feeding/supplements  Level 5 (CJ): Safe swallow with min diet restriction; and/or needs min cues  Level 6 (CI): Independent with p.o.; rare cues; usually self cues; may need to avoid some foods or needs extra time  Level 7 ([address] [place] [address]): Independent for all p.o.  CUCA. (2003). National Outcomes Measurement System (NOMS): Adult Speech-Language Pathology User's Guide.        Pain:  Pain Scale 1: Numeric (0 - 10)  Pain Intensity 1: 0  Pain Location 1: Head  After treatment:   Patient left in no apparent distress in bed, Call bell within reach, Nursing notified and Caregiver / family present    COMMUNICATION/EDUCATION:     The patient's plan of care including recommendations, planned interventions, and recommended diet changes were discussed with: Registered nurse.      Thank you for this referral.  EDUIN Luna  Time Calculation: 12 mins

## 2021-06-28 NOTE — DISCHARGE SUMMARY
Discharge Summary     PATIENT ID: Isabelle Way III  MRN: [id number]   YOB: 1935    DATE OF ADMISSION: 6/26/2021  6:24 AM    DATE OF DISCHARGE: 6/28/2021  PRIMARY CARE PROVIDER: Mary Garcia   DISCHARGING PROVIDER: Gayle Reyes MD    To contact this individual call [phone] and ask the  to page. If unavailable ask to be transferred the Adult Hospitalist Department. CONSULTATIONS: IP CONSULT TO NEUROLOGY    PROCEDURES/SURGERIES: * No surgery found *    ADMITTING [address] [place] [address] COURSE:   Isabelle Way III is a 80 y.o. male with a past medical history of throat ca, GERD, and Raynauds disease who present [facility] ED with complaints of left arm numbness and tingling that started one hour prior to arrival. Wife reported facial droop. Symptoms started around 6:35pm and  resolved within 15 minutes. CT head: Interval development of ischemic infarct in the anterior limb of the right internal capsule. Equivocal change in the right MCA, not confirmed on the second axial series. Patient was transferred to [facility] for further stroke workup   Upon examination he is AAOx4. States he was about to have his nightly drink and as he was bring the glass to his mouth his left arm started having numbness and tingling. He wait a few minutes and when they didn't resolve he called his wife to call 911. He also tried taking his blood pressure but couldn't remember at the time how to put the cuff on and work the machine. Normally has no problem,  States symptoms have resolved and he is feeling his \"normal self\". Strength equal and strong in all 4 extremities. DISCHARGE DIAGNOSES / PLAN:      Right temporal lobe cerebrovascular accident; present on admission  Left upper extremity facial weakness or numbness now back to baseline  MRI brain confirms acute infarct right temporal lobe medially.   Transthoracic echocardiogram performed negative for PFO  Lipid panel normal  Patient is on aspirin 81 mg daily and Lipitor 80 mg nightly  Appreciate neurology input:   Continue ASA 81mg daily  BP borderline, start norvasc, f/u OP  Continue Lipitor 80mg daily  Out pt sleep evaluation for BARB  Decrease Ambien to 5mg qhs if needed, discussed w pt and wife at bedside  -F/u in neurology at next available    Sinus bradycardia  Asymptomatic       ADDITIONAL CARE RECOMMENDATIONS: f/u PCP, neurology    PENDING TEST RESULTS:   At the time of discharge the following test results are still pending: none    Patient Instructions     FOLLOW UP APPOINTMENTS:    Follow-up Information     Follow up With Specialties Details Why Contact Info    [facility], [address] [place] [address] [address] [address]      Lorna Guzman MD Neurology Schedule an appointment as soon as possible for a visit  [address] [place] [address]  [phone]           DIET: Cardiac Diet    ACTIVITY: Activity as tolerated    NOTIFY YOUR PHYSICIAN FOR ANY OF THE FOLLOWING:   Fever over 101 degrees for 24 hours. Chest pain, shortness of breath, fever, chills, nausea, vomiting, diarrhea, change in mentation, falling, weakness, bleeding. Severe pain or pain not relieved by medications. Or, any other signs or symptoms that you may have questions about. DISPOSITION:   x Home With:  x OT x PT x HH  RN       Long term SNF/Inpatient Rehab    Independent/assisted living    Hospice    Other:       PATIENT CONDITION AT DISCHARGE:   Functional status    Poor    x Deconditioned     Independent      Cognition   x  Lucid     Forgetful     Dementia      Catheters/lines (plus indication)    Powers     PICC     PEG    x None      Code status   x  Full code     DNR      PHYSICAL EXAMINATION AT DISCHARGE:  General:  Alert, cooperative, no distress  Back:    Symmetric, ROM normal. No CVA tenderness.   Lungs:   Clear to auscultation bilaterally, symmetric expansion, no respiratory distress  Chest wall:  No tenderness or deformity  Heart: Regular rate and rhythm  Abdomen:   Soft, non-tender  Extremities: Extremities normal, atraumatic, no cyanosis or edema  Skin:  Skin color, texture, turgor normal. No rashes or lesions  Neurologic: CNII-XII intact. MEAD. A&O    CHRONIC MEDICAL DIAGNOSES:  Problem List as of 6/28/2021 Date Reviewed: 7/6/2020        Codes Class Noted - Resolved    Stroke (cerebrum) ([facility] ICD-10-CM: I63.9  ICD-9-CM: 434.91  6/26/2021 - Present        History of cancer tonsil ICD-10-CM: Z85.818  ICD-9-CM: V10.02  7/30/2018 - Present        History of cancer chemotherapy ICD-10-CM: Z92.21  ICD-9-CM: V87.41  1/25/2018 - Present        History of therapeutic radiation ICD-10-CM: Z92.3  ICD-9-CM: V15.3  1/25/2018 - Present        Anemia ICD-10-CM: D64.9  ICD-9-CM: 285.9  11/26/2016 - Present        Dehydration ICD-10-CM: E86.0  ICD-9-CM: 276.51  11/25/2016 - Present        Malnutrition ([facility] ICD-10-CM: E46  ICD-9-CM: 263.9  11/25/2016 - Present        Esophagitis ICD-10-CM: K20.90  ICD-9-CM: 530.10  11/25/2016 - Present        Tonsil cancer ([facility] ICD-10-CM: C09.9  ICD-9-CM: 146.0  9/26/2016 - Present              DISCHARGE MEDICATIONS:  Current Discharge Medication List      START taking these medications    Details   aspirin 81 mg chewable tablet Take 1 Tablet by mouth daily. Qty: 90 Tablet, Refills: 1  Start date: 6/29/2021      atorvastatin (LIPITOR) 80 mg tablet Take 1 Tablet by mouth nightly. Qty: 30 Tablet, Refills: 1  Start date: 6/28/2021      amLODIPine (Norvasc) 5 mg tablet Take 1 Tablet by mouth daily. Qty: 30 Tablet, Refills: 1  Start date: 6/28/2021         CONTINUE these medications which have NOT CHANGED    Details   famotidine (PEPCID) 20 mg tablet Take 1 Tab by mouth. ibuprofen (ADVIL) 200 mg tablet 200 mg.      polyethylene glycol (MIRALAX) 17 gram packet Take 1 Packet by mouth daily.   Qty: 30 Packet, Refills: 2      diphenhydrAMINE-acetaminophen  mg tab Take 1 Tab by mouth.      meclizine (ANTIVERT) 25 mg tablet Take 1 Tab by mouth three (3) times daily as needed for Dizziness. Qty: 20 Tab, Refills: 0      artificial saliva (MOUTH KOTE) spra Take 1 Spray by mouth as needed for Other. Qty: 240 mL, Refills: 1      aluminum-magnesium hydroxide 200-200 mg/5 mL susp 30 mL, diphenhydrAMINE 12.5 mg/5 mL elix 75 mg, lidocaine 2 % soln 30 mL oral suspension (compounded) Take 5 mL by mouth Before breakfast, lunch, dinner and at bedtime. Qty: 1 Bottle, Refills: 2      zolpidem (AMBIEN) 10 mg tablet Take 10 mg by mouth nightly as needed for Sleep.            Greater than 30 minutes were spent with the patient on counseling and coordination of care    Signed:   Lindy Magana MD  6/28/2021  12:19 PM

## 2021-06-28 NOTE — PROGRESS NOTES
Hospital follow-up PCP transitional care appointment has been scheduled with Dr. Nam Higginbotham for Tuesday, 7/6/21 at 3:00 p.m. Pending patient discharge.   Harinder Logan, Care Management Specialist.

## 2021-06-28 NOTE — PROGRESS NOTES
Bedside shift change report given to Brice Flores RN (oncoming nurse) by Mau Noriega RN (offgoing nurse). Report included the following information SBAR, Kardex, Intake/Output, MAR, Recent Results, Cardiac Rhythm NSR/SB and Dual Neuro Assessment.

## 2021-06-28 NOTE — PROGRESS NOTES
I have reviewed discharge instructions with the patient and spouse. The patient and spouse verbalized understanding. PIV and telemetry discontinued . Patient discharged to home via family with discharge instructions and belongings.          Stroke Education documented in Patient Education: YES  Core Measures Documented in Connect Care: YES  Risk Factors: YES  Warning signs of stroke: YES  When to Activate 911: YES  Medication Education for Risk Factors: YES  Smoking cessation if applicable: N/A  Written Education Given:  YES    Discharge NIH Completed: YES  Score: 2    BRAINS: YES    Follow Up Appointment Made: YES  Date/Time if applicable: 7/5/64 at 3pm

## 2021-07-01 LAB
ATRIAL RATE: 56 BPM
CALCULATED P AXIS, ECG09: 58 DEGREES
CALCULATED R AXIS, ECG10: 26 DEGREES
CALCULATED T AXIS, ECG11: 80 DEGREES
DIAGNOSIS, 93000: NORMAL
P-R INTERVAL, ECG05: 154 MS
Q-T INTERVAL, ECG07: 460 MS
QRS DURATION, ECG06: 102 MS
QTC CALCULATION (BEZET), ECG08: 443 MS
VENTRICULAR RATE, ECG03: 56 BPM

## 2021-07-06 ENCOUNTER — TELEPHONE (OUTPATIENT)
Dept: NEUROLOGY | Age: 86
End: 2021-07-06

## 2021-07-06 NOTE — TELEPHONE ENCOUNTER
Spoke w/ Pt. Verified. Pt inform that he has been added to cancellation list. Pt states that he do not want a referral to do the sleep evaluation.

## 2021-07-06 NOTE — TELEPHONE ENCOUNTER
Mamie - I have no appointments and no solutions. You may put him on the wait list. He does not need to be seen urgently since I just saw him 9 days ago. What the hospitalist put on the discharge paperwork and reality of availability vary greatly. Most importantly, he should see his PCP to f/u his cholesterol. He does need a sleep evaluation for obstructive sleep apnea and I am happy to place that referral if they would like me to.

## 2021-07-06 NOTE — TELEPHONE ENCOUNTER
Please advise    ----- Message from Maximino Sanchez sent at 7/6/2021 11:06 AM EDT -----  Regarding: Dr. Nicky Naidus first and last name: Cheyanne Miles, wife    Reason for call: MOOK WAGNER appt    Callback required yes/no and why: Yes, to schedule IBETH appt    Best contact number(s): [phone] or Wen Button, daughter [phone] Cell    Details to clarify the request: Pt was admitted into [facility] on 6/26/21 and d/c'd on 6/28/21 for stroke. Pt saw Dr. Machelle Jessica in hospital and was advised to schedule appt with her as soon as possible.

## 2021-09-02 ENCOUNTER — HOSPITAL ENCOUNTER (OUTPATIENT)
Dept: ULTRASOUND IMAGING | Age: 86
Discharge: HOME | End: 2021-09-02
Attending: PSYCHIATRY & NEUROLOGY
Payer: MEDICARE

## 2021-09-02 DIAGNOSIS — I69.90 LATE EFFECTS OF CVA (CEREBROVASCULAR ACCIDENT): ICD-10-CM

## 2021-09-02 LAB
LEFT CCA DIST DIAS: 8 CM/S
LEFT CCA DIST SYS: 90 CM/S
LEFT CCA PROX DIAS: 7.7 CENTIMETER/SECOND
LEFT CCA PROX SYS: 90.1 CENTIMETER/SECOND
LEFT ECA DIAS: 5.55 CENTIMETER/SECOND
LEFT ECA SYS: 108.6 CENTIMETER/SECOND
LEFT ICA DIST DIAS: 10.2 CENTIMETER/SECOND
LEFT ICA DIST SYS: 54.8 CENTIMETER/SECOND
LEFT ICA MID DIAS: 13.8 CENTIMETER/SECOND
LEFT ICA MID SYS: 82.7 CENTIMETER/SECOND
LEFT ICA PROX DIAS: 8 CM/S
LEFT ICA PROX SYS: 49 CM/S
LEFT ICA/CCA SYS: 0.92
LEFT VERTEBRAL DIAS: 6.59 CENTIMETER/SECOND
LEFT VERTEBRAL SYS: 69.3 CENTIMETER/SECOND
RIGHT CCA DIST DIAS: 6.9 CENTIMETER/SECOND
RIGHT CCA DIST SYS: 69.5 CENTIMETER/SECOND
RIGHT CCA PROX DIAS: 8 CM/S
RIGHT CCA PROX SYS: 95 CM/S
RIGHT ECA DIAS: 6.59 CENTIMETER/SECOND
RIGHT ECA SYS: 118 CENTIMETER/SECOND
RIGHT ICA DIST DIAS: 14 CENTIMETER/SECOND
RIGHT ICA DIST SYS: 65.4 CENTIMETER/SECOND
RIGHT ICA MID DIAS: 18.5 CENTIMETER/SECOND
RIGHT ICA MID SYS: 89.4 CENTIMETER/SECOND
RIGHT ICA PROX DIAS: 11 CM/S
RIGHT ICA PROX SYS: 62 CM/S
RIGHT ICA/CCA SYS: 0.9
RIGHT VERTEBRAL DIAS: 8.17 CENTIMETER/SECOND
RIGHT VERTEBRAL SYS: 40.1 CENTIMETER/SECOND

## 2021-09-02 PROCEDURE — 93880 EXTRACRANIAL BILAT STUDY: CPT

## 2021-09-08 ENCOUNTER — DOCUMENTATION ONLY (OUTPATIENT)
Dept: ONCOLOGY | Age: 86
End: 2021-09-08

## 2021-09-08 ENCOUNTER — OFFICE VISIT (OUTPATIENT)
Dept: ONCOLOGY | Age: 86
End: 2021-09-08
Payer: MEDICARE

## 2021-09-08 VITALS
TEMPERATURE: 97.7 F | HEIGHT: 69 IN | OXYGEN SATURATION: 97 % | BODY MASS INDEX: 23.02 KG/M2 | SYSTOLIC BLOOD PRESSURE: 124 MMHG | DIASTOLIC BLOOD PRESSURE: 68 MMHG | HEART RATE: 62 BPM | WEIGHT: 155.4 LBS

## 2021-09-08 DIAGNOSIS — Z92.3 HISTORY OF THERAPEUTIC RADIATION: ICD-10-CM

## 2021-09-08 DIAGNOSIS — I63.9 CEREBROVASCULAR ACCIDENT (CVA), UNSPECIFIED MECHANISM (HCC): ICD-10-CM

## 2021-09-08 DIAGNOSIS — Z85.818 HISTORY OF CANCER TONSIL: Primary | ICD-10-CM

## 2021-09-08 DIAGNOSIS — Z92.21 HISTORY OF CANCER CHEMOTHERAPY: ICD-10-CM

## 2021-09-08 DIAGNOSIS — K86.89 PANCREATIC MASS: ICD-10-CM

## 2021-09-08 PROCEDURE — G8420 CALC BMI NORM PARAMETERS: HCPCS | Performed by: INTERNAL MEDICINE

## 2021-09-08 PROCEDURE — 99213 OFFICE O/P EST LOW 20 MIN: CPT | Performed by: INTERNAL MEDICINE

## 2021-09-08 PROCEDURE — G8427 DOCREV CUR MEDS BY ELIG CLIN: HCPCS | Performed by: INTERNAL MEDICINE

## 2021-09-08 PROCEDURE — G0463 HOSPITAL OUTPT CLINIC VISIT: HCPCS | Performed by: INTERNAL MEDICINE

## 2021-09-08 PROCEDURE — G8510 SCR DEP NEG, NO PLAN REQD: HCPCS | Performed by: INTERNAL MEDICINE

## 2021-09-08 PROCEDURE — G8536 NO DOC ELDER MAL SCRN: HCPCS | Performed by: INTERNAL MEDICINE

## 2021-09-08 PROCEDURE — 1101F PT FALLS ASSESS-DOCD LE1/YR: CPT | Performed by: INTERNAL MEDICINE

## 2021-09-08 NOTE — LETTER
9/8/2021    Patient: Krista Burgess III   YOB: 1935   Date of Visit: 9/8/2021     Nicki Colon MD  [address] [address]  Via Fax: [phone]    Dear Nicki Colon MD,      Thank you for referring Mr. Rahat Braden to [address] [place] [address] for evaluation. My notes for this consultation are attached. If you have questions, please do not hesitate to call me. I look forward to following your patient along with you.       Sincerely,    Camilla Banda, DO

## 2021-09-08 NOTE — PROGRESS NOTES
Rey Montemayor is a 80 y.o. male  Chief Complaint   Patient presents with    Follow-up     Tonsil cancer     1. Have you been to the ER, urgent care clinic since your last visit? Hospitalized since your last visit? Yes, patient went to the ER for having a stroke. 2. Have you seen or consulted any other health care providers outside of the [address] [place] [address] since your last visit? Include any pap smears or colon screening. Yes, patient went to see  at [facility]. Patient is now fully vaccinated with the Arzate Peter covid-19 vaccine.

## 2021-09-08 NOTE — PROGRESS NOTES
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 Preeti Bond is a 80 y.o. male who presents for 12 mo follow-up of tonsil cancer. States had a stroke in 6/21. Seeing neuro now. Labs good in hospital.   Last CT neck 7/20. Saw ENT recently and good per pt. No H+N issues per pt today. States feels fine today. No fevers/ chills/ chest pain/ SOB/ nausea/ vomiting/diarrhea/           Last visit:    Prior pt of Dr Terese Hanks. Still sees ENT and rad/onc. Seeing ENT 9/20. To see Rad/onc again soon. Had CT 7/19 good. Pt is doing ok today. Has labs done by PCP/ ENT. No records here from them. Pt is here alone today. States not concerned about COVID. Wants to do CT this year. Otherwise, complete ROS is per the symptom report form which has been scanned into the media section of the electronic medical record. Past Medical History:   Diagnosis Date    GERD (gastroesophageal reflux disease)     Raynaud disease     Tonsil cancer ([facility]        Past Surgical History:   Procedure Laterality Date    HX GI      colonscopy    HX HEENT      HX OTHER SURGICAL      Bilateral Inguinal Hernias    HX TONSILLECTOMY      August 2016    HX UROLOGICAL      vasectomy    HX VASCULAR ACCESS         Allergies   Allergen Reactions    Pentazocine Other (comments)     Patient unsure of reaction    Talwin [Pentazocine Lactate] Unknown (comments)     Cant remember, happened 40 years ago       Current Outpatient Medications   Medication Sig Dispense Refill    aspirin 81 mg chewable tablet Take 1 Tablet by mouth daily. 90 Tablet 1    atorvastatin (LIPITOR) 80 mg tablet Take 1 Tablet by mouth nightly. 30 Tablet 1    amLODIPine (Norvasc) 5 mg tablet Take 1 Tablet by mouth daily.  27 Tablet 1    famotidine (PEPCID) 20 mg tablet Take 1 Tab by mouth.  diphenhydrAMINE-acetaminophen  mg tab Take 1 Tab by mouth.  ibuprofen (ADVIL) 200 mg tablet 200 mg.  polyethylene glycol (MIRALAX) 17 gram packet Take 1 Packet by mouth daily. 30 Packet 2    zolpidem (AMBIEN) 10 mg tablet Take 10 mg by mouth nightly as needed for Sleep.  meclizine (ANTIVERT) 25 mg tablet Take 1 Tab by mouth three (3) times daily as needed for Dizziness. (Patient not taking: Reported on 9/8/2021) 20 Tab 0    artificial saliva (MOUTH KOTE) spra Take 1 Spray by mouth as needed for Other. (Patient not taking: Reported on 9/8/2021) 240 mL 1    aluminum-magnesium hydroxide 200-200 mg/5 mL susp 30 mL, diphenhydrAMINE 12.5 mg/5 mL elix 75 mg, lidocaine 2 % soln 30 mL oral suspension (compounded) Take 5 mL by mouth Before breakfast, lunch, dinner and at bedtime. (Patient not taking: Reported on 9/8/2021) 1 Bottle 2       Social History     Socioeconomic History    Marital status:      Spouse name: Not on file    Number of children: Not on file    Years of education: Not on file    Highest education level: Not on file   Tobacco Use    Smoking status: Former Smoker    Smokeless tobacco: Never Used   Substance and Sexual Activity    Alcohol use: Yes     Social Determinants of Health     Financial Resource Strain:     Difficulty of Paying Living Expenses:    Food Insecurity:     Worried About Running Out of Food in the Last Year:     [address] in the Last Year:    Transportation Needs:     Lack of Transportation (Medical):      Lack of Transportation (Non-Medical):    Physical Activity:     Days of Exercise per Week:     Minutes of Exercise per Session:    Stress:     Feeling of Stress :    Social Connections:     Frequency of Communication with Friends and Family:     Frequency of Social Gatherings with Friends and Family:     Attends Temple Services:     Active Member of Clubs or Organizations:     Attends Club or Organization Meetings:     Marital Status:        Family History   Problem Relation Age of Onset    Dementia Mother     Parkinson's Disease Father        ROS  A complete review of systems was obtained, negative except as described above and as reported on ROS sheet scanned into system.        Physical Examination:   Visit Vitals  /68 (BP 1 Location: Left upper arm, BP Patient Position: Sitting)   Pulse 62   Temp 97.7 °F (36.5 °C) (Oral)   Ht 5' 9\" (1.753 m)   Wt 155 lb 6.4 oz (70.5 kg)   SpO2 97%   BMI 22.95 kg/m²     General appearance - alert, well appearing, and in no distress  Mental status - oriented to person, place, and time  Mouth - mucous membranes moist, no masses in tonsil area appreciated  Neck - supple, no adenopathy  Chest - clear to auscultation, no wheezes, rales or rhonchi, symmetric air entry  Heart - normal rate, regular rhythm  Abdomen - soft, nontender, nondistended  Neurological - normal speech, no focal findings   Musculoskeletal - no joint tenderness, deformity or swelling  Extremities -  no pedal edema, no clubbing or cyanosis  Skin - normal coloration and turgor, no rashes, no suspicious skin lesions noted    LABS  Per PCP  Lab Results   Component Value Date/Time    WBC 7.9 06/27/2021 05:03 AM    HGB 14.9 06/27/2021 05:03 AM    HCT 43.9 06/27/2021 05:03 AM    PLATELET 927 02/45/9237 05:03 AM    MCV 94.4 06/27/2021 05:03 AM     Lab Results   Component Value Date/Time    Sodium 138 06/27/2021 05:03 AM    Potassium 4.0 06/27/2021 05:03 AM    Chloride 108 06/27/2021 05:03 AM    CO2 24 06/27/2021 05:03 AM    Anion gap 6 06/27/2021 05:03 AM    Glucose 102 (H) 06/27/2021 05:03 AM    BUN 16 06/27/2021 05:03 AM    Creatinine 0.75 06/27/2021 05:03 AM    BUN/Creatinine ratio 21 (H) 06/27/2021 05:03 AM    GFR est AA >60 06/27/2021 05:03 AM    GFR est non-AA >60 06/27/2021 05:03 AM    Calcium 9.0 06/27/2021 05:03 AM    Bilirubin, total 0.2 06/25/2021 08:05 PM Alk. 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 Margins are close, but negative. T1.   FNA of left neck mass on 7/20/16 with squamous cell carcinoma. P16 positive. IMAGING      PET on 2/13/17 with no foci of abnormal hypermetabolism. There is a 1.5 cm cystic lesion projected off the body of the pancreas. PET on 8/2/16 with increased metabolic activity in the enlarged left level 2 lymph node with SUV of 11 suspicious for metastatic disease. There is minimal asymmetric increased activity in the left palatine tonsil compared to the right without an obvious mass. ASSESSMENT/ PLAN:     Mr. Sancho Pratt is a 80 y.o. 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 1. Stage 2 Tonsil cancer dx 7/16 hx of chemo/ XRT  Prior pt of Dr Flory Vasquez. Recent stroke. Doing ok seeing neuro. No H+ N issues. CT neck 7/20  Continues to see Dr. Sara Enrique ENT for f/u and rad/onc also. No sign of local recurrence. Pt wants to do CT neck again this year at [facility]. Ordered. Pt clinically doing well without any new issues. Labs per PCP    2. Recent stroke. Per neuro. 3. Pancreatic cyst.  Had an MRI 2017 and EUS under the care of Dr. Guero Henry. Had biopsies negative. Pt wants CT A/P added on to neck. 5. Back pain due to arthritis. Per PCP. 6.  Psychosocial.  Mood good. Support good from wife. Here alone today. Lives near [facility]. Follow up in 12 months, sooner if needed.    Call if questions    Eleazar Levi, DO

## 2021-09-23 ENCOUNTER — HOSPITAL ENCOUNTER (OUTPATIENT)
Dept: CT IMAGING | Age: 86
Discharge: HOME | End: 2021-09-23
Attending: INTERNAL MEDICINE
Payer: MEDICARE

## 2021-09-23 ENCOUNTER — HOSPITAL ENCOUNTER (OUTPATIENT)
Dept: CT IMAGING | Age: 86
End: 2021-09-23
Attending: INTERNAL MEDICINE
Payer: MEDICARE

## 2021-09-23 DIAGNOSIS — I63.9 CEREBROVASCULAR ACCIDENT (CVA), UNSPECIFIED MECHANISM (HCC): ICD-10-CM

## 2021-09-23 DIAGNOSIS — Z92.3 HISTORY OF THERAPEUTIC RADIATION: ICD-10-CM

## 2021-09-23 DIAGNOSIS — K86.89 PANCREATIC MASS: ICD-10-CM

## 2021-09-23 DIAGNOSIS — Z85.818 HISTORY OF CANCER TONSIL: ICD-10-CM

## 2021-09-23 DIAGNOSIS — Z92.21 HISTORY OF CANCER CHEMOTHERAPY: ICD-10-CM

## 2021-09-23 PROCEDURE — 74011000636 HC RX REV CODE- 636: Performed by: INTERNAL MEDICINE

## 2021-09-23 PROCEDURE — 74177 CT ABD & PELVIS W/CONTRAST: CPT

## 2021-09-23 PROCEDURE — 70491 CT SOFT TISSUE NECK W/DYE: CPT

## 2021-09-23 RX ADMIN — IOHEXOL 50 ML: 240 INJECTION, SOLUTION INTRATHECAL; INTRAVASCULAR; INTRAVENOUS; ORAL at 12:30

## 2021-09-23 RX ADMIN — IOPAMIDOL 75 ML: 612 INJECTION, SOLUTION INTRAVENOUS at 13:55

## 2021-09-23 RX ADMIN — IOPAMIDOL 75 ML: 612 INJECTION, SOLUTION INTRAVENOUS at 13:57

## 2021-09-24 DIAGNOSIS — J98.11 ATELECTASIS: ICD-10-CM

## 2021-09-24 DIAGNOSIS — Z85.818 HISTORY OF CANCER TONSIL: Primary | ICD-10-CM

## 2021-09-24 NOTE — PROGRESS NOTES
ERIC Verified. Called patient on mobile phone number listed. Patient was made aware of CT scan results/update in regards to Pancreatic Mass. Patient was very appreciative and verbalized understanding.   Agus Sequeira

## 2021-09-24 NOTE — PROGRESS NOTES
CT Chest ordered for follow up of atelectasis noted on CT A/P. Please let patient know - non urgent.

## 2021-09-29 NOTE — PROGRESS NOTES
MA attempted to reach patient off both mobile and home phone number listed in chart, no answer. Left a voicemail to give the office a call back.   Kathya Braga

## 2021-09-29 NOTE — PROGRESS NOTES
ERIC Verified. Patients wife, Prerna Han, called out office in regards to receiving results for patients CT A/P and getting more information to what Atelectasis means or what this does to a patient. Wife was informed of patients results and update on radiologist recommending CT chest to fu for this. She was made aware meaning of Atelectasis, symptoms, and tips on what patient can do to help meanwhile we wait for CT chest to be done. She was made aware this can usually be treated on its own but will be doing scan to further eval non urgent. Itzel Noble was very appreciative for keeping her up to date due to patient having issues with memory.   Wilmot Fothergill

## 2021-09-29 NOTE — PROGRESS NOTES
ERIC Verified. Patient gave the office a call back and was made aware of his CT a/p results and was advised we have ordered CT Chest for fu on Atelectasis. Patient verbalized understanding but wanted someone to call him back to explain more of what the symptoms are to this, what does he have to do to help him, and what is this into detail. Patient was very appreciative over call.   Cesar Dickey

## 2021-10-04 ENCOUNTER — TELEPHONE (OUTPATIENT)
Dept: ONCOLOGY | Age: 86
End: 2021-10-04

## 2021-10-04 NOTE — TELEPHONE ENCOUNTER
Patient's wife, [employer] [facility], called and left a voicemail in regards to pt's chest images. Please follow up.      #[phone]

## 2021-10-05 ENCOUNTER — TELEPHONE (OUTPATIENT)
Dept: ONCOLOGY | Age: 86
End: 2021-10-05

## 2021-10-05 NOTE — TELEPHONE ENCOUNTER
ERIC Verified. Called patients wife, Elaine Gomez, back off work phone number listed under her name in patients chart. Seda had stated she was calling just to ask if the scan they were needing to schedule was with or without contrast to see how long to expect to be there with patient due to her job. Seda was made aware when she scheduled patients scan for 10/11/21 that it is a CT scan with contrast. She was very appreciative and expressed no further question!   Dilcia Mcneil

## 2021-10-08 ENCOUNTER — TRANSCRIBE ORDER (OUTPATIENT)
Dept: SCHEDULING | Age: 86
End: 2021-10-08

## 2021-10-08 DIAGNOSIS — I69.90 UNSPECIFIED LATE EFFECTS OF CEREBROVASCULAR DISEASE: Primary | ICD-10-CM

## 2021-10-11 ENCOUNTER — HOSPITAL ENCOUNTER (OUTPATIENT)
Dept: CT IMAGING | Age: 86
Discharge: HOME | End: 2021-10-11
Attending: NURSE PRACTITIONER
Payer: MEDICARE

## 2021-10-11 DIAGNOSIS — Z85.818 HISTORY OF CANCER TONSIL: ICD-10-CM

## 2021-10-11 DIAGNOSIS — J98.11 ATELECTASIS: ICD-10-CM

## 2021-10-11 PROCEDURE — 74011000636 HC RX REV CODE- 636: Performed by: NURSE PRACTITIONER

## 2021-10-11 PROCEDURE — 71260 CT THORAX DX C+: CPT

## 2021-10-11 RX ADMIN — IOPAMIDOL 100 ML: 612 INJECTION, SOLUTION INTRAVENOUS at 09:00

## 2021-10-11 NOTE — PROGRESS NOTES
Tell pt CT chest is ok  Calcified lung nodule/ likely granuloma/ not cancer  Will need fu in 3-6 months

## 2021-10-14 NOTE — PROGRESS NOTES
Call to patient on home number, phone rang with no answer. Call to mobile number listed, received patient's self identifying VM. Provider message relayed, Provided office telephone number to schedule recommended 3-6 month follow up or to contact RN with questions about scan.

## 2021-10-19 ENCOUNTER — TELEPHONE (OUTPATIENT)
Dept: ONCOLOGY | Age: 86
End: 2021-10-19

## 2021-10-19 NOTE — TELEPHONE ENCOUNTER
Returned wife's call, [phone], Patient ID verified X 2. Per Seda, patient did not recall RN message regarding scans, calling for results. Provider message relayed regarding CT scan/granuloma. Understands will need to follow up with Provider in 3-6 months. States will plan appt for 03/2022. Update to Provider.

## 2021-10-19 NOTE — TELEPHONE ENCOUNTER
Seda called and left a message explaining that Jayson Arango had gotten an x-ray done, but have not heard any results yet.      Please follow up    # [phone]

## 2021-10-28 ENCOUNTER — HOSPITAL ENCOUNTER (OUTPATIENT)
Dept: ULTRASOUND IMAGING | Age: 86
Discharge: HOME | End: 2021-10-28
Attending: PSYCHIATRY & NEUROLOGY
Payer: MEDICARE

## 2021-10-28 DIAGNOSIS — Z85.818 HISTORY OF CANCER TONSIL: Primary | ICD-10-CM

## 2021-10-28 DIAGNOSIS — R91.1 PULMONARY NODULE, RIGHT: ICD-10-CM

## 2021-10-28 DIAGNOSIS — I69.90 UNSPECIFIED LATE EFFECTS OF CEREBROVASCULAR DISEASE: ICD-10-CM

## 2021-10-28 LAB
LEFT CCA DIST DIAS: 15.6 CM/S
LEFT CCA DIST SYS: 72.7 CM/S
LEFT CCA PROX DIAS: 15.6 CM/S
LEFT CCA PROX SYS: 78.7 CM/S
LEFT ECA DIAS: 10.37 CM/S
LEFT ECA SYS: 97.5 CM/S
LEFT ICA DIST DIAS: 17.9 CM/S
LEFT ICA DIST SYS: 67.5 CM/S
LEFT ICA MID DIAS: 19.4 CM/S
LEFT ICA MID SYS: 64.5 CM/S
LEFT ICA PROX DIAS: 8.9 CM/S
LEFT ICA PROX SYS: 67.5 CM/S
LEFT ICA/CCA SYS: 0.93
LEFT VERTEBRAL DIAS: 6.48 CM/S
LEFT VERTEBRAL SYS: 27.4 CM/S
RIGHT CCA DIST DIAS: 9 CM/S
RIGHT CCA DIST SYS: 59.9 CM/S
RIGHT CCA PROX DIAS: 11.6 CM/S
RIGHT CCA PROX SYS: 69 CM/S
RIGHT ECA DIAS: 8.22 CM/S
RIGHT ECA SYS: 86.3 CM/S
RIGHT ICA DIST DIAS: 11.9 CM/S
RIGHT ICA DIST SYS: 54.2 CM/S
RIGHT ICA MID DIAS: 18 CM/S
RIGHT ICA MID SYS: 70.9 CM/S
RIGHT ICA PROX DIAS: 11 CM/S
RIGHT ICA PROX SYS: 51.4 CM/S
RIGHT ICA/CCA SYS: 1.2
RIGHT VERTEBRAL DIAS: 13.2 CM/S
RIGHT VERTEBRAL SYS: 56.5 CM/S

## 2021-10-28 PROCEDURE — 93880 EXTRACRANIAL BILAT STUDY: CPT

## 2021-10-29 ENCOUNTER — TELEPHONE (OUTPATIENT)
Dept: ONCOLOGY | Age: 86
End: 2021-10-29

## 2021-10-29 NOTE — TELEPHONE ENCOUNTER
Patient's wife called because  got a message from our office about getting a second scan. Please follow up.    # [phone]

## 2021-11-01 NOTE — TELEPHONE ENCOUNTER
Returned call to patient's wife, [phone] X [phone], patient ID verified X 2. Updated wife that scheduling call had been for repeat Chest CT that Provider recommends having done in 3-6 months to monitor calcified lung nodule/granuloma seen on 10/11/21 scan. Supplied wife with PRATIBHA phone number to schedule scan. Understanding verbalized of all. Update to Provider.

## 2022-03-19 PROBLEM — I63.9 STROKE (CEREBRUM) (HCC): Status: ACTIVE | Noted: 2021-06-26

## 2022-03-19 PROBLEM — Z92.21 HISTORY OF CANCER CHEMOTHERAPY: Status: ACTIVE | Noted: 2018-01-25

## 2022-03-19 PROBLEM — Z85.818 HISTORY OF CANCER TONSIL: Status: ACTIVE | Noted: 2018-07-30

## 2022-03-19 PROBLEM — Z92.3 HISTORY OF THERAPEUTIC RADIATION: Status: ACTIVE | Noted: 2018-01-25

## 2022-05-31 ENCOUNTER — TRANSCRIBE ORDER (OUTPATIENT)
Dept: SCHEDULING | Age: 87
End: 2022-05-31

## 2022-05-31 ENCOUNTER — TELEPHONE (OUTPATIENT)
Dept: ONCOLOGY | Age: 87
End: 2022-05-31

## 2022-05-31 DIAGNOSIS — R22.1 NECK MASS: Primary | ICD-10-CM

## 2022-05-31 NOTE — TELEPHONE ENCOUNTER
ERIC Verified. Called back patients wife off phone number left to reach her. Wife was advised per Provider that the order for patient to have a CT chest scan had already been ordered back in the fall 2021. She verbalized understanding and was given the scheduling dept phone number to go ahead and get these scans scheduled for both  and  to see if patient may have these done on the same day. She was very appreciative over call!   Rojas Rodriguez

## 2022-05-31 NOTE — TELEPHONE ENCOUNTER
Pt's wife Sukhdev Mott is calling to request a CT or MRI order based off a conversation previously had in the fall.        CB# is [phone]

## 2022-06-02 ENCOUNTER — HOSPITAL ENCOUNTER (OUTPATIENT)
Dept: CT IMAGING | Age: 87
Discharge: HOME | End: 2022-06-02
Attending: OTOLARYNGOLOGY
Payer: MEDICARE

## 2022-06-02 DIAGNOSIS — R91.1 PULMONARY NODULE, RIGHT: ICD-10-CM

## 2022-06-02 DIAGNOSIS — Z85.818 HISTORY OF CANCER TONSIL: ICD-10-CM

## 2022-06-02 DIAGNOSIS — R22.1 NECK MASS: ICD-10-CM

## 2022-06-02 LAB
BUN SERPL-MCNC: 18 MG/DL (ref 6–20)
CREAT SERPL-MCNC: 0.87 MG/DL (ref 0.7–1.3)

## 2022-06-02 PROCEDURE — 82565 ASSAY OF CREATININE: CPT

## 2022-06-02 PROCEDURE — 70491 CT SOFT TISSUE NECK W/DYE: CPT

## 2022-06-02 PROCEDURE — 84520 ASSAY OF UREA NITROGEN: CPT

## 2022-06-02 PROCEDURE — 74011000636 HC RX REV CODE- 636: Performed by: OTOLARYNGOLOGY

## 2022-06-02 PROCEDURE — 71260 CT THORAX DX C+: CPT

## 2022-06-02 PROCEDURE — 36415 COLL VENOUS BLD VENIPUNCTURE: CPT

## 2022-06-02 RX ADMIN — IOPAMIDOL 75 ML: 612 INJECTION, SOLUTION INTRAVENOUS at 09:54

## 2022-06-02 RX ADMIN — IOPAMIDOL 75 ML: 612 INJECTION, SOLUTION INTRAVENOUS at 09:59

## 2022-06-03 NOTE — PROGRESS NOTES
ERIC Verified. Called pt on home phone number listed. Patient was made aware per Provider that his recent CT scans looked stable/good. Patient stated he was told these results by Lillian Rogers but was still appreciative over update. Patient is requesting  call his wife's phone number in patients chart: [phone] ext. [phone] to make a fu appt for him.   Katie Koch

## 2022-06-28 NOTE — TELEPHONE ENCOUNTER
HIPPA verified. Patient informed of 4/25/17 lab results per NP message. Patient verbalized understanding and asked if her could have a copy of his labs faxed to [phone]; Labs faxed to number provided by patient. Faxed failed; called patient to reconfirm fax number; same fax number given; failed. Writer will e-mail patient with e-mail address provided; patient also asked for e-mail address not to be apart of his chart.
Labs from 4/25/17 are fine, stable.
Patient called again stating he would like to speak with Dr. Drake Wilson or Priscilla Chaudhry regarding his labs.  Please advise [phone]
Patient called to speak to Dyllan Mercer regarding lab results.
Patient called wanting to go over recent lab work, patient called twice yesterday and again this morning.  Please call patient at [phone]
no

## 2022-09-08 ENCOUNTER — TELEPHONE (OUTPATIENT)
Dept: ONCOLOGY | Age: 87
End: 2022-09-08

## 2022-09-08 NOTE — TELEPHONE ENCOUNTER
Shahana Jean, the wife of the patient, called to cancel this morning's appointment and would like a call back from the doctor to discuss  a few concerns the patient has. Call back number is [phone].

## 2022-09-08 NOTE — TELEPHONE ENCOUNTER
Returned call to patient's wife, Brian Sanchez, [phone]. Patient ID verified. Per wife, patient is having no current issues or symptoms of recurrence. Patient had CVA in 6/2022 that left him with short term memory deficits and he could not remember today what his appt was for. Chart reviewed, arcadios was annual follow up. Reports that patient has lingering firmness in area of prior XRT that PCP has patient massage, but no other issues. States will reschedule if Provider feels need, but does not wish to make long drive. Willing to do phone call. Also states lives near \[facility]\"" and could do virtual visit from there if needed. Jin RN will provide update after Provider review.

## 2022-09-09 NOTE — TELEPHONE ENCOUNTER
Follow up call to patient and wife, ID verified X 2. Provider message relayed to spouse. Patient has not had recurrence and has 2 hr trip to [address], will follow up with PCP and with Provider here in office if PCP recommends. Appreciative of call. Update to Provider.

## 2023-01-19 NOTE — PROGRESS NOTES
CT Chest ordered today to be done in 3-6 months to follow up on calcified lung nodule. Ivermectin Counseling:  Patient instructed to take medication on an empty stomach with a full glass of water.  Patient informed of potential adverse effects including but not limited to nausea, diarrhea, dizziness, itching, and swelling of the extremities or lymph nodes.  The patient verbalized understanding of the proper use and possible adverse effects of ivermectin.  All of the patient's questions and concerns were addressed.

## 2024-06-30 ENCOUNTER — HOSPITAL ENCOUNTER (OUTPATIENT)
Facility: HOSPITAL | Age: 89
Setting detail: OBSERVATION
Discharge: HOME | End: 2024-07-02
Attending: EMERGENCY MEDICINE | Admitting: INTERNAL MEDICINE
Payer: MEDICARE

## 2024-06-30 ENCOUNTER — APPOINTMENT (OUTPATIENT)
Facility: HOSPITAL | Age: 89
End: 2024-06-30
Payer: MEDICARE

## 2024-06-30 DIAGNOSIS — I63.511 CEREBROVASCULAR ACCIDENT (CVA) DUE TO OCCLUSION OF RIGHT MIDDLE CEREBRAL ARTERY (HCC): ICD-10-CM

## 2024-06-30 DIAGNOSIS — R42 DIZZINESS: Primary | ICD-10-CM

## 2024-06-30 LAB
ALBUMIN SERPL-MCNC: 3.9 G/DL (ref 3.5–5)
ALBUMIN/GLOB SERPL: 1.3 (ref 1.1–2.2)
ALP SERPL-CCNC: 87 U/L (ref 45–117)
ALT SERPL-CCNC: 19 U/L (ref 12–78)
ANION GAP SERPL CALC-SCNC: 6 MMOL/L (ref 5–15)
AST SERPL-CCNC: 21 U/L (ref 15–37)
BASOPHILS # BLD: 0 K/UL (ref 0–0.1)
BASOPHILS NFR BLD: 0 % (ref 0–1)
BILIRUB SERPL-MCNC: 0.5 MG/DL (ref 0.2–1)
BUN SERPL-MCNC: 25 MG/DL (ref 6–20)
BUN/CREAT SERPL: 22 (ref 12–20)
CALCIUM SERPL-MCNC: 9.2 MG/DL (ref 8.5–10.1)
CHLORIDE SERPL-SCNC: 106 MMOL/L (ref 97–108)
CO2 SERPL-SCNC: 32 MMOL/L (ref 21–32)
CREAT SERPL-MCNC: 1.13 MG/DL (ref 0.7–1.3)
DIFFERENTIAL METHOD BLD: ABNORMAL
EOSINOPHIL # BLD: 0 K/UL (ref 0–0.4)
EOSINOPHIL NFR BLD: 0 % (ref 0–7)
ERYTHROCYTE [DISTWIDTH] IN BLOOD BY AUTOMATED COUNT: 13.9 % (ref 11.5–14.5)
ETHANOL SERPL-MCNC: <10 MG/DL (ref 0–0.08)
GLOBULIN SER CALC-MCNC: 3 G/DL (ref 2–4)
GLUCOSE BLD STRIP.AUTO-MCNC: 99 MG/DL (ref 65–117)
GLUCOSE SERPL-MCNC: 100 MG/DL (ref 65–100)
HCT VFR BLD AUTO: 39.8 % (ref 36.6–50.3)
HGB BLD-MCNC: 14 G/DL (ref 12.1–17)
IMM GRANULOCYTES # BLD AUTO: 0 K/UL (ref 0–0.04)
IMM GRANULOCYTES NFR BLD AUTO: 0 % (ref 0–0.5)
INR PPP: 1 (ref 0.9–1.1)
LYMPHOCYTES # BLD: 3.5 K/UL (ref 0.8–3.5)
LYMPHOCYTES NFR BLD: 24 % (ref 12–49)
MAGNESIUM SERPL-MCNC: 1.9 MG/DL (ref 1.6–2.4)
MCH RBC QN AUTO: 31.8 PG (ref 26–34)
MCHC RBC AUTO-ENTMCNC: 35.2 G/DL (ref 30–36.5)
MCV RBC AUTO: 90.5 FL (ref 80–99)
MONOCYTES # BLD: 1.1 K/UL (ref 0–1)
MONOCYTES NFR BLD: 8 % (ref 5–13)
NEUTS SEG # BLD: 9.8 K/UL (ref 1.8–8)
NEUTS SEG NFR BLD: 68 % (ref 32–75)
NRBC # BLD: 0 K/UL (ref 0–0.01)
NRBC BLD-RTO: 0 PER 100 WBC
PLATELET # BLD AUTO: 330 K/UL (ref 150–400)
PMV BLD AUTO: 9.8 FL (ref 8.9–12.9)
POTASSIUM SERPL-SCNC: 3.8 MMOL/L (ref 3.5–5.1)
PROT SERPL-MCNC: 6.9 G/DL (ref 6.4–8.2)
PROTHROMBIN TIME: 9.9 SEC (ref 9–11.1)
RBC # BLD AUTO: 4.4 M/UL (ref 4.1–5.7)
SERVICE CMNT-IMP: NORMAL
SODIUM SERPL-SCNC: 144 MMOL/L (ref 136–145)
WBC # BLD AUTO: 14.6 K/UL (ref 4.1–11.1)

## 2024-06-30 PROCEDURE — 99285 EMERGENCY DEPT VISIT HI MDM: CPT

## 2024-06-30 PROCEDURE — 96372 THER/PROPH/DIAG INJ SC/IM: CPT

## 2024-06-30 PROCEDURE — 6360000002 HC RX W HCPCS: Performed by: INTERNAL MEDICINE

## 2024-06-30 PROCEDURE — 85025 COMPLETE CBC W/AUTO DIFF WBC: CPT

## 2024-06-30 PROCEDURE — 2580000003 HC RX 258: Performed by: INTERNAL MEDICINE

## 2024-06-30 PROCEDURE — 6370000000 HC RX 637 (ALT 250 FOR IP): Performed by: INTERNAL MEDICINE

## 2024-06-30 PROCEDURE — G0378 HOSPITAL OBSERVATION PER HR: HCPCS

## 2024-06-30 PROCEDURE — 71045 X-RAY EXAM CHEST 1 VIEW: CPT

## 2024-06-30 PROCEDURE — 82962 GLUCOSE BLOOD TEST: CPT

## 2024-06-30 PROCEDURE — 70450 CT HEAD/BRAIN W/O DYE: CPT

## 2024-06-30 PROCEDURE — 96374 THER/PROPH/DIAG INJ IV PUSH: CPT

## 2024-06-30 PROCEDURE — 85610 PROTHROMBIN TIME: CPT

## 2024-06-30 PROCEDURE — 83735 ASSAY OF MAGNESIUM: CPT

## 2024-06-30 PROCEDURE — 93005 ELECTROCARDIOGRAM TRACING: CPT | Performed by: EMERGENCY MEDICINE

## 2024-06-30 PROCEDURE — 82077 ASSAY SPEC XCP UR&BREATH IA: CPT

## 2024-06-30 PROCEDURE — 36415 COLL VENOUS BLD VENIPUNCTURE: CPT

## 2024-06-30 PROCEDURE — 80053 COMPREHEN METABOLIC PANEL: CPT

## 2024-06-30 RX ORDER — ACETAMINOPHEN 325 MG/1
650 TABLET ORAL EVERY 4 HOURS PRN
Status: DISCONTINUED | OUTPATIENT
Start: 2024-06-30 | End: 2024-07-02 | Stop reason: HOSPADM

## 2024-06-30 RX ORDER — ASPIRIN 300 MG/1
300 SUPPOSITORY RECTAL DAILY
Status: DISCONTINUED | OUTPATIENT
Start: 2024-06-30 | End: 2024-07-02 | Stop reason: HOSPADM

## 2024-06-30 RX ORDER — SODIUM CHLORIDE 9 MG/ML
INJECTION, SOLUTION INTRAVENOUS CONTINUOUS
Status: DISCONTINUED | OUTPATIENT
Start: 2024-06-30 | End: 2024-07-02 | Stop reason: HOSPADM

## 2024-06-30 RX ORDER — LORAZEPAM 1 MG/1
4 TABLET ORAL
Status: DISCONTINUED | OUTPATIENT
Start: 2024-06-30 | End: 2024-07-02 | Stop reason: HOSPADM

## 2024-06-30 RX ORDER — ASPIRIN 81 MG/1
81 TABLET, CHEWABLE ORAL DAILY
Status: DISCONTINUED | OUTPATIENT
Start: 2024-06-30 | End: 2024-07-02 | Stop reason: HOSPADM

## 2024-06-30 RX ORDER — ATORVASTATIN CALCIUM 40 MG/1
80 TABLET, FILM COATED ORAL NIGHTLY
Status: DISCONTINUED | OUTPATIENT
Start: 2024-06-30 | End: 2024-07-02 | Stop reason: HOSPADM

## 2024-06-30 RX ORDER — ONDANSETRON 2 MG/ML
4 INJECTION INTRAMUSCULAR; INTRAVENOUS EVERY 6 HOURS PRN
Status: DISCONTINUED | OUTPATIENT
Start: 2024-06-30 | End: 2024-07-02 | Stop reason: HOSPADM

## 2024-06-30 RX ORDER — POLYETHYLENE GLYCOL 3350 17 G
2 POWDER IN PACKET (EA) ORAL
Status: DISCONTINUED | OUTPATIENT
Start: 2024-06-30 | End: 2024-07-02 | Stop reason: HOSPADM

## 2024-06-30 RX ORDER — ENOXAPARIN SODIUM 100 MG/ML
40 INJECTION SUBCUTANEOUS EVERY 24 HOURS
Status: DISCONTINUED | OUTPATIENT
Start: 2024-06-30 | End: 2024-07-02 | Stop reason: HOSPADM

## 2024-06-30 RX ORDER — AMLODIPINE BESYLATE 5 MG/1
5 TABLET ORAL DAILY
Status: DISCONTINUED | OUTPATIENT
Start: 2024-06-30 | End: 2024-07-02 | Stop reason: HOSPADM

## 2024-06-30 RX ORDER — ACETAMINOPHEN 650 MG/1
650 SUPPOSITORY RECTAL EVERY 4 HOURS PRN
Status: DISCONTINUED | OUTPATIENT
Start: 2024-06-30 | End: 2024-07-02 | Stop reason: HOSPADM

## 2024-06-30 RX ORDER — LORAZEPAM 1 MG/1
1 TABLET ORAL
Status: DISCONTINUED | OUTPATIENT
Start: 2024-06-30 | End: 2024-07-02 | Stop reason: HOSPADM

## 2024-06-30 RX ORDER — SODIUM CHLORIDE 9 MG/ML
INJECTION, SOLUTION INTRAVENOUS PRN
Status: DISCONTINUED | OUTPATIENT
Start: 2024-06-30 | End: 2024-07-02 | Stop reason: HOSPADM

## 2024-06-30 RX ORDER — M-VIT,TX,IRON,MINS/CALC/FOLIC 27MG-0.4MG
1 TABLET ORAL DAILY
Status: DISCONTINUED | OUTPATIENT
Start: 2024-06-30 | End: 2024-07-02 | Stop reason: HOSPADM

## 2024-06-30 RX ORDER — FAMOTIDINE 20 MG/1
20 TABLET, FILM COATED ORAL DAILY
Status: DISCONTINUED | OUTPATIENT
Start: 2024-06-30 | End: 2024-07-02 | Stop reason: HOSPADM

## 2024-06-30 RX ORDER — THIAMINE HYDROCHLORIDE 100 MG/ML
100 INJECTION, SOLUTION INTRAMUSCULAR; INTRAVENOUS DAILY
Status: DISCONTINUED | OUTPATIENT
Start: 2024-06-30 | End: 2024-07-01 | Stop reason: CLARIF

## 2024-06-30 RX ORDER — ONDANSETRON 4 MG/1
4 TABLET, ORALLY DISINTEGRATING ORAL EVERY 8 HOURS PRN
Status: DISCONTINUED | OUTPATIENT
Start: 2024-06-30 | End: 2024-07-02 | Stop reason: HOSPADM

## 2024-06-30 RX ORDER — SODIUM CHLORIDE 0.9 % (FLUSH) 0.9 %
5-40 SYRINGE (ML) INJECTION PRN
Status: DISCONTINUED | OUTPATIENT
Start: 2024-06-30 | End: 2024-07-02 | Stop reason: HOSPADM

## 2024-06-30 RX ORDER — SODIUM CHLORIDE 0.9 % (FLUSH) 0.9 %
5-40 SYRINGE (ML) INJECTION EVERY 12 HOURS SCHEDULED
Status: DISCONTINUED | OUTPATIENT
Start: 2024-06-30 | End: 2024-07-02 | Stop reason: HOSPADM

## 2024-06-30 RX ORDER — LORAZEPAM 1 MG/1
2 TABLET ORAL
Status: DISCONTINUED | OUTPATIENT
Start: 2024-06-30 | End: 2024-07-02 | Stop reason: HOSPADM

## 2024-06-30 RX ORDER — LABETALOL HYDROCHLORIDE 5 MG/ML
10 INJECTION, SOLUTION INTRAVENOUS EVERY 10 MIN PRN
Status: DISCONTINUED | OUTPATIENT
Start: 2024-06-30 | End: 2024-07-02 | Stop reason: HOSPADM

## 2024-06-30 RX ORDER — LORAZEPAM 1 MG/1
3 TABLET ORAL
Status: DISCONTINUED | OUTPATIENT
Start: 2024-06-30 | End: 2024-07-02 | Stop reason: HOSPADM

## 2024-06-30 RX ORDER — POLYETHYLENE GLYCOL 3350 17 G/17G
17 POWDER, FOR SOLUTION ORAL DAILY PRN
Status: DISCONTINUED | OUTPATIENT
Start: 2024-06-30 | End: 2024-07-01

## 2024-06-30 RX ORDER — FOLIC ACID 1 MG/1
1 TABLET ORAL DAILY
Status: DISCONTINUED | OUTPATIENT
Start: 2024-06-30 | End: 2024-07-02 | Stop reason: HOSPADM

## 2024-06-30 RX ADMIN — SODIUM CHLORIDE: 9 INJECTION, SOLUTION INTRAVENOUS at 21:01

## 2024-06-30 RX ADMIN — AMLODIPINE BESYLATE 5 MG: 5 TABLET ORAL at 21:06

## 2024-06-30 RX ADMIN — FOLIC ACID 1 MG: 1 TABLET ORAL at 21:06

## 2024-06-30 RX ADMIN — FAMOTIDINE 20 MG: 20 TABLET, FILM COATED ORAL at 21:06

## 2024-06-30 RX ADMIN — ATORVASTATIN CALCIUM 80 MG: 40 TABLET, FILM COATED ORAL at 21:05

## 2024-06-30 RX ADMIN — THIAMINE HYDROCHLORIDE 100 MG: 100 INJECTION, SOLUTION INTRAMUSCULAR; INTRAVENOUS at 21:03

## 2024-06-30 RX ADMIN — ENOXAPARIN SODIUM 40 MG: 100 INJECTION SUBCUTANEOUS at 21:03

## 2024-06-30 ASSESSMENT — LIFESTYLE VARIABLES
HOW MANY STANDARD DRINKS CONTAINING ALCOHOL DO YOU HAVE ON A TYPICAL DAY: PATIENT DOES NOT DRINK
HOW OFTEN DO YOU HAVE A DRINK CONTAINING ALCOHOL: NEVER

## 2024-06-30 ASSESSMENT — PAIN - FUNCTIONAL ASSESSMENT: PAIN_FUNCTIONAL_ASSESSMENT: 0-10

## 2024-06-30 ASSESSMENT — PAIN SCALES - GENERAL
PAINLEVEL_OUTOF10: 0
PAINLEVEL_OUTOF10: 0

## 2024-06-30 NOTE — H&P
Is      [facility]   Admission History & Physical        6/30/2024 6:08 PM  Patient: Aubrey Bell III 1935  PCP: Turner Cid MD    HISTORY  Chief Complaint/Reason for admission:   Chief Complaint   Patient presents with    Dizziness     Subjective:      HPI: 88 y.o. male presented for admission to [facility].  He reports 2D of dizziness, with ataxic type symptoms feeling off balance.  In general, he is not tending to fall 1 where the other but is \"swaying\", this acutely worsened today.  He is denying tinnitus, visual changes, facial numbness weakness word finding issues, or focal weakness.  He has a previous history of right temporal CVA see below in 2021 with now resolved left facial and left arm tingling weakness.  This is a very vigorous octogenarian who walks at least a mile and a half every day with a large dog, last smoked cigars 5 years PTA, drinks 2 scotches a day.    ED E/M: Hemodynamically stable, ethanol level less than 10, white count 14.6 head CT shows a reported chronic right occipital infarction.  ECG shows no ischemia sinus bradycardia.  Stroke score was 0, neurology not consulted TNK not given.      Medical history includes anemia, stroke 2021 with now resolved left facial and left arm tingling and weakness, secondary to acute right temporal lobe CVA..  He has been followed by neurology in this regard, last visit 3 months PTA.  Carotid duplex showed mild heterogenous plaques both ICAs less than 50% stenosis.  Echo showed EF 60 to 65% mild LVH mild AVR.  Tonsillar cancer status post tonsillectomy, radiation, and chemotherapy 2018, with chemotherapy induced induced peripheral neuropathy.      Pertinent past medical/surgical/social and family history were reviewed by myself and incorporated into my decision making as appropriate    Past Medical History:  Past Medical History:   Diagnosis Date    GERD (gastroesophageal reflux disease)     Raynaud disease     Tonsil cancer (HCC)

## 2024-06-30 NOTE — ED PROVIDER NOTES
[facility] EMERGENCY DEP  EMERGENCY DEPARTMENT ENCOUNTER       Pt Name: Aubrey Bell III  MRN: [id number]  Birthdate 1935  Date of evaluation: 6/30/2024  Provider: Viviane Shah MD   PCP: Turner Cid MD  Note Started: 6:04 PM EDT 6/30/24     CHIEF COMPLAINT       Chief Complaint   Patient presents with    Dizziness        HISTORY OF PRESENT ILLNESS: 1 or more elements      History From: patient, History limited by: none     Aubrey Bell III is a 88 y.o. male presents to the emergency department complaining of.  Patient reports has been feeling dizzy for the last 2 days.  Reports he has been feeling off balance mostly with walking.  States that he took a nap today and woke up, had sudden worsening of dizziness that he has been feeling for the last 2 days.  Reports he was having difficulty walking and felt unsteady.  No vomiting.  No room spinning sensation.  No history of similar symptoms.  Reports history of prior CVA in which she had arm weakness, transient.         Please See MDM for Additional Details of the HPI/PMH  Nursing Notes were all reviewed and agreed with or any disagreements were addressed in the HPI.     REVIEW OF SYSTEMS        Positives and Pertinent negatives as per HPI.    PAST HISTORY     Past Medical History:  Past Medical History:   Diagnosis Date    GERD (gastroesophageal reflux disease)     Raynaud disease     Tonsil cancer (HCC)        Past Surgical History:  Past Surgical History:   Procedure Laterality Date    GI      colonscopy    HEENT      OTHER SURGICAL HISTORY      Bilateral Inguinal Hernias    TONSILLECTOMY      August 2016    UROLOGICAL SURGERY      vasectomy    VASCULAR SURGERY         Family History:  Family History   Problem Relation Age of Onset    Parkinson's Disease Father     Dementia Mother        Social History:  Social History     Tobacco Use    Smoking status: Former    Smokeless tobacco: Never   Substance Use Topics    Alcohol use: Yes

## 2024-06-30 NOTE — ED TRIAGE NOTES
Pt arrived with complaint of dizziness.  Per pt he has been dizzy with unsteady gait for 2 days, pt report he has been walking his Amharic moran 3 times a day and was outside today.  Pt reports he laid down for a nap and when he woke up he was feeling like crap and his dizziness was worse.  Pt reports he drinks alcohol everyday but has not had any alcohol today.  No tremors noted  pt is awake alert and oriented X 4, speaking in full complete sentences  NAD.pt with ataxia with walking and needed one person assist.  Pt and wife educated on ER flow.  Fingerstick 99

## 2024-07-01 ENCOUNTER — APPOINTMENT (OUTPATIENT)
Facility: HOSPITAL | Age: 89
End: 2024-07-01
Payer: MEDICARE

## 2024-07-01 ENCOUNTER — HOSPITAL ENCOUNTER (OUTPATIENT)
Facility: HOSPITAL | Age: 89
Setting detail: OBSERVATION
Discharge: HOME | End: 2024-07-04
Payer: MEDICARE

## 2024-07-01 PROBLEM — I67.9 CVD (CEREBROVASCULAR DISEASE): Chronic | Status: ACTIVE | Noted: 2021-06-26

## 2024-07-01 PROBLEM — Z78.9 ALCOHOL USE: Status: ACTIVE | Noted: 2024-07-01

## 2024-07-01 PROBLEM — I67.9 CVD (CEREBROVASCULAR DISEASE): Status: ACTIVE | Noted: 2021-06-26

## 2024-07-01 PROBLEM — R27.0 ATAXIA: Chronic | Status: ACTIVE | Noted: 2024-07-01

## 2024-07-01 PROBLEM — R27.0 ATAXIA: Status: ACTIVE | Noted: 2024-07-01

## 2024-07-01 PROBLEM — F10.90 ALCOHOL USE: Status: ACTIVE | Noted: 2024-07-01

## 2024-07-01 PROBLEM — E03.9 HYPOTHYROIDISM: Chronic | Status: ACTIVE | Noted: 2024-07-01

## 2024-07-01 PROBLEM — T45.1X5A CHEMOTHERAPY-INDUCED PERIPHERAL NEUROPATHY (HCC): Status: ACTIVE | Noted: 2024-07-01

## 2024-07-01 PROBLEM — I10 HYPERTENSION: Chronic | Status: ACTIVE | Noted: 2024-07-01

## 2024-07-01 PROBLEM — G62.0 CHEMOTHERAPY-INDUCED PERIPHERAL NEUROPATHY (HCC): Chronic | Status: ACTIVE | Noted: 2024-07-01

## 2024-07-01 PROBLEM — Z78.9 ALCOHOL USE: Chronic | Status: ACTIVE | Noted: 2024-07-01

## 2024-07-01 PROBLEM — E78.5 HYPERLIPIDEMIA: Chronic | Status: ACTIVE | Noted: 2024-07-01

## 2024-07-01 PROBLEM — T45.1X5A CHEMOTHERAPY-INDUCED PERIPHERAL NEUROPATHY (HCC): Chronic | Status: ACTIVE | Noted: 2024-07-01

## 2024-07-01 PROBLEM — F10.90 ALCOHOL USE: Chronic | Status: ACTIVE | Noted: 2024-07-01

## 2024-07-01 PROBLEM — G62.0 CHEMOTHERAPY-INDUCED PERIPHERAL NEUROPATHY (HCC): Status: ACTIVE | Noted: 2024-07-01

## 2024-07-01 LAB
ANION GAP SERPL CALC-SCNC: 5 MMOL/L (ref 5–15)
BASOPHILS # BLD: 0.1 K/UL (ref 0–0.1)
BASOPHILS NFR BLD: 1 % (ref 0–1)
BUN SERPL-MCNC: 23 MG/DL (ref 6–20)
BUN/CREAT SERPL: 24 (ref 12–20)
CALCIUM SERPL-MCNC: 8.3 MG/DL (ref 8.5–10.1)
CHLORIDE SERPL-SCNC: 105 MMOL/L (ref 97–108)
CHOLEST SERPL-MCNC: 103 MG/DL
CO2 SERPL-SCNC: 32 MMOL/L (ref 21–32)
CREAT SERPL-MCNC: 0.96 MG/DL (ref 0.7–1.3)
DIFFERENTIAL METHOD BLD: ABNORMAL
ECHO AO ASC DIAM: 3.4 CM
ECHO AO ASCENDING AORTA INDEX: 1.89 CM/M2
ECHO AO ROOT DIAM: 3.5 CM
ECHO AO ROOT INDEX: 1.94 CM/M2
ECHO AR MAX VEL PISA: 4.1 M/S
ECHO AV PEAK GRADIENT: 6 MMHG
ECHO AV PEAK VELOCITY: 1.3 M/S
ECHO AV REGURGITANT PHT: 584.2 MILLISECOND
ECHO BSA: 1.78 M2
ECHO EST RA PRESSURE: 3 MMHG
ECHO LA DIAMETER INDEX: 2 CM/M2
ECHO LA DIAMETER: 3.6 CM
ECHO LA TO AORTIC ROOT RATIO: 1.03
ECHO LA VOL A-L A2C: 74 ML (ref 18–58)
ECHO LA VOL A-L A4C: 70 ML (ref 18–58)
ECHO LA VOL MOD A2C: 70 ML (ref 18–58)
ECHO LA VOL MOD A4C: 64 ML (ref 18–58)
ECHO LA VOLUME AREA LENGTH: 76 ML
ECHO LA VOLUME INDEX A-L A2C: 41 ML/M2 (ref 16–34)
ECHO LA VOLUME INDEX A-L A4C: 39 ML/M2 (ref 16–34)
ECHO LA VOLUME INDEX AREA LENGTH: 42 ML/M2 (ref 16–34)
ECHO LA VOLUME INDEX MOD A2C: 39 ML/M2 (ref 16–34)
ECHO LA VOLUME INDEX MOD A4C: 36 ML/M2 (ref 16–34)
ECHO LV E' LATERAL VELOCITY: 11 CM/S
ECHO LV E' SEPTAL VELOCITY: 7 CM/S
ECHO LV FRACTIONAL SHORTENING: 46 % (ref 28–44)
ECHO LV INTERNAL DIMENSION DIASTOLE INDEX: 2.56 CM/M2
ECHO LV INTERNAL DIMENSION DIASTOLIC: 4.6 CM (ref 4.2–5.9)
ECHO LV INTERNAL DIMENSION SYSTOLIC INDEX: 1.39 CM/M2
ECHO LV INTERNAL DIMENSION SYSTOLIC: 2.5 CM
ECHO LV IVSD: 0.9 CM (ref 0.6–1)
ECHO LV MASS 2D: 148.1 G (ref 88–224)
ECHO LV MASS INDEX 2D: 82.3 G/M2 (ref 49–115)
ECHO LV POSTERIOR WALL DIASTOLIC: 1 CM (ref 0.6–1)
ECHO LV RELATIVE WALL THICKNESS RATIO: 0.43
ECHO LVOT AREA: 3.1 CM2
ECHO LVOT DIAM: 2 CM
ECHO MV A VELOCITY: 0.65 M/S
ECHO MV E DECELERATION TIME (DT): 217.4 MS
ECHO MV E VELOCITY: 0.61 M/S
ECHO MV E/A RATIO: 0.94
ECHO MV E/E' LATERAL: 5.55
ECHO MV E/E' RATIO (AVERAGED): 7.13
ECHO MV E/E' SEPTAL: 8.71
ECHO PULMONARY ARTERY SYSTOLIC PRESSURE (PASP): 28 MMHG
ECHO RA AREA 4C: 20.7 CM2
ECHO RIGHT VENTRICULAR SYSTOLIC PRESSURE (RVSP): 25 MMHG
ECHO RV BASAL DIMENSION: 5.1 CM
ECHO RV TAPSE: 3 CM (ref 1.7–?)
ECHO TV REGURGITANT MAX VELOCITY: 2.37 M/S
ECHO TV REGURGITANT PEAK GRADIENT: 23 MMHG
EKG ATRIAL RATE: 51 BPM
EKG DIAGNOSIS: NORMAL
EKG P AXIS: 91 DEGREES
EKG P-R INTERVAL: 144 MS
EKG Q-T INTERVAL: 454 MS
EKG QRS DURATION: 94 MS
EKG QTC CALCULATION (BAZETT): 418 MS
EKG R AXIS: 87 DEGREES
EKG T AXIS: 79 DEGREES
EKG VENTRICULAR RATE: 51 BPM
EOSINOPHIL # BLD: 0.1 K/UL (ref 0–0.4)
EOSINOPHIL NFR BLD: 1 % (ref 0–7)
ERYTHROCYTE [DISTWIDTH] IN BLOOD BY AUTOMATED COUNT: 14 % (ref 11.5–14.5)
EST. AVERAGE GLUCOSE BLD GHB EST-MCNC: 117 MG/DL
GLUCOSE BLD STRIP.AUTO-MCNC: 174 MG/DL (ref 65–117)
GLUCOSE SERPL-MCNC: 93 MG/DL (ref 65–100)
HBA1C MFR BLD: 5.7 % (ref 4–5.6)
HCT VFR BLD AUTO: 38.3 % (ref 36.6–50.3)
HDLC SERPL-MCNC: 49 MG/DL
HDLC SERPL: 2.1 (ref 0–5)
HGB BLD-MCNC: 13.3 G/DL (ref 12.1–17)
IMM GRANULOCYTES # BLD AUTO: 0 K/UL (ref 0–0.04)
IMM GRANULOCYTES NFR BLD AUTO: 0 % (ref 0–0.5)
LDLC SERPL CALC-MCNC: 40.6 MG/DL (ref 0–100)
LYMPHOCYTES # BLD: 2.5 K/UL (ref 0.8–3.5)
LYMPHOCYTES NFR BLD: 22 % (ref 12–49)
MCH RBC QN AUTO: 32 PG (ref 26–34)
MCHC RBC AUTO-ENTMCNC: 34.7 G/DL (ref 30–36.5)
MCV RBC AUTO: 92.3 FL (ref 80–99)
MONOCYTES # BLD: 1.2 K/UL (ref 0–1)
MONOCYTES NFR BLD: 11 % (ref 5–13)
NEUTS SEG # BLD: 7.4 K/UL (ref 1.8–8)
NEUTS SEG NFR BLD: 65 % (ref 32–75)
NRBC # BLD: 0 K/UL (ref 0–0.01)
NRBC BLD-RTO: 0 PER 100 WBC
PLATELET # BLD AUTO: 302 K/UL (ref 150–400)
PMV BLD AUTO: 9.9 FL (ref 8.9–12.9)
POTASSIUM SERPL-SCNC: 3.6 MMOL/L (ref 3.5–5.1)
RBC # BLD AUTO: 4.15 M/UL (ref 4.1–5.7)
SERVICE CMNT-IMP: ABNORMAL
SODIUM SERPL-SCNC: 142 MMOL/L (ref 136–145)
TRIGL SERPL-MCNC: 67 MG/DL
TSH SERPL DL<=0.05 MIU/L-ACNC: 10.56 UIU/ML (ref 0.36–3.74)
VIT B12 SERPL-MCNC: 872 PG/ML (ref 193–986)
VLDLC SERPL CALC-MCNC: 13.4 MG/DL
WBC # BLD AUTO: 11.4 K/UL (ref 4.1–11.1)

## 2024-07-01 PROCEDURE — 6360000002 HC RX W HCPCS: Performed by: INTERNAL MEDICINE

## 2024-07-01 PROCEDURE — G0378 HOSPITAL OBSERVATION PER HR: HCPCS

## 2024-07-01 PROCEDURE — 97165 OT EVAL LOW COMPLEX 30 MIN: CPT

## 2024-07-01 PROCEDURE — 6370000000 HC RX 637 (ALT 250 FOR IP): Performed by: EMERGENCY MEDICINE

## 2024-07-01 PROCEDURE — 80061 LIPID PANEL: CPT

## 2024-07-01 PROCEDURE — 2580000003 HC RX 258: Performed by: INTERNAL MEDICINE

## 2024-07-01 PROCEDURE — 6370000000 HC RX 637 (ALT 250 FOR IP): Performed by: INTERNAL MEDICINE

## 2024-07-01 PROCEDURE — 97110 THERAPEUTIC EXERCISES: CPT

## 2024-07-01 PROCEDURE — 83036 HEMOGLOBIN GLYCOSYLATED A1C: CPT

## 2024-07-01 PROCEDURE — 93306 TTE W/DOPPLER COMPLETE: CPT | Performed by: INTERNAL MEDICINE

## 2024-07-01 PROCEDURE — 80048 BASIC METABOLIC PNL TOTAL CA: CPT

## 2024-07-01 PROCEDURE — 70544 MR ANGIOGRAPHY HEAD W/O DYE: CPT

## 2024-07-01 PROCEDURE — 97161 PT EVAL LOW COMPLEX 20 MIN: CPT

## 2024-07-01 PROCEDURE — 82607 VITAMIN B-12: CPT

## 2024-07-01 PROCEDURE — 84439 ASSAY OF FREE THYROXINE: CPT

## 2024-07-01 PROCEDURE — 85025 COMPLETE CBC W/AUTO DIFF WBC: CPT

## 2024-07-01 PROCEDURE — 82962 GLUCOSE BLOOD TEST: CPT

## 2024-07-01 PROCEDURE — 36415 COLL VENOUS BLD VENIPUNCTURE: CPT

## 2024-07-01 PROCEDURE — 70551 MRI BRAIN STEM W/O DYE: CPT

## 2024-07-01 PROCEDURE — 96376 TX/PRO/DX INJ SAME DRUG ADON: CPT

## 2024-07-01 PROCEDURE — 70547 MR ANGIOGRAPHY NECK W/O DYE: CPT

## 2024-07-01 PROCEDURE — 93306 TTE W/DOPPLER COMPLETE: CPT

## 2024-07-01 PROCEDURE — 84443 ASSAY THYROID STIM HORMONE: CPT

## 2024-07-01 PROCEDURE — 97535 SELF CARE MNGMENT TRAINING: CPT

## 2024-07-01 RX ORDER — POLYETHYLENE GLYCOL 3350 17 G/17G
17 POWDER, FOR SOLUTION ORAL DAILY PRN
Status: DISCONTINUED | OUTPATIENT
Start: 2024-07-01 | End: 2024-07-02 | Stop reason: HOSPADM

## 2024-07-01 RX ORDER — DIPHENHYDRAMINE HCL 25 MG
25 CAPSULE ORAL EVERY 6 HOURS PRN
Status: DISCONTINUED | OUTPATIENT
Start: 2024-07-01 | End: 2024-07-02 | Stop reason: HOSPADM

## 2024-07-01 RX ORDER — TRAZODONE HYDROCHLORIDE 50 MG/1
50 TABLET ORAL NIGHTLY PRN
Status: DISCONTINUED | OUTPATIENT
Start: 2024-07-01 | End: 2024-07-02 | Stop reason: HOSPADM

## 2024-07-01 RX ADMIN — FAMOTIDINE 20 MG: 20 TABLET, FILM COATED ORAL at 09:53

## 2024-07-01 RX ADMIN — DIPHENHYDRAMINE HYDROCHLORIDE 25 MG: 25 CAPSULE ORAL at 01:57

## 2024-07-01 RX ADMIN — AMLODIPINE BESYLATE 5 MG: 5 TABLET ORAL at 09:53

## 2024-07-01 RX ADMIN — ATORVASTATIN CALCIUM 80 MG: 40 TABLET, FILM COATED ORAL at 21:07

## 2024-07-01 RX ADMIN — DIPHENHYDRAMINE HYDROCHLORIDE 25 MG: 25 CAPSULE ORAL at 21:07

## 2024-07-01 RX ADMIN — SODIUM CHLORIDE, PRESERVATIVE FREE 10 ML: 5 INJECTION INTRAVENOUS at 09:55

## 2024-07-01 RX ADMIN — ASPIRIN 81 MG: 81 TABLET, CHEWABLE ORAL at 09:53

## 2024-07-01 RX ADMIN — SODIUM CHLORIDE: 9 INJECTION, SOLUTION INTRAVENOUS at 16:17

## 2024-07-01 RX ADMIN — FOLIC ACID 1 MG: 1 TABLET ORAL at 09:53

## 2024-07-01 RX ADMIN — THIAMINE HYDROCHLORIDE 100 MG: 100 INJECTION, SOLUTION INTRAMUSCULAR; INTRAVENOUS at 09:54

## 2024-07-01 ASSESSMENT — PAIN SCALES - GENERAL: PAINLEVEL_OUTOF10: 0

## 2024-07-01 NOTE — ED NOTES
Ambulatory to bathroom  
Ambulatory x1 assist to bathroom.   
Hospitalist Dr. Lockett notified that MRIs have resulted. States he will review.   
Hospitalist at bedside  
Off unit for CT  
Pt transported to MRI in stretcher with MRI tech.  
Requested and given warm blanket   
Returned   
99%  96% 97%   Weight:       Height:              Pain 1: 0    REVIEW:

## 2024-07-02 VITALS
SYSTOLIC BLOOD PRESSURE: 119 MMHG | DIASTOLIC BLOOD PRESSURE: 56 MMHG | HEART RATE: 58 BPM | TEMPERATURE: 97.3 F | OXYGEN SATURATION: 98 % | RESPIRATION RATE: 20 BRPM | HEIGHT: 69 IN | WEIGHT: 144 LBS | BODY MASS INDEX: 21.33 KG/M2

## 2024-07-02 LAB — T4 FREE SERPL-MCNC: 1.1 NG/DL (ref 0.8–1.5)

## 2024-07-02 PROCEDURE — 6370000000 HC RX 637 (ALT 250 FOR IP): Performed by: INTERNAL MEDICINE

## 2024-07-02 PROCEDURE — G0378 HOSPITAL OBSERVATION PER HR: HCPCS

## 2024-07-02 PROCEDURE — 99221 1ST HOSP IP/OBS SF/LOW 40: CPT | Performed by: NURSE PRACTITIONER

## 2024-07-02 PROCEDURE — 94760 N-INVAS EAR/PLS OXIMETRY 1: CPT

## 2024-07-02 PROCEDURE — 2580000003 HC RX 258: Performed by: INTERNAL MEDICINE

## 2024-07-02 RX ORDER — GAUZE BANDAGE 2" X 2"
100 BANDAGE TOPICAL DAILY
Status: DISCONTINUED | OUTPATIENT
Start: 2024-07-02 | End: 2024-07-02 | Stop reason: HOSPADM

## 2024-07-02 RX ADMIN — ASPIRIN 81 MG: 81 TABLET, CHEWABLE ORAL at 10:26

## 2024-07-02 RX ADMIN — AMLODIPINE BESYLATE 5 MG: 5 TABLET ORAL at 10:26

## 2024-07-02 RX ADMIN — Medication 100 MG: at 13:14

## 2024-07-02 RX ADMIN — FOLIC ACID 1 MG: 1 TABLET ORAL at 10:26

## 2024-07-02 RX ADMIN — FAMOTIDINE 20 MG: 20 TABLET, FILM COATED ORAL at 10:26

## 2024-07-02 RX ADMIN — SODIUM CHLORIDE, PRESERVATIVE FREE 10 ML: 5 INJECTION INTRAVENOUS at 10:30

## 2024-07-02 RX ADMIN — Medication 1 TABLET: at 10:26

## 2024-07-02 NOTE — PLAN OF CARE
Problem: Chronic Conditions and Co-morbidities  Goal: Patient's chronic conditions and co-morbidity symptoms are monitored and maintained or improved  7/2/2024 1858 by Imelda Padron LPN  Outcome: Adequate for Discharge  7/2/2024 1617 by Imelda Padron LPN  Outcome: Progressing     Problem: ABCDS Injury Assessment  Goal: Absence of physical injury  7/2/2024 1858 by Imelda Padron LPN  Outcome: Adequate for Discharge  7/2/2024 1617 by Imelda Padron LPN  Outcome: Progressing     Problem: Safety - Adult  Goal: Free from fall injury  7/2/2024 1858 by Imelda Padron LPN  Outcome: Adequate for Discharge  7/2/2024 1617 by Imelda Padron LPN  Outcome: Progressing     
  Problem: Chronic Conditions and Co-morbidities  Goal: Patient's chronic conditions and co-morbidity symptoms are monitored and maintained or improved  Outcome: Progressing     Problem: ABCDS Injury Assessment  Goal: Absence of physical injury  Outcome: Progressing     Problem: Safety - Adult  Goal: Free from fall injury  Outcome: Progressing     
  Problem: Chronic Conditions and Co-morbidities  Goal: Patient's chronic conditions and co-morbidity symptoms are monitored and maintained or improved  Outcome: Progressing  Flowsheets (Taken 7/1/2024 2111)  Care Plan - Patient's Chronic Conditions and Co-Morbidity Symptoms are Monitored and Maintained or Improved:   Monitor and assess patient's chronic conditions and comorbid symptoms for stability, deterioration, or improvement   Collaborate with multidisciplinary team to address chronic and comorbid conditions and prevent exacerbation or deterioration   Update acute care plan with appropriate goals if chronic or comorbid symptoms are exacerbated and prevent overall improvement and discharge     Problem: ABCDS Injury Assessment  Goal: Absence of physical injury  Outcome: Progressing  Flowsheets (Taken 7/1/2024 2111)  Absence of Physical Injury: Implement safety measures based on patient assessment     Problem: Occupational Therapy - Adult  Goal: By Discharge: Performs self-care activities at highest level of function for planned discharge setting.  See evaluation for individualized goals.  7/1/2024 1552 by Rubina Torres, OT  Outcome: Adequate for Discharge     Problem: Physical Therapy - Adult  Goal: By Discharge: Performs mobility at highest level of function for planned discharge setting.  See evaluation for individualized goals.  7/1/2024 1815 by Shaheen Mcqueen, PT  Outcome: Adequate for Discharge     
disease     Tonsil cancer (HCC)      Past Surgical History:   Procedure Laterality Date    GI      colonscopy    HEENT      OTHER SURGICAL HISTORY      Bilateral Inguinal Hernias    TONSILLECTOMY      August 2016    UROLOGICAL SURGERY      vasectomy    VASCULAR SURGERY         Prior Level of Function/Environment/Context: ADL Assistance: Independent, Homemaking Assistance: Independent, Ambulation Assistance: Independent, Transfer Assistance: Independent, Active : Yes     Expanded or extensive additional review of patient history:   Social/Functional History  Lives With: Spouse  Type of Home: House  Home Layout: Two level, Performs ADL's on one level  Home Access: Stairs to enter with rails  Entrance Stairs - Number of Steps: 5  Entrance Stairs - Rails: Left  Bathroom Shower/Tub: Walk-in shower  Bathroom Toilet: Standard  Bathroom Equipment: Built-in shower seat  Has the patient had two or more falls in the past year or any fall with injury in the past year?: No  ADL Assistance: Independent  Homemaking Assistance: Independent  Homemaking Responsibilities: Yes (splits with wife)  Ambulation Assistance: Independent  Transfer Assistance: Independent  Active : Yes  Occupation: Retired    Hand Dominance: right     EXAMINATION OF PERFORMANCE DEFICITS:    Cognitive/Behavioral Status:    Orientation  Overall Orientation Status: Within Normal Limits  Orientation Level: Oriented X4  Cognition  Overall Cognitive Status: WNL    Skin: intact    Edema: not observed    Hearing:   Hearing  Hearing: Within functional limits    Vision/Perceptual:          Vision  Vision: Impaired  Vision Exceptions: Wears glasses for reading  Perception  Overall Perceptual Status: WFL    Range of Motion:   AROM: Within functional limits  PROM: Within functional limits      Strength:  Strength: Within functional limits      Coordination:  Coordination: Within functional limits            Tone & Sensation:   Tone: Normal  Sensation: 
                                                                                                                                  Pain Ratin/10   Pain Intervention(s):       Activity Tolerance:   Good, Fair , and SpO2 stable on room air    After treatment:   Patient left in no apparent distress in bed, Call bell within reach, Bed/ chair alarm activated, and Caregiver / family present      COMMUNICATION/EDUCATION:   The patient's plan of care was discussed with: occupational therapist, registered nurse, , and certified nursing assistant/patient care technician    Patient Education  Education Given To: Patient;Family  Education Provided: Role of Therapy;Plan of Care;Precautions;Transfer Training;Fall Prevention Strategies  Education Provided Comments: cues offered to ensure safety when transferring and walking w/o AD to help reduce fall risk  Education Method: Verbal  Barriers to Learning: None  Education Outcome: Verbalized understanding;Demonstrated understanding    Thank you for this referral.  JERAMY OCONNOR, PT  Minutes: 40      Physical Therapy Evaluation Charge Determination   History Examination Presentation Decision-Making   MEDIUM  Complexity : 1-2 comorbidities / personal factors will impact the outcome/ POC  LOW Complexity : 1-2 Standardized tests and measures addressing body structure, function, activity limitation and / or participation in recreation  MEDIUM Complexity : Evolving with changing characteristics  Timed Up and Go  LOW      Based on the above components, the patient evaluation is determined to be of the following complexity level: Low

## 2024-07-02 NOTE — PROGRESS NOTES
Discharge Summary    Aubrey Bell III  :  1935  MRN:  [id number]    ADMIT DATE:  2024  DISCHARGE DATE:  2024      Discharge instruction reviewed with patient and wife    Home med's returned No. No meds to return    Personal belongings returned Yes    Patient Walked to front entrance with Nurse        SIGNED:    Imelda Padron LPN       
Pleural  spaces are normal and there is no pneumothorax. Osseous structures are intact.   IMPRESSION: No acute cardiopulmonary disease    MRA Head w/o 7/1:  MRA head demonstrates no chronic occlusion of the right PCA, similar to prior  study. No evidence of acute intracranial large vessel occlusion. No intracranial aneurysm.    MRA Neck w/o 7/1:  MRA neck demonstrates no large vessel occlusion.  Probable artifactual loss of flow related signal within bilateral vertebral  artery V3 segment. High-grade luminal narrowing not completely excluded.  Clinical correlation advised.    MRI BRAIN 7/1:  No acute brain infarct or intracranial hemorrhage.  Sequelae of remote right PCA territory infarct.  Moderate chronic microangiopathic white matter changes.     EKG 7/1:  SSB 51 bpm, Question change in QRS Axis since [id number]    ECHO 7/1:    Left Ventricle: Normal left ventricular systolic function with a visually estimated EF of 65 - 70%. Left ventricle size is normal. Normal wall thickness. Normal wall motion.    Aortic Valve: Trileaflet valve. Mildly thickened cusps. Mild regurgitation. No stenosis.    Mitral Valve: No restricted motion. Trace regurgitation.    Right Ventricle: Right ventricle is dilated. Normal systolic function. TAPSE is normal.    Pulmonary Arteries: The estimated PASP is 28 mmHg.      Procedures: see electronic medical records for all procedures/Xrays and details which were not copied into this note but were reviewed prior to creation of Plan.        Assessment / Plan:    Principal Problem:    Dizziness  Active Problems:    Ataxia    CVD (cerebrovascular disease)  No acute infarct  Cont Lipitor and ASA 81mg  Control sys BP < 140  No vertigo  PT/OT      Tonsil cancer (HCC)     Chemotherapy-induced peripheral neuropathy (HCC)  Follow with Oncology  Post radiation and chemotx  Suspect need for longterm tx Levothyroxine      Alcohol use  CIWA protocol  B1 and B12 - resume on d/c  Contributes to PN

## 2024-07-02 NOTE — DISCHARGE INSTRUCTIONS
HOSPITALIST RECOMMENDATIONS    As advised will Rx Plavix 75mg daily for 21 days  Continue Aspirin 81mg daily longterm    Discuss possible treatment for thyroid with Dr. Cid  Need to schedule a 14 day EVENT MONITOR with PCP at follow up    Continue current medications to control BP and lipids  Slow when getting up from sitting to avoid dizziness from drop in blood pressure    SHYLA CARDENAS MD    [phone]

## 2024-07-02 NOTE — CARE COORDINATION
Care Management Initial Assessment       RUR:n/a obs  Readmission? No  1st IM letter given? No  1st  letter given: No     07/02/24 0829   Service Assessment   Patient Orientation Alert and Oriented   Cognition Alert   History Provided By Patient   Primary Caregiver Self   Support Systems Spouse/Significant Other   Patient's Healthcare Decision Maker is: Legal Next of Kin   PCP Verified by CM Yes  (Turner Cid MD)   Last Visit to PCP Within last year   Prior Functional Level Independent in ADLs/IADLs   Current Functional Level Independent in ADLs/IADLs   Can patient return to prior living arrangement Yes   Ability to make needs known: Good   Family able to assist with home care needs: Yes   Would you like for me to discuss the discharge plan with any other family members/significant others, and if so, who? Yes  (Spouse)   Financial Resources Medicare   Social/Functional History   Lives With Spouse   Type of Home House   Home Equipment None   Active  Yes   Discharge Planning   Type of Residence House   Living Arrangements Spouse/Significant Other   Current Services Prior To Admission None   Potential Assistance Needed N/A   Type of Home Care Services None   Patient expects to be discharged to: House     Mr. Bell lives at home with his wife Fanny Bell. He is independent with ADLs/IADLs. Does NOT use any DME. Does NOT anticipate any discharge needs. Eager for discharge but does hope his dizziness gets better. Mr. Bell was provided CM introductory letter and told to contact CM for any questions or concerns during/after his stay.

## 2024-07-02 NOTE — CONSULTS
[facility]  Neurology Consult Note      Date:  24     Name:  DALTON BELL III  :  1935  MRN:  [id number]     PCP:  Turner Cid MD    REQUESTING PHYSICIAN: Dr. Dipak Burgos ARLETH Bell III is a 88 y.o. male who was seen for Dizziness    Assessment & Plan:   Dizziness (R42):    Mr. Bell is a 88-year-old male with hypertension and previous right PCA ischemic infarct.  Patient states for several years he has had feelings of dizziness which on description sounds like lightheadedness as he feels the symptoms primarily with standing and it takes a few moments for him to adjust.  But these symptoms of dizziness were associated with difficulties walking and were much more pronounced than his usual symptoms.  MRI is negative for stroke, however she will vascular imaging he is noted to have an occluded right PCA and possible high-grade luminal narrowing of the vertebral arteries.  He is also noted to have a heart rate in the 50s to 60s.    Patient's symptoms may represent global hypoperfusion related to orthostatic drop in blood pressure, low heart rate, but cannot definitively rule out posterior circulation TIA.    Recommend adding Plavix 75 mg to patient's medication regimen and have patient continue on dual antiplatelet therapy with aspirin 81, Plavix 75 for 21 days and then continue on monotherapy with aspirin  Continue Crestor his LDL is at goal at 40.6  TSH is 10.56, recommend T3, T4  I recommend he stay well-hydrated and transition from lying to sitting to standing slowly  Discharged on a 2-week event monitor  Obtain orthostatic vital signs    Will schedule hospital follow-up either virtually or in person in 4 to 6 weeks time    ICD-10-CM    1. Dizziness  R42       2. Cerebrovascular accident (CVA) due to occlusion of right middle cerebral artery (HCC)  I63.511 Echo (TTE) complete (PRN contrast/bubble/strain/3D)     Echo (TTE) complete (PRN contrast/bubble/strain/3D)     CANCELED:

## 2024-07-02 NOTE — DISCHARGE SUMMARY
AMBIEN            STOP taking these medications      ibuprofen 200 MG tablet  Commonly known as: ADVIL;MOTRIN              My Recommended  Diet: Cardiac  Activity: Ad Misti  Wound Care: none  Follow-up labs: Need EVENT MONITOR planned with PCP at follow up    ______________________________________________________________________  DISPOSITION:    Home with Family: x   Home with HH/PT/OT/RN:    SNF/LTC:    ZOE:    OTHER:        Condition at Discharge:  Stable  _____________________________________________________________________  Follow up with:   PCP : Turner Cid MD  Follow-up Information       Follow up With Specialties Details Why Contact Info    Turner Cid MD Family Medicine Follow up in 10 day(s)  [address] [facility] [address]  [phone]            Total time in minutes spent coordinating this discharge (includes going over instructions, follow-up, prescriptions, and preparing report for sign off to her PCP) :35 minutes    Signed:  Jake Lockett MD  [facility] Hospitalist  [phone]

## 2024-07-02 NOTE — ACP (ADVANCE CARE PLANNING)
Advance Care Planning     General Advance Care Planning (ACP) Conversation    Date of Conversation: 7/2/2024  Conducted with: Patient with Decision Making Capacity  Other persons present: None    Healthcare Decision Maker:   Primary Decision Maker: Fanny Bell - Spouse - [phone]  Click here to complete Healthcare Decision Makers including selection of the Healthcare Decision Maker Relationship (ie \"Primary\").   Today we documented Decision Maker(s) consistent with Legal Next of Kin hierarchy.    Content/Action Overview:  Has ACP document(s) on file - do NOT reflect the patient's care preferences, patient to provide new document(s)  treatment goals  N/a    Length of Voluntary ACP Conversation in minutes:  <16 minutes (Non-Billable)    Susan Garcia

## 2024-07-02 NOTE — CARE COORDINATION
Medicare Outpatient Observation Notice provided to Aubrey Bell. Oral explanation was provided and all questions answered. Signed document placed on the bedside chart to be scanned under the media tab. Copy provided to Aubrey Bell

## 2024-07-10 ENCOUNTER — TELEPHONE (OUTPATIENT)
Age: 89
End: 2024-07-10

## 2024-07-10 NOTE — TELEPHONE ENCOUNTER
Called spoke with pt, he wanted me to call back tomorrow to speak with his wife at [phone] x [phone] to make his 4-8 week hosp f/u

## 2025-05-07 ENCOUNTER — HOSPITAL ENCOUNTER (EMERGENCY)
Facility: HOSPITAL | Age: 89
Discharge: HOME | End: 2025-05-07
Attending: EMERGENCY MEDICINE
Payer: MEDICARE

## 2025-05-07 ENCOUNTER — APPOINTMENT (OUTPATIENT)
Facility: HOSPITAL | Age: 89
End: 2025-05-07
Payer: MEDICARE

## 2025-05-07 VITALS
RESPIRATION RATE: 14 BRPM | TEMPERATURE: 98.4 F | OXYGEN SATURATION: 96 % | HEART RATE: 66 BPM | SYSTOLIC BLOOD PRESSURE: 139 MMHG | DIASTOLIC BLOOD PRESSURE: 57 MMHG

## 2025-05-07 DIAGNOSIS — E86.0 DEHYDRATION: Primary | ICD-10-CM

## 2025-05-07 DIAGNOSIS — E87.1 HYPONATREMIA: ICD-10-CM

## 2025-05-07 LAB
ALBUMIN SERPL-MCNC: 3.8 G/DL (ref 3.5–5)
ALBUMIN/GLOB SERPL: 1.2 (ref 1.1–2.2)
ALP SERPL-CCNC: 88 U/L (ref 45–117)
ALT SERPL-CCNC: 14 U/L (ref 12–78)
ANION GAP SERPL CALC-SCNC: 8 MMOL/L (ref 2–12)
APPEARANCE UR: CLEAR
AST SERPL-CCNC: 33 U/L (ref 15–37)
BACTERIA URNS QL MICRO: NEGATIVE /HPF
BASOPHILS # BLD: 0 K/UL (ref 0–0.1)
BASOPHILS NFR BLD: 0 % (ref 0–1)
BILIRUB SERPL-MCNC: 0.3 MG/DL (ref 0.2–1)
BILIRUB UR QL: NEGATIVE
BUN SERPL-MCNC: 27 MG/DL (ref 6–20)
BUN/CREAT SERPL: 25 (ref 12–20)
CALCIUM SERPL-MCNC: 8.9 MG/DL (ref 8.5–10.1)
CHLORIDE SERPL-SCNC: 95 MMOL/L (ref 97–108)
CO2 SERPL-SCNC: 29 MMOL/L (ref 21–32)
COLOR UR: ABNORMAL
CREAT SERPL-MCNC: 1.1 MG/DL (ref 0.7–1.3)
DIFFERENTIAL METHOD BLD: ABNORMAL
EOSINOPHIL # BLD: 0 K/UL (ref 0–0.4)
EOSINOPHIL NFR BLD: 0 % (ref 0–7)
EPITH CASTS URNS QL MICRO: ABNORMAL /LPF
ERYTHROCYTE [DISTWIDTH] IN BLOOD BY AUTOMATED COUNT: 14 % (ref 11.5–14.5)
FLUAV RNA SPEC QL NAA+PROBE: NOT DETECTED
FLUBV RNA SPEC QL NAA+PROBE: NOT DETECTED
GLOBULIN SER CALC-MCNC: 3.3 G/DL (ref 2–4)
GLUCOSE SERPL-MCNC: 115 MG/DL (ref 65–100)
GLUCOSE UR STRIP.AUTO-MCNC: NEGATIVE MG/DL
HCT VFR BLD AUTO: 44 % (ref 36.6–50.3)
HGB BLD-MCNC: 15.3 G/DL (ref 12.1–17)
HGB UR QL STRIP: NEGATIVE
IMM GRANULOCYTES # BLD AUTO: 0 K/UL (ref 0–0.04)
IMM GRANULOCYTES NFR BLD AUTO: 0 % (ref 0–0.5)
KETONES UR QL STRIP.AUTO: 15 MG/DL
LEUKOCYTE ESTERASE UR QL STRIP.AUTO: NEGATIVE
LIPASE SERPL-CCNC: 81 U/L (ref 13–75)
LYMPHOCYTES # BLD: 0.56 K/UL (ref 0.8–3.5)
LYMPHOCYTES NFR BLD: 17 % (ref 12–49)
MCH RBC QN AUTO: 32 PG (ref 26–34)
MCHC RBC AUTO-ENTMCNC: 34.8 G/DL (ref 30–36.5)
MCV RBC AUTO: 92.1 FL (ref 80–99)
MONOCYTES # BLD: 0.33 K/UL (ref 0–1)
MONOCYTES NFR BLD: 10 % (ref 5–13)
NEUTS BAND NFR BLD MANUAL: 7 %
NEUTS SEG # BLD: 2.41 K/UL (ref 1.8–8)
NEUTS SEG NFR BLD: 66 % (ref 32–75)
NITRITE UR QL STRIP.AUTO: NEGATIVE
NRBC # BLD: 0 K/UL (ref 0–0.01)
NRBC BLD-RTO: 0 PER 100 WBC
NT PRO BNP: 187 PG/ML (ref 0–450)
PH UR STRIP: 6 (ref 5–8)
PLATELET # BLD AUTO: 164 K/UL (ref 150–400)
PLATELET COMMENT: ABNORMAL
PMV BLD AUTO: 10.5 FL (ref 8.9–12.9)
POTASSIUM SERPL-SCNC: 4.2 MMOL/L (ref 3.5–5.1)
PROT SERPL-MCNC: 7.1 G/DL (ref 6.4–8.2)
PROT UR STRIP-MCNC: ABNORMAL MG/DL
RBC # BLD AUTO: 4.78 M/UL (ref 4.1–5.7)
RBC #/AREA URNS HPF: ABNORMAL /HPF (ref 0–5)
RBC MORPH BLD: ABNORMAL
SARS-COV-2 RNA RESP QL NAA+PROBE: NOT DETECTED
SODIUM SERPL-SCNC: 132 MMOL/L (ref 136–145)
SOURCE: NORMAL
SP GR UR REFRACTOMETRY: 1.02 (ref 1–1.03)
TROPONIN I SERPL HS-MCNC: 31 NG/L (ref 0–76)
TSH SERPL DL<=0.05 MIU/L-ACNC: 4.59 UIU/ML (ref 0.36–3.74)
URINE CULTURE IF INDICATED: ABNORMAL
UROBILINOGEN UR QL STRIP.AUTO: 0.2 EU/DL (ref 0.2–1)
WBC # BLD AUTO: 3.3 K/UL (ref 4.1–11.1)
WBC URNS QL MICRO: ABNORMAL /HPF (ref 0–4)

## 2025-05-07 PROCEDURE — 71045 X-RAY EXAM CHEST 1 VIEW: CPT

## 2025-05-07 PROCEDURE — 81001 URINALYSIS AUTO W/SCOPE: CPT

## 2025-05-07 PROCEDURE — 99285 EMERGENCY DEPT VISIT HI MDM: CPT

## 2025-05-07 PROCEDURE — 80053 COMPREHEN METABOLIC PANEL: CPT

## 2025-05-07 PROCEDURE — 6360000002 HC RX W HCPCS: Performed by: EMERGENCY MEDICINE

## 2025-05-07 PROCEDURE — 85025 COMPLETE CBC W/AUTO DIFF WBC: CPT

## 2025-05-07 PROCEDURE — 83690 ASSAY OF LIPASE: CPT

## 2025-05-07 PROCEDURE — 36415 COLL VENOUS BLD VENIPUNCTURE: CPT

## 2025-05-07 PROCEDURE — 96374 THER/PROPH/DIAG INJ IV PUSH: CPT

## 2025-05-07 PROCEDURE — 2580000003 HC RX 258: Performed by: EMERGENCY MEDICINE

## 2025-05-07 PROCEDURE — 84443 ASSAY THYROID STIM HORMONE: CPT

## 2025-05-07 PROCEDURE — 84484 ASSAY OF TROPONIN QUANT: CPT

## 2025-05-07 PROCEDURE — 93005 ELECTROCARDIOGRAM TRACING: CPT | Performed by: EMERGENCY MEDICINE

## 2025-05-07 PROCEDURE — 83880 ASSAY OF NATRIURETIC PEPTIDE: CPT

## 2025-05-07 PROCEDURE — 87636 SARSCOV2 & INF A&B AMP PRB: CPT

## 2025-05-07 PROCEDURE — 96361 HYDRATE IV INFUSION ADD-ON: CPT

## 2025-05-07 RX ORDER — 0.9 % SODIUM CHLORIDE 0.9 %
1000 INTRAVENOUS SOLUTION INTRAVENOUS ONCE
Status: COMPLETED | OUTPATIENT
Start: 2025-05-07 | End: 2025-05-07

## 2025-05-07 RX ORDER — ONDANSETRON 4 MG/1
4 TABLET, ORALLY DISINTEGRATING ORAL 3 TIMES DAILY PRN
Qty: 21 TABLET | Refills: 0 | Status: ON HOLD | OUTPATIENT
Start: 2025-05-07

## 2025-05-07 RX ORDER — ONDANSETRON 2 MG/ML
4 INJECTION INTRAMUSCULAR; INTRAVENOUS ONCE
Status: COMPLETED | OUTPATIENT
Start: 2025-05-07 | End: 2025-05-07

## 2025-05-07 RX ADMIN — SODIUM CHLORIDE 1000 ML: 0.9 INJECTION, SOLUTION INTRAVENOUS at 15:24

## 2025-05-07 RX ADMIN — ONDANSETRON 4 MG: 2 INJECTION, SOLUTION INTRAMUSCULAR; INTRAVENOUS at 19:12

## 2025-05-07 ASSESSMENT — LIFESTYLE VARIABLES
HOW OFTEN DO YOU HAVE A DRINK CONTAINING ALCOHOL: NEVER
HOW MANY STANDARD DRINKS CONTAINING ALCOHOL DO YOU HAVE ON A TYPICAL DAY: PATIENT DOES NOT DRINK

## 2025-05-07 ASSESSMENT — ENCOUNTER SYMPTOMS
SORE THROAT: 0
SHORTNESS OF BREATH: 0
ABDOMINAL PAIN: 0
VOMITING: 1
DIARRHEA: 0
EYE REDNESS: 0
NAUSEA: 1
COUGH: 0

## 2025-05-07 ASSESSMENT — PAIN SCALES - GENERAL: PAINLEVEL_OUTOF10: 5

## 2025-05-07 ASSESSMENT — PAIN - FUNCTIONAL ASSESSMENT: PAIN_FUNCTIONAL_ASSESSMENT: 0-10

## 2025-05-07 NOTE — ED NOTES
Knocked on door to announce to pt. Hands washed. Introduced self to pt and updated whiteboard. Explained role of self to pt. ED flow explained to pt. Pt verbalized understanding. Pt wife at bedside, updated on plan of care.

## 2025-05-07 NOTE — ED PROVIDER NOTES
EMERGENCY DEPARTMENT HISTORY AND PHYSICAL EXAM      Date: 5/7/2025  Patient Name: Aubrey Bell III    History of Presenting Illness     Chief Complaint   Patient presents with    Fatigue       History Provided By: Patient     HPI: Aubrey Bell III, 89 y.o. male with past medical history listed below, presents via private vehicle to the ED with cc of fatigue.  Patient reports generalized fatigue and poor appetite for the past 2 3 days.  Wife reports she has had diarrhea and had 1 episode of vomiting.  Patient denies any abdominal pain.  No chest pain.  No shortness of breath.  No fevers or chills.  No dysuria or hematuria.  Patient reports I feel \"lousy.\"        There are no other complaints, changes, or physical findings at this time.    PCP: Turner iCd MD    No current facility-administered medications on file prior to encounter.     Current Outpatient Medications on File Prior to Encounter   Medication Sig Dispense Refill    amLODIPine (NORVASC) 5 MG tablet Take 1 tablet by mouth daily      aspirin 81 MG chewable tablet Take 1 tablet by mouth daily      atorvastatin (LIPITOR) 80 MG tablet Take 1 tablet by mouth nightly      diphenhydrAMINE-APAP, sleep, (TYLENOL PM EXTRA STRENGTH)  MG tablet Take 1 tablet by mouth      famotidine (PEPCID) 20 MG tablet Take 1 tablet by mouth      meclizine (ANTIVERT) 25 MG tablet Take 1 tablet by mouth 3 times daily as needed      polyethylene glycol (GLYCOLAX) 17 GM/SCOOP powder Take 17 g by mouth daily      zolpidem (AMBIEN) 10 MG tablet Take 1 tablet by mouth.         Past History     Past Medical History:  Past Medical History:   Diagnosis Date    GERD (gastroesophageal reflux disease)     Raynaud disease     Tonsil cancer (HCC)        Past Surgical History:  Past Surgical History:   Procedure Laterality Date    GI      colonscopy    HEENT      OTHER SURGICAL HISTORY      Bilateral Inguinal Hernias    TONSILLECTOMY      August 2016    UROLOGICAL SURGERY

## 2025-05-07 NOTE — ED NOTES
Pt assisted with dressing, with getting out of bed, moving to wheelchair, with bathrobe. Pt tolerated moderately well. Pt wheeled to wife's car and assisted in.    I have reviewed discharge instructions with the patient and spouse. The patient and spouse verbalized understanding. Discharge medications discussed with patient. No questions at this time. Wheeled to wife's car.

## 2025-05-07 NOTE — ED TRIAGE NOTES
Presents with c/o fatigue and poor appetite x2-3 days. Patient states he's had 1 episode of vomiting and 1 episode of diarrhea.   Denies abdominal pain or cramping, chest pain, difficulty breathing or urinary symptoms. Denies fevers at home.   States \"I just feel sick\".

## 2025-05-07 NOTE — ED NOTES
Pt does not feel he can urinate yet, still reports feeling 'bad' but no specific complaints as to what this refers. Pt wife leaving to take care of pets then return. Per ED Provider pt having additional IV fluids while waiting on urine specimen.

## 2025-05-10 ENCOUNTER — APPOINTMENT (OUTPATIENT)
Facility: HOSPITAL | Age: 89
End: 2025-05-10
Payer: MEDICARE

## 2025-05-10 ENCOUNTER — HOSPITAL ENCOUNTER (EMERGENCY)
Facility: HOSPITAL | Age: 89
Discharge: ANOTHER HOSPITAL | End: 2025-05-11
Attending: EMERGENCY MEDICINE
Payer: MEDICARE

## 2025-05-10 DIAGNOSIS — R79.89 TROPONIN LEVEL ELEVATED: ICD-10-CM

## 2025-05-10 DIAGNOSIS — E86.0 DEHYDRATION: Primary | ICD-10-CM

## 2025-05-10 LAB
ALBUMIN SERPL-MCNC: 3.4 G/DL (ref 3.5–5)
ALBUMIN/GLOB SERPL: 1.1 (ref 1.1–2.2)
ALP SERPL-CCNC: 74 U/L (ref 45–117)
ALT SERPL-CCNC: 49 U/L (ref 12–78)
AMORPH CRY URNS QL MICRO: ABNORMAL
ANION GAP SERPL CALC-SCNC: 7 MMOL/L (ref 2–12)
APPEARANCE UR: CLEAR
APTT PPP: 29.9 SEC (ref 22.1–31)
AST SERPL-CCNC: 137 U/L (ref 15–37)
BACTERIA URNS QL MICRO: NEGATIVE /HPF
BASOPHILS # BLD: 0 K/UL (ref 0–0.1)
BASOPHILS NFR BLD: 0 % (ref 0–1)
BILIRUB SERPL-MCNC: 0.4 MG/DL (ref 0.2–1)
BILIRUB UR QL: NEGATIVE
BUN SERPL-MCNC: 37 MG/DL (ref 6–20)
BUN/CREAT SERPL: 28 (ref 12–20)
CALCIUM SERPL-MCNC: 8.4 MG/DL (ref 8.5–10.1)
CHLORIDE SERPL-SCNC: 101 MMOL/L (ref 97–108)
CK SERPL-CCNC: 593 U/L (ref 39–308)
CO2 SERPL-SCNC: 27 MMOL/L (ref 21–32)
COLOR UR: ABNORMAL
CREAT SERPL-MCNC: 1.3 MG/DL (ref 0.7–1.3)
DIFFERENTIAL METHOD BLD: ABNORMAL
EKG ATRIAL RATE: 63 BPM
EKG ATRIAL RATE: 67 BPM
EKG DIAGNOSIS: NORMAL
EKG DIAGNOSIS: NORMAL
EKG P AXIS: -9 DEGREES
EKG P AXIS: 110 DEGREES
EKG P-R INTERVAL: 144 MS
EKG P-R INTERVAL: 146 MS
EKG Q-T INTERVAL: 408 MS
EKG Q-T INTERVAL: 418 MS
EKG QRS DURATION: 92 MS
EKG QRS DURATION: 98 MS
EKG QTC CALCULATION (BAZETT): 427 MS
EKG QTC CALCULATION (BAZETT): 431 MS
EKG R AXIS: 84 DEGREES
EKG R AXIS: 85 DEGREES
EKG T AXIS: 74 DEGREES
EKG T AXIS: 75 DEGREES
EKG VENTRICULAR RATE: 63 BPM
EKG VENTRICULAR RATE: 67 BPM
EOSINOPHIL # BLD: 0 K/UL (ref 0–0.4)
EOSINOPHIL NFR BLD: 0 % (ref 0–7)
EPITH CASTS URNS QL MICRO: ABNORMAL /LPF
ERYTHROCYTE [DISTWIDTH] IN BLOOD BY AUTOMATED COUNT: 14.4 % (ref 11.5–14.5)
GLOBULIN SER CALC-MCNC: 3.1 G/DL (ref 2–4)
GLUCOSE SERPL-MCNC: 136 MG/DL (ref 65–100)
GLUCOSE UR STRIP.AUTO-MCNC: NEGATIVE MG/DL
HCT VFR BLD AUTO: 43.9 % (ref 36.6–50.3)
HGB BLD-MCNC: 15.3 G/DL (ref 12.1–17)
HGB UR QL STRIP: ABNORMAL
HYALINE CASTS URNS QL MICRO: ABNORMAL /LPF (ref 0–5)
IMM GRANULOCYTES # BLD AUTO: 0 K/UL (ref 0–0.04)
IMM GRANULOCYTES NFR BLD AUTO: 0 % (ref 0–0.5)
INR PPP: 1 (ref 0.9–1.1)
KETONES UR QL STRIP.AUTO: 15 MG/DL
LEUKOCYTE ESTERASE UR QL STRIP.AUTO: NEGATIVE
LYMPHOCYTES # BLD: 0.78 K/UL (ref 0.8–3.5)
LYMPHOCYTES NFR BLD: 26 % (ref 12–49)
MAGNESIUM SERPL-MCNC: 1.7 MG/DL (ref 1.6–2.4)
MCH RBC QN AUTO: 31.9 PG (ref 26–34)
MCHC RBC AUTO-ENTMCNC: 34.9 G/DL (ref 30–36.5)
MCV RBC AUTO: 91.5 FL (ref 80–99)
METAMYELOCYTES NFR BLD MANUAL: 2 %
MONOCYTES # BLD: 0.24 K/UL (ref 0–1)
MONOCYTES NFR BLD: 8 % (ref 5–13)
MYELOCYTES NFR BLD MANUAL: 1 %
NEUTS BAND NFR BLD MANUAL: 2 %
NEUTS SEG # BLD: 1.89 K/UL (ref 1.8–8)
NEUTS SEG NFR BLD: 61 % (ref 32–75)
NITRITE UR QL STRIP.AUTO: NEGATIVE
NRBC # BLD: 0 K/UL (ref 0–0.01)
NRBC BLD-RTO: 0 PER 100 WBC
PH UR STRIP: 6 (ref 5–8)
PLATELET # BLD AUTO: 75 K/UL (ref 150–400)
PLATELET COMMENT: ABNORMAL
PMV BLD AUTO: 12.4 FL (ref 8.9–12.9)
POTASSIUM SERPL-SCNC: 4.5 MMOL/L (ref 3.5–5.1)
PROT SERPL-MCNC: 6.5 G/DL (ref 6.4–8.2)
PROT UR STRIP-MCNC: >300 MG/DL
PROTHROMBIN TIME: 10.5 SEC (ref 9.2–11.2)
RBC # BLD AUTO: 4.8 M/UL (ref 4.1–5.7)
RBC #/AREA URNS HPF: ABNORMAL /HPF (ref 0–5)
RBC MORPH BLD: ABNORMAL
SODIUM SERPL-SCNC: 135 MMOL/L (ref 136–145)
SP GR UR REFRACTOMETRY: 1.02 (ref 1–1.03)
THERAPEUTIC RANGE: NORMAL SECS (ref 58–77)
TROPONIN I SERPL HS-MCNC: 166 NG/L (ref 0–76)
TROPONIN I SERPL HS-MCNC: 177 NG/L (ref 0–76)
TSH SERPL DL<=0.05 MIU/L-ACNC: 2.59 UIU/ML (ref 0.36–3.74)
UFH PPP CHRO-ACNC: <0.1 IU/ML
URINE CULTURE IF INDICATED: ABNORMAL
UROBILINOGEN UR QL STRIP.AUTO: 0.2 EU/DL (ref 0.2–1)
WBC # BLD AUTO: 3 K/UL (ref 4.1–11.1)
WBC URNS QL MICRO: ABNORMAL /HPF (ref 0–4)

## 2025-05-10 PROCEDURE — 85520 HEPARIN ASSAY: CPT

## 2025-05-10 PROCEDURE — 84443 ASSAY THYROID STIM HORMONE: CPT

## 2025-05-10 PROCEDURE — 82550 ASSAY OF CK (CPK): CPT

## 2025-05-10 PROCEDURE — 71045 X-RAY EXAM CHEST 1 VIEW: CPT

## 2025-05-10 PROCEDURE — 81001 URINALYSIS AUTO W/SCOPE: CPT

## 2025-05-10 PROCEDURE — 36415 COLL VENOUS BLD VENIPUNCTURE: CPT

## 2025-05-10 PROCEDURE — 6360000002 HC RX W HCPCS: Performed by: EMERGENCY MEDICINE

## 2025-05-10 PROCEDURE — 80053 COMPREHEN METABOLIC PANEL: CPT

## 2025-05-10 PROCEDURE — 84484 ASSAY OF TROPONIN QUANT: CPT

## 2025-05-10 PROCEDURE — 83735 ASSAY OF MAGNESIUM: CPT

## 2025-05-10 PROCEDURE — 96374 THER/PROPH/DIAG INJ IV PUSH: CPT

## 2025-05-10 PROCEDURE — 85025 COMPLETE CBC W/AUTO DIFF WBC: CPT

## 2025-05-10 PROCEDURE — 99285 EMERGENCY DEPT VISIT HI MDM: CPT

## 2025-05-10 PROCEDURE — 96361 HYDRATE IV INFUSION ADD-ON: CPT

## 2025-05-10 PROCEDURE — 93005 ELECTROCARDIOGRAM TRACING: CPT | Performed by: EMERGENCY MEDICINE

## 2025-05-10 PROCEDURE — 85610 PROTHROMBIN TIME: CPT

## 2025-05-10 PROCEDURE — 85730 THROMBOPLASTIN TIME PARTIAL: CPT

## 2025-05-10 PROCEDURE — 2580000003 HC RX 258: Performed by: EMERGENCY MEDICINE

## 2025-05-10 RX ORDER — HEPARIN SODIUM 1000 [USP'U]/ML
60 INJECTION, SOLUTION INTRAVENOUS; SUBCUTANEOUS PRN
Status: DISCONTINUED | OUTPATIENT
Start: 2025-05-10 | End: 2025-05-11 | Stop reason: HOSPADM

## 2025-05-10 RX ORDER — HEPARIN SODIUM 1000 [USP'U]/ML
60 INJECTION, SOLUTION INTRAVENOUS; SUBCUTANEOUS ONCE
Status: COMPLETED | OUTPATIENT
Start: 2025-05-10 | End: 2025-05-10

## 2025-05-10 RX ORDER — HEPARIN SODIUM 1000 [USP'U]/ML
30 INJECTION, SOLUTION INTRAVENOUS; SUBCUTANEOUS PRN
Status: DISCONTINUED | OUTPATIENT
Start: 2025-05-10 | End: 2025-05-11 | Stop reason: HOSPADM

## 2025-05-10 RX ORDER — 0.9 % SODIUM CHLORIDE 0.9 %
1000 INTRAVENOUS SOLUTION INTRAVENOUS ONCE
Status: COMPLETED | OUTPATIENT
Start: 2025-05-10 | End: 2025-05-10

## 2025-05-10 RX ORDER — HEPARIN SODIUM 10000 [USP'U]/100ML
5-30 INJECTION, SOLUTION INTRAVENOUS CONTINUOUS
Status: DISCONTINUED | OUTPATIENT
Start: 2025-05-10 | End: 2025-05-11 | Stop reason: HOSPADM

## 2025-05-10 RX ADMIN — HEPARIN SODIUM 3300 UNITS: 1000 INJECTION INTRAVENOUS; SUBCUTANEOUS at 20:25

## 2025-05-10 RX ADMIN — SODIUM CHLORIDE 1000 ML: 0.9 INJECTION, SOLUTION INTRAVENOUS at 17:16

## 2025-05-10 RX ADMIN — HEPARIN SODIUM 12 UNITS/KG/HR: 10000 INJECTION, SOLUTION INTRAVENOUS at 20:29

## 2025-05-10 ASSESSMENT — PAIN SCALES - GENERAL: PAINLEVEL_OUTOF10: 8

## 2025-05-10 NOTE — ED PROVIDER NOTES
[facility] EMERGENCY DEPARTMENT  EMERGENCY DEPARTMENT ENCOUNTER       Pt Name: Aubrey Bell III  MRN: [id number]  Birthdate 7/14/1935  Date of evaluation: 5/10/2025  Provider: Viviane Shah MD   PCP: Turner Cid MD  Note Started: 7:13 PM EDT 5/10/25     CHIEF COMPLAINT       No chief complaint on file.       HISTORY OF PRESENT ILLNESS: 1 or more elements      History From: Patient's wife, History limited by: Severe weakness/fatigue     Aubrey Bell III is a 89 y.o. male presents to ED complaining of weakness.  Wife reports that the patient has had diarrhea over the last week.  Came to the emergency department and received IV fluids, then became severely weak over the last few days.  Today was unable to walk at home.       Please See MDM for Additional Details of the HPI/PMH  Nursing Notes were all reviewed and agreed with or any disagreements were addressed in the HPI.     REVIEW OF SYSTEMS        Positives and Pertinent negatives as per HPI.    PAST HISTORY     Past Medical History:  Past Medical History:   Diagnosis Date    GERD (gastroesophageal reflux disease)     Raynaud disease     Tonsil cancer (HCC)        Past Surgical History:  Past Surgical History:   Procedure Laterality Date    GI      colonscopy    HEENT      OTHER SURGICAL HISTORY      Bilateral Inguinal Hernias    TONSILLECTOMY      August 2016    UROLOGICAL SURGERY      vasectomy    VASCULAR SURGERY         Family History:  Family History   Problem Relation Age of Onset    Parkinson's Disease Father     Dementia Mother        Social History:  Social History     Tobacco Use    Smoking status: Former    Smokeless tobacco: Never   Substance Use Topics    Alcohol use: Yes       CURRENT MEDICATIONS      Previous Medications    AMLODIPINE (NORVASC) 5 MG TABLET    Take 1 tablet by mouth daily    ASPIRIN 81 MG CHEWABLE TABLET    Take 1 tablet by mouth daily    ATORVASTATIN (LIPITOR) 80 MG TABLET    Take 1 tablet by mouth nightly

## 2025-05-10 NOTE — ED TRIAGE NOTES
Via EMS with reports of weakness, dark stools, nausea, and poor appetite. Per EMS, patient was helped to the floor by his family because of weakness. Was seen in ED on 5/7 for similar c/o and treated for dehydration.

## 2025-05-11 ENCOUNTER — APPOINTMENT (OUTPATIENT)
Facility: HOSPITAL | Age: 89
DRG: 870 | End: 2025-05-11
Attending: STUDENT IN AN ORGANIZED HEALTH CARE EDUCATION/TRAINING PROGRAM
Payer: MEDICARE

## 2025-05-11 ENCOUNTER — HOSPITAL ENCOUNTER (INPATIENT)
Facility: HOSPITAL | Age: 89
LOS: 8 days | DRG: 870 | End: 2025-05-19
Attending: STUDENT IN AN ORGANIZED HEALTH CARE EDUCATION/TRAINING PROGRAM | Admitting: STUDENT IN AN ORGANIZED HEALTH CARE EDUCATION/TRAINING PROGRAM
Payer: MEDICARE

## 2025-05-11 VITALS
SYSTOLIC BLOOD PRESSURE: 129 MMHG | BODY MASS INDEX: 18.16 KG/M2 | RESPIRATION RATE: 37 BRPM | OXYGEN SATURATION: 91 % | HEART RATE: 87 BPM | TEMPERATURE: 98.9 F | WEIGHT: 123 LBS | DIASTOLIC BLOOD PRESSURE: 63 MMHG

## 2025-05-11 DIAGNOSIS — R79.89 ELEVATED TROPONIN: Primary | ICD-10-CM

## 2025-05-11 PROBLEM — N17.9 AKI (ACUTE KIDNEY INJURY): Status: ACTIVE | Noted: 2025-05-11

## 2025-05-11 PROBLEM — R62.7 FAILURE TO THRIVE IN ADULT: Status: ACTIVE | Noted: 2025-05-11

## 2025-05-11 LAB
ALBUMIN SERPL-MCNC: 3.2 G/DL (ref 3.5–5)
ALBUMIN/GLOB SERPL: 1.2 (ref 1.1–2.2)
ALP SERPL-CCNC: 61 U/L (ref 45–117)
ALT SERPL-CCNC: 53 U/L (ref 12–78)
ANION GAP SERPL CALC-SCNC: 11 MMOL/L (ref 2–12)
AST SERPL-CCNC: 198 U/L (ref 15–37)
BASOPHILS # BLD: 0 K/UL (ref 0–0.1)
BASOPHILS NFR BLD: 0 % (ref 0–1)
BILIRUB DIRECT SERPL-MCNC: 0.2 MG/DL (ref 0–0.2)
BILIRUB SERPL-MCNC: 0.4 MG/DL (ref 0.2–1)
BUN SERPL-MCNC: 43 MG/DL (ref 6–20)
BUN/CREAT SERPL: 35 (ref 12–20)
C DIFF GDH STL QL: NEGATIVE
C DIFF TOX A+B STL QL IA: NEGATIVE
C DIFF TOXIN INTERPRETATION: NORMAL
CALCIUM SERPL-MCNC: 8.2 MG/DL (ref 8.5–10.1)
CHLORIDE SERPL-SCNC: 106 MMOL/L (ref 97–108)
CK SERPL-CCNC: 532 U/L (ref 39–308)
CO2 SERPL-SCNC: 19 MMOL/L (ref 21–32)
CREAT SERPL-MCNC: 1.23 MG/DL (ref 0.7–1.3)
DIFFERENTIAL METHOD BLD: ABNORMAL
EOSINOPHIL # BLD: 0 K/UL (ref 0–0.4)
EOSINOPHIL NFR BLD: 0 % (ref 0–7)
ERYTHROCYTE [DISTWIDTH] IN BLOOD BY AUTOMATED COUNT: 14.9 % (ref 11.5–14.5)
FLUAV RNA SPEC QL NAA+PROBE: NOT DETECTED
FLUBV RNA SPEC QL NAA+PROBE: NOT DETECTED
GLOBULIN SER CALC-MCNC: 2.7 G/DL (ref 2–4)
GLUCOSE BLD STRIP.AUTO-MCNC: 137 MG/DL (ref 65–117)
GLUCOSE SERPL-MCNC: 121 MG/DL (ref 65–100)
HCT VFR BLD AUTO: 45.3 % (ref 36.6–50.3)
HGB BLD-MCNC: 15.4 G/DL (ref 12.1–17)
IMM GRANULOCYTES # BLD AUTO: 0 K/UL (ref 0–0.04)
IMM GRANULOCYTES NFR BLD AUTO: 0 % (ref 0–0.5)
INR PPP: 1.1 (ref 0.9–1.1)
LACTATE SERPL-SCNC: 2 MMOL/L (ref 0.4–2)
LYMPHOCYTES # BLD: 0.69 K/UL (ref 0.8–3.5)
LYMPHOCYTES NFR BLD: 15 % (ref 12–49)
MAGNESIUM SERPL-MCNC: 2.1 MG/DL (ref 1.6–2.4)
MCH RBC QN AUTO: 31.6 PG (ref 26–34)
MCHC RBC AUTO-ENTMCNC: 34 G/DL (ref 30–36.5)
MCV RBC AUTO: 93 FL (ref 80–99)
MONOCYTES # BLD: 0.14 K/UL (ref 0–1)
MONOCYTES NFR BLD: 3 % (ref 5–13)
NEUTS BAND NFR BLD MANUAL: 17 %
NEUTS SEG # BLD: 3.77 K/UL (ref 1.8–8)
NEUTS SEG NFR BLD: 65 % (ref 32–75)
NRBC # BLD: 0 K/UL (ref 0–0.01)
NRBC BLD-RTO: 0 PER 100 WBC
PLATELET # BLD AUTO: 65 K/UL (ref 150–400)
PLATELET COMMENT: ABNORMAL
PMV BLD AUTO: 12.1 FL (ref 8.9–12.9)
POTASSIUM SERPL-SCNC: 3.8 MMOL/L (ref 3.5–5.1)
PROT SERPL-MCNC: 5.9 G/DL (ref 6.4–8.2)
PROTHROMBIN TIME: 11.4 SEC (ref 9.2–11.2)
RBC # BLD AUTO: 4.87 M/UL (ref 4.1–5.7)
RBC MORPH BLD: ABNORMAL
SARS-COV-2 RNA RESP QL NAA+PROBE: NOT DETECTED
SERVICE CMNT-IMP: ABNORMAL
SODIUM SERPL-SCNC: 136 MMOL/L (ref 136–145)
SOURCE: NORMAL
TROPONIN I SERPL HS-MCNC: 651 NG/L (ref 0–76)
TROPONIN I SERPL HS-MCNC: 739 NG/L (ref 0–76)
TROPONIN I SERPL HS-MCNC: 763 NG/L (ref 0–76)
WBC # BLD AUTO: 4.6 K/UL (ref 4.1–11.1)
WBC MORPH BLD: ABNORMAL

## 2025-05-11 PROCEDURE — 70450 CT HEAD/BRAIN W/O DYE: CPT

## 2025-05-11 PROCEDURE — 6360000004 HC RX CONTRAST MEDICATION: Performed by: STUDENT IN AN ORGANIZED HEALTH CARE EDUCATION/TRAINING PROGRAM

## 2025-05-11 PROCEDURE — 36415 COLL VENOUS BLD VENIPUNCTURE: CPT

## 2025-05-11 PROCEDURE — 80048 BASIC METABOLIC PNL TOTAL CA: CPT

## 2025-05-11 PROCEDURE — 2060000000 HC ICU INTERMEDIATE R&B

## 2025-05-11 PROCEDURE — 6370000000 HC RX 637 (ALT 250 FOR IP): Performed by: INTERNAL MEDICINE

## 2025-05-11 PROCEDURE — 87449 NOS EACH ORGANISM AG IA: CPT

## 2025-05-11 PROCEDURE — 87636 SARSCOV2 & INF A&B AMP PRB: CPT

## 2025-05-11 PROCEDURE — 84484 ASSAY OF TROPONIN QUANT: CPT

## 2025-05-11 PROCEDURE — 2580000003 HC RX 258: Performed by: STUDENT IN AN ORGANIZED HEALTH CARE EDUCATION/TRAINING PROGRAM

## 2025-05-11 PROCEDURE — 83605 ASSAY OF LACTIC ACID: CPT

## 2025-05-11 PROCEDURE — 80076 HEPATIC FUNCTION PANEL: CPT

## 2025-05-11 PROCEDURE — 83735 ASSAY OF MAGNESIUM: CPT

## 2025-05-11 PROCEDURE — 82550 ASSAY OF CK (CPK): CPT

## 2025-05-11 PROCEDURE — 71275 CT ANGIOGRAPHY CHEST: CPT

## 2025-05-11 PROCEDURE — 6360000002 HC RX W HCPCS: Performed by: INTERNAL MEDICINE

## 2025-05-11 PROCEDURE — 74177 CT ABD & PELVIS W/CONTRAST: CPT

## 2025-05-11 PROCEDURE — 2500000003 HC RX 250 WO HCPCS: Performed by: STUDENT IN AN ORGANIZED HEALTH CARE EDUCATION/TRAINING PROGRAM

## 2025-05-11 PROCEDURE — 82962 GLUCOSE BLOOD TEST: CPT

## 2025-05-11 PROCEDURE — 6360000002 HC RX W HCPCS: Performed by: STUDENT IN AN ORGANIZED HEALTH CARE EDUCATION/TRAINING PROGRAM

## 2025-05-11 PROCEDURE — 87324 CLOSTRIDIUM AG IA: CPT

## 2025-05-11 PROCEDURE — 85025 COMPLETE CBC W/AUTO DIFF WBC: CPT

## 2025-05-11 PROCEDURE — 85610 PROTHROMBIN TIME: CPT

## 2025-05-11 RX ORDER — DIAZEPAM 10 MG/2ML
5 INJECTION, SOLUTION INTRAMUSCULAR; INTRAVENOUS EVERY 4 HOURS PRN
Status: DISCONTINUED | OUTPATIENT
Start: 2025-05-11 | End: 2025-05-11

## 2025-05-11 RX ORDER — ASPIRIN 81 MG/1
81 TABLET, CHEWABLE ORAL DAILY
Status: DISCONTINUED | OUTPATIENT
Start: 2025-05-11 | End: 2025-05-19

## 2025-05-11 RX ORDER — METRONIDAZOLE 500 MG/100ML
500 INJECTION, SOLUTION INTRAVENOUS EVERY 8 HOURS
Status: DISCONTINUED | OUTPATIENT
Start: 2025-05-11 | End: 2025-05-13

## 2025-05-11 RX ORDER — CIPROFLOXACIN 2 MG/ML
400 INJECTION, SOLUTION INTRAVENOUS EVERY 12 HOURS
Status: DISCONTINUED | OUTPATIENT
Start: 2025-05-11 | End: 2025-05-13

## 2025-05-11 RX ORDER — CASTOR OIL AND BALSAM, PERU 788; 87 MG/G; MG/G
OINTMENT TOPICAL 2 TIMES DAILY
Status: DISCONTINUED | OUTPATIENT
Start: 2025-05-11 | End: 2025-05-19

## 2025-05-11 RX ORDER — IOPAMIDOL 755 MG/ML
100 INJECTION, SOLUTION INTRAVASCULAR
Status: COMPLETED | OUTPATIENT
Start: 2025-05-11 | End: 2025-05-11

## 2025-05-11 RX ORDER — LORAZEPAM 1 MG/1
2 TABLET ORAL
Status: DISCONTINUED | OUTPATIENT
Start: 2025-05-11 | End: 2025-05-11

## 2025-05-11 RX ORDER — SODIUM CHLORIDE, SODIUM LACTATE, POTASSIUM CHLORIDE, CALCIUM CHLORIDE 600; 310; 30; 20 MG/100ML; MG/100ML; MG/100ML; MG/100ML
INJECTION, SOLUTION INTRAVENOUS CONTINUOUS
Status: ACTIVE | OUTPATIENT
Start: 2025-05-11 | End: 2025-05-12

## 2025-05-11 RX ORDER — ATORVASTATIN CALCIUM 40 MG/1
80 TABLET, FILM COATED ORAL NIGHTLY
Status: DISCONTINUED | OUTPATIENT
Start: 2025-05-11 | End: 2025-05-15

## 2025-05-11 RX ORDER — FOLIC ACID 1 MG/1
1 TABLET ORAL DAILY
Status: DISCONTINUED | OUTPATIENT
Start: 2025-05-11 | End: 2025-05-19

## 2025-05-11 RX ORDER — FAMOTIDINE 20 MG/1
20 TABLET, FILM COATED ORAL DAILY
Status: DISCONTINUED | OUTPATIENT
Start: 2025-05-11 | End: 2025-05-13

## 2025-05-11 RX ORDER — ACETAMINOPHEN 650 MG/1
650 SUPPOSITORY RECTAL EVERY 4 HOURS PRN
Status: DISCONTINUED | OUTPATIENT
Start: 2025-05-11 | End: 2025-05-19 | Stop reason: HOSPADM

## 2025-05-11 RX ORDER — SODIUM CHLORIDE 0.9 % (FLUSH) 0.9 %
5-40 SYRINGE (ML) INJECTION PRN
Status: DISCONTINUED | OUTPATIENT
Start: 2025-05-11 | End: 2025-05-19

## 2025-05-11 RX ORDER — SODIUM CHLORIDE 0.9 % (FLUSH) 0.9 %
5-40 SYRINGE (ML) INJECTION EVERY 12 HOURS SCHEDULED
Status: DISCONTINUED | OUTPATIENT
Start: 2025-05-11 | End: 2025-05-19

## 2025-05-11 RX ORDER — LORAZEPAM 1 MG/1
1 TABLET ORAL
Status: DISCONTINUED | OUTPATIENT
Start: 2025-05-11 | End: 2025-05-11

## 2025-05-11 RX ORDER — AMLODIPINE BESYLATE 5 MG/1
5 TABLET ORAL DAILY
Status: DISCONTINUED | OUTPATIENT
Start: 2025-05-11 | End: 2025-05-16

## 2025-05-11 RX ORDER — SODIUM CHLORIDE 9 MG/ML
INJECTION, SOLUTION INTRAVENOUS PRN
Status: DISCONTINUED | OUTPATIENT
Start: 2025-05-11 | End: 2025-05-19

## 2025-05-11 RX ORDER — LORAZEPAM 1 MG/1
3 TABLET ORAL
Status: DISCONTINUED | OUTPATIENT
Start: 2025-05-11 | End: 2025-05-11

## 2025-05-11 RX ORDER — GAUZE BANDAGE 2" X 2"
100 BANDAGE TOPICAL DAILY
Status: DISCONTINUED | OUTPATIENT
Start: 2025-05-11 | End: 2025-05-19

## 2025-05-11 RX ORDER — LORAZEPAM 1 MG/1
4 TABLET ORAL
Status: DISCONTINUED | OUTPATIENT
Start: 2025-05-11 | End: 2025-05-11

## 2025-05-11 RX ORDER — MULTIVITAMIN WITH IRON
1 TABLET ORAL DAILY
Status: DISCONTINUED | OUTPATIENT
Start: 2025-05-11 | End: 2025-05-19

## 2025-05-11 RX ORDER — ACETAMINOPHEN 325 MG/1
650 TABLET ORAL EVERY 4 HOURS PRN
Status: DISCONTINUED | OUTPATIENT
Start: 2025-05-11 | End: 2025-05-19 | Stop reason: HOSPADM

## 2025-05-11 RX ADMIN — IOPAMIDOL 100 ML: 755 INJECTION, SOLUTION INTRAVENOUS at 10:19

## 2025-05-11 RX ADMIN — SODIUM CHLORIDE, PRESERVATIVE FREE 10 ML: 5 INJECTION INTRAVENOUS at 21:01

## 2025-05-11 RX ADMIN — METRONIDAZOLE 500 MG: 500 INJECTION, SOLUTION INTRAVENOUS at 21:47

## 2025-05-11 RX ADMIN — SODIUM CHLORIDE, PRESERVATIVE FREE 20 MG: 5 INJECTION INTRAVENOUS at 13:12

## 2025-05-11 RX ADMIN — CIPROFLOXACIN 400 MG: 2 INJECTION, SOLUTION INTRAVENOUS at 15:31

## 2025-05-11 RX ADMIN — Medication: at 21:45

## 2025-05-11 RX ADMIN — SODIUM CHLORIDE, SODIUM LACTATE, POTASSIUM CHLORIDE, AND CALCIUM CHLORIDE: .6; .31; .03; .02 INJECTION, SOLUTION INTRAVENOUS at 20:58

## 2025-05-11 RX ADMIN — SODIUM CHLORIDE, PRESERVATIVE FREE 10 ML: 5 INJECTION INTRAVENOUS at 09:00

## 2025-05-11 RX ADMIN — ACETAMINOPHEN 650 MG: 650 SUPPOSITORY RECTAL at 14:15

## 2025-05-11 RX ADMIN — METRONIDAZOLE 500 MG: 500 INJECTION, SOLUTION INTRAVENOUS at 14:17

## 2025-05-11 RX ADMIN — SODIUM CHLORIDE, SODIUM LACTATE, POTASSIUM CHLORIDE, AND CALCIUM CHLORIDE: .6; .31; .03; .02 INJECTION, SOLUTION INTRAVENOUS at 06:30

## 2025-05-11 RX ADMIN — Medication: at 12:55

## 2025-05-11 ASSESSMENT — PAIN SCALES - GENERAL
PAINLEVEL_OUTOF10: 0

## 2025-05-11 NOTE — ED NOTES
Bed Assignment:   [facility] #[id number]    Number for Report:   [phone]

## 2025-05-11 NOTE — PROGRESS NOTES
Contacted [facility] regarding transfer of pt to [facility] #[id number], spoke with Stu, arranged for ALS transport with cardiac monitor, saline lock, no isolation, and with the patient on RA. Insurance information, ht/wt, and primary diagnosis provided. Received a 0500 ETA. Amaya, ED charge RN made aware of ETA.    2120 Updated ETA 0230, ED associates updated.

## 2025-05-11 NOTE — ED NOTES
TRANSFER - OUT REPORT:    Verbal report given to Mei Parr RN on Aubrey Bell III  being transferred to [facility] for routine progression of patient care       Report consisted of patient's Situation, Background, Assessment and   Recommendations(SBAR).     Information from the following report(s) Nurse Handoff Report, ED Encounter Summary, MAR, Recent Results, Cardiac Rhythm NSR, and Neuro Assessment was reviewed with the receiving nurse.    Rhodelia Fall Assessment:    Presents to emergency department  because of falls (Syncope, seizure, or loss of consciousness): No  Age > 70: Yes  Altered Mental Status, Intoxication with alcohol or substance confusion (Disorientation, impaired judgment, poor safety awaremess, or inability to follow instructions): No  Impaired Mobility: Ambulates or transfers with assistive devices or assistance; Unable to ambulate or transer.: Yes  Nursing Judgement: Yes          Lines:   Peripheral IV 05/10/25 Left Antecubital (Active)   Site Assessment Clean, dry & intact 05/10/25 1612   Line Status Blood return noted;Flushed 05/10/25 1612   Line Care Connections checked and tightened 05/10/25 1612   Phlebitis Assessment No symptoms 05/10/25 1612   Infiltration Assessment 0 05/10/25 1612   Dressing Status Clean, dry & intact 05/10/25 1612   Dressing Type Transparent 05/10/25 1612   Dressing Intervention New 05/10/25 1612        Opportunity for questions and clarification was provided.      Patient transported with:  Monitor

## 2025-05-11 NOTE — ED NOTES
After MD's conversation with cardiology, decision was made to stop Heparin drip d/t decreased platelet count. Heparin stopped per order @3560.

## 2025-05-11 NOTE — ED NOTES
Updated patient's daughter Glenny via phone call regarding this patient going to [facility] room #8284

## 2025-05-11 NOTE — H&P
Hospitalist Admission Note    NAME:  Aubrey Bell III   :  1935   MRN:  [id number]     Date/Time:  2025 5:26 AM    Patient PCP: Turner Cid MD    ______________________________________________________________________  Given the patient's current clinical presentation, I have a high level of concern for decompensation if discharged from the emergency department.  Complex decision making was performed, which includes reviewing the patient's available past medical records, laboratory results, and x-ray films.       My assessment of this patient's clinical condition and my plan of care is as follows.    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  Chemotherapy-induced peripheral neuropathy  History of alcohol abuse    Plan:  Acute encephalopathy  OLGA, prerenal etiology  Diarrhea  Admit to telemetry monitoring  Delirium precautions  Trend renal function  Renally dose medications avoid nephrotoxic agents  Lactated Ringer's maintenance fluids  Anticipate potential need for facility placement  Family unavailable at time of encounter will need to clarify code preference and goals of care  - Palliative care consulted for assistance, greatly appreciate their expertise-suspect slowly progressing failure to thrive  Check CT head, CT abdomen pelvis  Urinalysis not consistent with UTI  CXR without evidence of pneumonia  Check COVID and flu    Elevated troponin-demand ischemia suspected  Trend troponin-suspect demand ischemia  Recommended against heparin infusion in absence of ECG changes or complaint of chest pain given moderate thrombocytopenia and bleeding risk  Cardiology consulted,

## 2025-05-12 ENCOUNTER — APPOINTMENT (OUTPATIENT)
Facility: HOSPITAL | Age: 89
DRG: 870 | End: 2025-05-12
Attending: STUDENT IN AN ORGANIZED HEALTH CARE EDUCATION/TRAINING PROGRAM
Payer: MEDICARE

## 2025-05-12 ENCOUNTER — TELEPHONE (OUTPATIENT)
Age: 89
End: 2025-05-12

## 2025-05-12 LAB
ALBUMIN SERPL-MCNC: 2.3 G/DL (ref 3.5–5)
ALBUMIN SERPL-MCNC: 2.8 G/DL (ref 3.5–5)
ALBUMIN/GLOB SERPL: 0.9 (ref 1.1–2.2)
ALBUMIN/GLOB SERPL: 1.1 (ref 1.1–2.2)
ALP SERPL-CCNC: 51 U/L (ref 45–117)
ALP SERPL-CCNC: 57 U/L (ref 45–117)
ALT SERPL-CCNC: 48 U/L (ref 12–78)
ALT SERPL-CCNC: 56 U/L (ref 12–78)
ANION GAP SERPL CALC-SCNC: 5 MMOL/L (ref 2–12)
ANION GAP SERPL CALC-SCNC: 6 MMOL/L (ref 2–12)
ARTERIAL PATENCY WRIST A: POSITIVE
ARTERIAL PATENCY WRIST A: POSITIVE
AST SERPL-CCNC: 229 U/L (ref 15–37)
AST SERPL-CCNC: 250 U/L (ref 15–37)
BASE DEFICIT BLD-SCNC: 21.4 MMOL/L
BASE DEFICIT BLD-SCNC: 3.9 MMOL/L
BASOPHILS # BLD: 0 K/UL (ref 0–0.1)
BASOPHILS NFR BLD: 0 % (ref 0–1)
BDY SITE: ABNORMAL
BDY SITE: ABNORMAL
BILIRUB SERPL-MCNC: 0.4 MG/DL (ref 0.2–1)
BILIRUB SERPL-MCNC: 0.5 MG/DL (ref 0.2–1)
BUN SERPL-MCNC: 40 MG/DL (ref 6–20)
BUN SERPL-MCNC: 41 MG/DL (ref 6–20)
BUN/CREAT SERPL: 29 (ref 12–20)
BUN/CREAT SERPL: 31 (ref 12–20)
CALCIUM SERPL-MCNC: 7.8 MG/DL (ref 8.5–10.1)
CALCIUM SERPL-MCNC: 8.4 MG/DL (ref 8.5–10.1)
CHLORIDE SERPL-SCNC: 108 MMOL/L (ref 97–108)
CHLORIDE SERPL-SCNC: 116 MMOL/L (ref 97–108)
CK SERPL-CCNC: 567 U/L (ref 39–308)
CO2 SERPL-SCNC: 22 MMOL/L (ref 21–32)
CO2 SERPL-SCNC: 26 MMOL/L (ref 21–32)
CREAT SERPL-MCNC: 1.27 MG/DL (ref 0.7–1.3)
CREAT SERPL-MCNC: 1.4 MG/DL (ref 0.7–1.3)
DIFFERENTIAL METHOD BLD: ABNORMAL
EKG DIAGNOSIS: NORMAL
EKG Q-T INTERVAL: 334 MS
EKG QRS DURATION: 96 MS
EKG QTC CALCULATION (BAZETT): 472 MS
EKG R AXIS: 52 DEGREES
EKG T AXIS: 23 DEGREES
EKG VENTRICULAR RATE: 120 BPM
EOSINOPHIL # BLD: 0.13 K/UL (ref 0–0.4)
EOSINOPHIL NFR BLD: 2 % (ref 0–7)
ERYTHROCYTE [DISTWIDTH] IN BLOOD BY AUTOMATED COUNT: 15.3 % (ref 11.5–14.5)
ERYTHROCYTE [DISTWIDTH] IN BLOOD BY AUTOMATED COUNT: 15.6 % (ref 11.5–14.5)
GAS FLOW.O2 O2 DELIVERY SYS: ABNORMAL
GAS FLOW.O2 SETTING OXYMISER: 14 BPM
GLOBULIN SER CALC-MCNC: 2.6 G/DL (ref 2–4)
GLOBULIN SER CALC-MCNC: 2.7 G/DL (ref 2–4)
GLUCOSE BLD STRIP.AUTO-MCNC: 131 MG/DL (ref 65–117)
GLUCOSE SERPL-MCNC: 108 MG/DL (ref 65–100)
GLUCOSE SERPL-MCNC: 161 MG/DL (ref 65–100)
HCO3 BLD-SCNC: 18.9 MMOL/L (ref 21–28)
HCO3 BLD-SCNC: 4.5 MMOL/L (ref 21–28)
HCT VFR BLD AUTO: 44.1 % (ref 36.6–50.3)
HCT VFR BLD AUTO: 44.4 % (ref 36.6–50.3)
HGB BLD-MCNC: 14.8 G/DL (ref 12.1–17)
HGB BLD-MCNC: 14.8 G/DL (ref 12.1–17)
IMM GRANULOCYTES # BLD AUTO: 0 K/UL (ref 0–0.04)
IMM GRANULOCYTES NFR BLD AUTO: 0 % (ref 0–0.5)
LACTATE SERPL-SCNC: 0.7 MMOL/L (ref 0.4–2)
LACTATE SERPL-SCNC: 2.2 MMOL/L (ref 0.4–2)
LYMPHOCYTES # BLD: 0.78 K/UL (ref 0.8–3.5)
LYMPHOCYTES NFR BLD: 12 % (ref 12–49)
MAGNESIUM SERPL-MCNC: 2 MG/DL (ref 1.6–2.4)
MAGNESIUM SERPL-MCNC: 2 MG/DL (ref 1.6–2.4)
MCH RBC QN AUTO: 31.2 PG (ref 26–34)
MCH RBC QN AUTO: 31.2 PG (ref 26–34)
MCHC RBC AUTO-ENTMCNC: 33.3 G/DL (ref 30–36.5)
MCHC RBC AUTO-ENTMCNC: 33.6 G/DL (ref 30–36.5)
MCV RBC AUTO: 93 FL (ref 80–99)
MCV RBC AUTO: 93.5 FL (ref 80–99)
MONOCYTES # BLD: 0.65 K/UL (ref 0–1)
MONOCYTES NFR BLD: 10 % (ref 5–13)
NEUTS SEG # BLD: 4.94 K/UL (ref 1.8–8)
NEUTS SEG NFR BLD: 76 % (ref 32–75)
NRBC # BLD: 0 K/UL (ref 0–0.01)
NRBC # BLD: 0 K/UL (ref 0–0.01)
NRBC BLD-RTO: 0 PER 100 WBC
NRBC BLD-RTO: 0 PER 100 WBC
O2/TOTAL GAS SETTING VFR VENT: 100 %
O2/TOTAL GAS SETTING VFR VENT: 2 %
PCO2 BLD: 10.4 MMHG (ref 35–48)
PCO2 BLD: 27.4 MMHG (ref 35–48)
PEEP RESPIRATORY: 6 CMH2O
PH BLD: 7.25 (ref 7.35–7.45)
PH BLD: 7.45 (ref 7.35–7.45)
PHOSPHATE SERPL-MCNC: 2.2 MG/DL (ref 2.6–4.7)
PHOSPHATE SERPL-MCNC: 3.3 MG/DL (ref 2.6–4.7)
PLATELET # BLD AUTO: 65 K/UL (ref 150–400)
PLATELET # BLD AUTO: 67 K/UL (ref 150–400)
PLATELET COMMENT: ABNORMAL
PMV BLD AUTO: 11.5 FL (ref 8.9–12.9)
PMV BLD AUTO: 11.7 FL (ref 8.9–12.9)
PO2 BLD: 54 MMHG (ref 83–108)
PO2 BLD: 79 MMHG (ref 83–108)
POTASSIUM SERPL-SCNC: 3.7 MMOL/L (ref 3.5–5.1)
POTASSIUM SERPL-SCNC: 4.3 MMOL/L (ref 3.5–5.1)
PROT SERPL-MCNC: 5 G/DL (ref 6.4–8.2)
PROT SERPL-MCNC: 5.4 G/DL (ref 6.4–8.2)
RBC # BLD AUTO: 4.74 M/UL (ref 4.1–5.7)
RBC # BLD AUTO: 4.75 M/UL (ref 4.1–5.7)
RBC MORPH BLD: ABNORMAL
SAO2 % BLD: 83.8 % (ref 92–97)
SAO2 % BLD: 96.3 % (ref 92–97)
SERVICE CMNT-IMP: ABNORMAL
SODIUM SERPL-SCNC: 140 MMOL/L (ref 136–145)
SODIUM SERPL-SCNC: 143 MMOL/L (ref 136–145)
SPECIMEN TYPE: ABNORMAL
SPECIMEN TYPE: ABNORMAL
TROPONIN I SERPL HS-MCNC: 856 NG/L (ref 0–76)
VENTILATION MODE VENT: ABNORMAL
VT SETTING VENT: 400 ML
WBC # BLD AUTO: 4.8 K/UL (ref 4.1–11.1)
WBC # BLD AUTO: 6.5 K/UL (ref 4.1–11.1)

## 2025-05-12 PROCEDURE — 85027 COMPLETE CBC AUTOMATED: CPT

## 2025-05-12 PROCEDURE — 6360000002 HC RX W HCPCS: Performed by: INTERNAL MEDICINE

## 2025-05-12 PROCEDURE — 6360000004 HC RX CONTRAST MEDICATION: Performed by: INTERNAL MEDICINE

## 2025-05-12 PROCEDURE — 82803 BLOOD GASES ANY COMBINATION: CPT

## 2025-05-12 PROCEDURE — 80053 COMPREHEN METABOLIC PANEL: CPT

## 2025-05-12 PROCEDURE — 2000000000 HC ICU R&B

## 2025-05-12 PROCEDURE — 36600 WITHDRAWAL OF ARTERIAL BLOOD: CPT

## 2025-05-12 PROCEDURE — 31500 INSERT EMERGENCY AIRWAY: CPT

## 2025-05-12 PROCEDURE — 2500000003 HC RX 250 WO HCPCS: Performed by: NURSE PRACTITIONER

## 2025-05-12 PROCEDURE — 99497 ADVNCD CARE PLAN 30 MIN: CPT

## 2025-05-12 PROCEDURE — 2500000003 HC RX 250 WO HCPCS: Performed by: STUDENT IN AN ORGANIZED HEALTH CARE EDUCATION/TRAINING PROGRAM

## 2025-05-12 PROCEDURE — 70450 CT HEAD/BRAIN W/O DYE: CPT

## 2025-05-12 PROCEDURE — 2580000003 HC RX 258: Performed by: INTERNAL MEDICINE

## 2025-05-12 PROCEDURE — 2580000003 HC RX 258: Performed by: NURSE PRACTITIONER

## 2025-05-12 PROCEDURE — 99223 1ST HOSP IP/OBS HIGH 75: CPT

## 2025-05-12 PROCEDURE — 2580000003 HC RX 258: Performed by: STUDENT IN AN ORGANIZED HEALTH CARE EDUCATION/TRAINING PROGRAM

## 2025-05-12 PROCEDURE — 70498 CT ANGIOGRAPHY NECK: CPT

## 2025-05-12 PROCEDURE — 6360000002 HC RX W HCPCS

## 2025-05-12 PROCEDURE — 51702 INSERT TEMP BLADDER CATH: CPT

## 2025-05-12 PROCEDURE — 2500000003 HC RX 250 WO HCPCS: Performed by: INTERNAL MEDICINE

## 2025-05-12 PROCEDURE — 6360000002 HC RX W HCPCS: Performed by: STUDENT IN AN ORGANIZED HEALTH CARE EDUCATION/TRAINING PROGRAM

## 2025-05-12 PROCEDURE — 85025 COMPLETE CBC W/AUTO DIFF WBC: CPT

## 2025-05-12 PROCEDURE — 74018 RADEX ABDOMEN 1 VIEW: CPT

## 2025-05-12 PROCEDURE — 3E033XZ INTRODUCTION OF VASOPRESSOR INTO PERIPHERAL VEIN, PERCUTANEOUS APPROACH: ICD-10-PCS | Performed by: NURSE PRACTITIONER

## 2025-05-12 PROCEDURE — 84100 ASSAY OF PHOSPHORUS: CPT

## 2025-05-12 PROCEDURE — 5A1955Z RESPIRATORY VENTILATION, GREATER THAN 96 CONSECUTIVE HOURS: ICD-10-PCS | Performed by: NURSE PRACTITIONER

## 2025-05-12 PROCEDURE — 84484 ASSAY OF TROPONIN QUANT: CPT

## 2025-05-12 PROCEDURE — 87040 BLOOD CULTURE FOR BACTERIA: CPT

## 2025-05-12 PROCEDURE — 92610 EVALUATE SWALLOWING FUNCTION: CPT

## 2025-05-12 PROCEDURE — 71045 X-RAY EXAM CHEST 1 VIEW: CPT

## 2025-05-12 PROCEDURE — 0BH17EZ INSERTION OF ENDOTRACHEAL AIRWAY INTO TRACHEA, VIA NATURAL OR ARTIFICIAL OPENING: ICD-10-PCS | Performed by: NURSE PRACTITIONER

## 2025-05-12 PROCEDURE — 36415 COLL VENOUS BLD VENIPUNCTURE: CPT

## 2025-05-12 PROCEDURE — 82962 GLUCOSE BLOOD TEST: CPT

## 2025-05-12 PROCEDURE — 82550 ASSAY OF CK (CPK): CPT

## 2025-05-12 PROCEDURE — 6360000002 HC RX W HCPCS: Performed by: NURSE PRACTITIONER

## 2025-05-12 PROCEDURE — 93005 ELECTROCARDIOGRAM TRACING: CPT | Performed by: STUDENT IN AN ORGANIZED HEALTH CARE EDUCATION/TRAINING PROGRAM

## 2025-05-12 PROCEDURE — 94002 VENT MGMT INPAT INIT DAY: CPT

## 2025-05-12 PROCEDURE — 6370000000 HC RX 637 (ALT 250 FOR IP): Performed by: INTERNAL MEDICINE

## 2025-05-12 PROCEDURE — 83605 ASSAY OF LACTIC ACID: CPT

## 2025-05-12 PROCEDURE — 83735 ASSAY OF MAGNESIUM: CPT

## 2025-05-12 RX ORDER — LEVETIRACETAM 500 MG/5ML
1500 INJECTION, SOLUTION, CONCENTRATE INTRAVENOUS ONCE
Status: DISCONTINUED | OUTPATIENT
Start: 2025-05-12 | End: 2025-05-12

## 2025-05-12 RX ORDER — PROPOFOL 10 MG/ML
5-50 INJECTION, EMULSION INTRAVENOUS CONTINUOUS
Status: DISCONTINUED | OUTPATIENT
Start: 2025-05-12 | End: 2025-05-13

## 2025-05-12 RX ORDER — 0.9 % SODIUM CHLORIDE 0.9 %
1000 INTRAVENOUS SOLUTION INTRAVENOUS ONCE
Status: COMPLETED | OUTPATIENT
Start: 2025-05-12 | End: 2025-05-12

## 2025-05-12 RX ORDER — PROPOFOL 10 MG/ML
INJECTION, EMULSION INTRAVENOUS
Status: COMPLETED
Start: 2025-05-12 | End: 2025-05-12

## 2025-05-12 RX ORDER — IPRATROPIUM BROMIDE AND ALBUTEROL SULFATE 2.5; .5 MG/3ML; MG/3ML
1 SOLUTION RESPIRATORY (INHALATION) ONCE
Status: COMPLETED | OUTPATIENT
Start: 2025-05-12 | End: 2025-05-13

## 2025-05-12 RX ORDER — LORAZEPAM 2 MG/ML
2 INJECTION INTRAMUSCULAR ONCE
Status: DISCONTINUED | OUTPATIENT
Start: 2025-05-12 | End: 2025-05-13 | Stop reason: SDUPTHER

## 2025-05-12 RX ORDER — 0.9 % SODIUM CHLORIDE 0.9 %
1000 INTRAVENOUS SOLUTION INTRAVENOUS ONCE
Status: COMPLETED | OUTPATIENT
Start: 2025-05-13 | End: 2025-05-13

## 2025-05-12 RX ORDER — SODIUM CHLORIDE 9 MG/ML
INJECTION, SOLUTION INTRAVENOUS CONTINUOUS
Status: DISCONTINUED | OUTPATIENT
Start: 2025-05-12 | End: 2025-05-13

## 2025-05-12 RX ORDER — NOREPINEPHRINE BITARTRATE 0.06 MG/ML
.5-2 INJECTION, SOLUTION INTRAVENOUS CONTINUOUS
Status: DISCONTINUED | OUTPATIENT
Start: 2025-05-12 | End: 2025-05-13

## 2025-05-12 RX ORDER — IPRATROPIUM BROMIDE AND ALBUTEROL SULFATE 2.5; .5 MG/3ML; MG/3ML
1 SOLUTION RESPIRATORY (INHALATION) EVERY 6 HOURS PRN
Status: DISCONTINUED | OUTPATIENT
Start: 2025-05-12 | End: 2025-05-19

## 2025-05-12 RX ORDER — LEVETIRACETAM 500 MG/5ML
2000 INJECTION, SOLUTION, CONCENTRATE INTRAVENOUS ONCE
Status: COMPLETED | OUTPATIENT
Start: 2025-05-12 | End: 2025-05-12

## 2025-05-12 RX ORDER — DIAZEPAM 10 MG/2ML
5 INJECTION, SOLUTION INTRAMUSCULAR; INTRAVENOUS ONCE
Status: DISCONTINUED | OUTPATIENT
Start: 2025-05-12 | End: 2025-05-12

## 2025-05-12 RX ORDER — IOPAMIDOL 755 MG/ML
100 INJECTION, SOLUTION INTRAVASCULAR
Status: COMPLETED | OUTPATIENT
Start: 2025-05-12 | End: 2025-05-12

## 2025-05-12 RX ORDER — LEVETIRACETAM 500 MG/5ML
750 INJECTION, SOLUTION, CONCENTRATE INTRAVENOUS EVERY 12 HOURS
Status: DISCONTINUED | OUTPATIENT
Start: 2025-05-13 | End: 2025-05-19

## 2025-05-12 RX ORDER — LORAZEPAM 2 MG/ML
INJECTION INTRAMUSCULAR
Status: COMPLETED
Start: 2025-05-12 | End: 2025-05-12

## 2025-05-12 RX ADMIN — IOPAMIDOL 100 ML: 755 INJECTION, SOLUTION INTRAVENOUS at 23:17

## 2025-05-12 RX ADMIN — SODIUM CHLORIDE 1000 ML: 0.9 INJECTION, SOLUTION INTRAVENOUS at 16:56

## 2025-05-12 RX ADMIN — SODIUM CHLORIDE, PRESERVATIVE FREE 10 ML: 5 INJECTION INTRAVENOUS at 08:28

## 2025-05-12 RX ADMIN — METRONIDAZOLE 500 MG: 500 INJECTION, SOLUTION INTRAVENOUS at 06:33

## 2025-05-12 RX ADMIN — AMIODARONE HYDROCHLORIDE 150 MG: 1.5 INJECTION, SOLUTION INTRAVENOUS at 23:00

## 2025-05-12 RX ADMIN — AMIODARONE HYDROCHLORIDE 1 MG/MIN: 50 INJECTION, SOLUTION INTRAVENOUS at 23:50

## 2025-05-12 RX ADMIN — LORAZEPAM 2 MG: 2 INJECTION INTRAMUSCULAR; INTRAVENOUS at 21:24

## 2025-05-12 RX ADMIN — SODIUM CHLORIDE 5 MG/HR: 900 INJECTION, SOLUTION INTRAVENOUS at 03:48

## 2025-05-12 RX ADMIN — ACETAMINOPHEN 650 MG: 650 SUPPOSITORY RECTAL at 08:31

## 2025-05-12 RX ADMIN — Medication 5 MCG/MIN: at 22:45

## 2025-05-12 RX ADMIN — SODIUM CHLORIDE 1000 ML: 0.9 INJECTION, SOLUTION INTRAVENOUS at 23:39

## 2025-05-12 RX ADMIN — LEVETIRACETAM 2000 MG: 100 INJECTION INTRAVENOUS at 21:41

## 2025-05-12 RX ADMIN — Medication: at 08:31

## 2025-05-12 RX ADMIN — SODIUM CHLORIDE: 0.9 INJECTION, SOLUTION INTRAVENOUS at 13:07

## 2025-05-12 RX ADMIN — CIPROFLOXACIN 400 MG: 2 INJECTION, SOLUTION INTRAVENOUS at 02:02

## 2025-05-12 RX ADMIN — CIPROFLOXACIN 400 MG: 2 INJECTION, SOLUTION INTRAVENOUS at 15:43

## 2025-05-12 RX ADMIN — METRONIDAZOLE 500 MG: 500 INJECTION, SOLUTION INTRAVENOUS at 15:40

## 2025-05-12 RX ADMIN — PROPOFOL 20 MCG/KG/MIN: 10 INJECTION, EMULSION INTRAVENOUS at 23:00

## 2025-05-12 RX ADMIN — ACETAMINOPHEN 650 MG: 650 SUPPOSITORY RECTAL at 15:59

## 2025-05-12 RX ADMIN — SODIUM CHLORIDE, PRESERVATIVE FREE 20 MG: 5 INJECTION INTRAVENOUS at 08:26

## 2025-05-12 RX ADMIN — SODIUM PHOSPHATE, MONOBASIC, MONOHYDRATE AND SODIUM PHOSPHATE, DIBASIC, ANHYDROUS 30 MMOL: 276; 142 INJECTION, SOLUTION INTRAVENOUS at 12:01

## 2025-05-12 ASSESSMENT — PAIN SCALES - GENERAL
PAINLEVEL_OUTOF10: 0
PAINLEVEL_OUTOF10: 0

## 2025-05-12 ASSESSMENT — PULMONARY FUNCTION TESTS
PIF_VALUE: 22
PIF_VALUE: 22

## 2025-05-12 NOTE — TELEPHONE ENCOUNTER
Bethany Holden is going to put a consult in for this patient.    Ordee persistent fever and sepsis    Bethany # [phone]

## 2025-05-12 NOTE — ACP (ADVANCE CARE PLANNING)
Advance Care Planning      Palliative Medicine Provider (MD/NP)  Advance Care Planning (ACP) Conversation      Date of Conversation: 05/12/25  The patient and/or authorized decision maker consented to a voluntary Advance Care Planning conversation.   Individuals present for the conversation:   Patient, Spouse Fanny, and Daughter Glenny    Legal Healthcare Agent(s):    Primary Decision Maker: Fanny Bell - Spouse - [phone]    ACP documents available in EMR prior to discussion:  -None    Primary Palliative Diagnosis(es):  Failure to Thrive    Conversation Summary:  Code Status:  -We talked about resuscitation status, and you have elected “Do Not Attempt Resuscitation” status     Discussed that a resuscitation attempt is unlikely to be effective or beneficial for patient and that having a DNR would allow him to pass as naturally and as comfortably as possible without the pain of CPR.      Resuscitation Status:    Code Status: DNR    Outcomes / Completed Documentation:  An explanation of advance directives and their importance was provided and the following forms completed:    -No new documents completed.    If new document completed, original was provided to patient and/or family member.    Copy was placed for scanning into the [facility] EMR.      I spent 30 minutes providing separately identifiable ACP services with the patient and/or surrogate decision maker in a voluntary, in-person conversation discussing the patient's wishes and goals as detailed in the above note.       SHABBIR Mejias - CNP

## 2025-05-12 NOTE — CARE COORDINATION
CM Note:  Discontinued the \"rehab\" order for this patient. If things change and he is able to tolerate will reapproach services for this patient. CM will approach family for admission data for CM tomorrow.   English Salvador MAYS CM [phone]

## 2025-05-12 NOTE — SIGNIFICANT EVENT
RAPID RESPONSE TEAM NOTE:    5/12/25 0355 - ([facility] 2143) Afib RVR    Assessment:    Pt admitted 5/11/25 with acute encephalopathy, OLGA, and elevated troponin.  Code status - Full    Saw pt on request of Dr Grier. Pt with new onset afib RVR 130s. Otherwise VSS. No acute distress. Opens eyes to voice but nonverbal and does not follow commands. Discussed with Dr Grier. Pt mental status was the same on admission yesterday. Pt has pending palliative consult.    Neuro - Open eyes, nonverbal, not following commands  Cardiac - afib 110-120s  Respiratory - clear, no distress, RA  GI/ - NPO for failed swallow eval, 400ml urine in canister   Lines/Drains - PIV x2  Gtts - LR 100ml, Cardizem 5mg    Vital signs as follows: /65 map 74, , RR 35, Sat 98% RA, Temp 97.8     Sepsis Screen - negative    Interventions:    Primary RN already obtained orders for the following:  - Send am labs: CBC, BMP, Mag  - EKG  - Cardizem drip    Spoke with Dr Grier, received orders for:    - extend LR infusion duration for the next 5 hours then day team can reassess    Outcome:    Patient to remain in room at this time.      Please call with any questions or concerns.  Glenny Hernandez RN  RRT [phone]

## 2025-05-13 ENCOUNTER — APPOINTMENT (OUTPATIENT)
Facility: HOSPITAL | Age: 89
DRG: 870 | End: 2025-05-13
Attending: INTERNAL MEDICINE
Payer: MEDICARE

## 2025-05-13 ENCOUNTER — APPOINTMENT (OUTPATIENT)
Facility: HOSPITAL | Age: 89
DRG: 870 | End: 2025-05-13
Attending: STUDENT IN AN ORGANIZED HEALTH CARE EDUCATION/TRAINING PROGRAM
Payer: MEDICARE

## 2025-05-13 PROBLEM — R56.9 SEIZURE (HCC): Status: ACTIVE | Noted: 2025-05-13

## 2025-05-13 PROBLEM — R40.4 UNRESPONSIVE EPISODE: Status: ACTIVE | Noted: 2025-05-13

## 2025-05-13 LAB
ALBUMIN SERPL-MCNC: 2.2 G/DL (ref 3.5–5)
ALBUMIN SERPL-MCNC: 2.5 G/DL (ref 3.5–5)
ALBUMIN SERPL-MCNC: <0.6 G/DL (ref 3.5–5)
ALBUMIN/GLOB SERPL: 1.1 (ref 1.1–2.2)
ALBUMIN/GLOB SERPL: ABNORMAL (ref 1.1–2.2)
ALP SERPL-CCNC: 41 U/L (ref 45–117)
ALP SERPL-CCNC: ABNORMAL U/L (ref 45–117)
ALT SERPL-CCNC: 38 U/L (ref 12–78)
ALT SERPL-CCNC: ABNORMAL U/L (ref 12–78)
ANION GAP SERPL CALC-SCNC: 10 MMOL/L (ref 2–12)
ANION GAP SERPL CALC-SCNC: 8 MMOL/L (ref 2–12)
ANION GAP SERPL CALC-SCNC: 9 MMOL/L (ref 2–12)
APPEARANCE UR: ABNORMAL
ARTERIAL PATENCY WRIST A: POSITIVE
ARTERIAL PATENCY WRIST A: YES
AST SERPL-CCNC: 220 U/L (ref 15–37)
AST SERPL-CCNC: ABNORMAL U/L (ref 15–37)
BACTERIA URNS QL MICRO: NEGATIVE /HPF
BASE DEFICIT BLD-SCNC: 7 MMOL/L
BDY SITE: ABNORMAL
BDY SITE: ABNORMAL
BILIRUB DIRECT SERPL-MCNC: ABNORMAL MG/DL (ref 0–0.2)
BILIRUB SERPL-MCNC: 0.6 MG/DL (ref 0.2–1)
BILIRUB SERPL-MCNC: ABNORMAL MG/DL (ref 0.2–1)
BILIRUB UR QL: NEGATIVE
BUN SERPL-MCNC: 31 MG/DL (ref 6–20)
BUN SERPL-MCNC: 38 MG/DL (ref 6–20)
BUN SERPL-MCNC: 41 MG/DL (ref 6–20)
BUN/CREAT SERPL: 22 (ref 12–20)
BUN/CREAT SERPL: 23 (ref 12–20)
BUN/CREAT SERPL: 31 (ref 12–20)
CALCIUM SERPL-MCNC: 5.2 MG/DL (ref 8.5–10.1)
CALCIUM SERPL-MCNC: 7.2 MG/DL (ref 8.5–10.1)
CALCIUM SERPL-MCNC: 7.3 MG/DL (ref 8.5–10.1)
CHLORIDE SERPL-SCNC: 111 MMOL/L (ref 97–108)
CHLORIDE SERPL-SCNC: 117 MMOL/L (ref 97–108)
CHLORIDE SERPL-SCNC: 121 MMOL/L (ref 97–108)
CO2 SERPL-SCNC: 12 MMOL/L (ref 21–32)
CO2 SERPL-SCNC: 20 MMOL/L (ref 21–32)
CO2 SERPL-SCNC: 22 MMOL/L (ref 21–32)
COLOR UR: ABNORMAL
CREAT SERPL-MCNC: 0.99 MG/DL (ref 0.7–1.3)
CREAT SERPL-MCNC: 1.68 MG/DL (ref 0.7–1.3)
CREAT SERPL-MCNC: 1.83 MG/DL (ref 0.7–1.3)
D DIMER PPP FEU-MCNC: 1.31 MG/L FEU (ref 0–0.65)
ECHO BSA: 1.66 M2
ECHO LV EF PHYSICIAN: 55 %
ECHO LV FRACTIONAL SHORTENING: 48 % (ref 28–44)
ECHO LV INTERNAL DIMENSION DIASTOLE INDEX: 2.32 CM/M2
ECHO LV INTERNAL DIMENSION DIASTOLIC: 4.2 CM (ref 4.2–5.9)
ECHO LV INTERNAL DIMENSION SYSTOLIC INDEX: 1.22 CM/M2
ECHO LV INTERNAL DIMENSION SYSTOLIC: 2.2 CM
ECHO LV IVSD: 1 CM (ref 0.6–1)
ECHO LV MASS 2D: 137.2 G (ref 88–224)
ECHO LV MASS INDEX 2D: 75.8 G/M2 (ref 49–115)
ECHO LV POSTERIOR WALL DIASTOLIC: 1 CM (ref 0.6–1)
ECHO LV RELATIVE WALL THICKNESS RATIO: 0.48
ECHO LVOT AREA: 3.1 CM2
ECHO LVOT DIAM: 2 CM
ECHO TV REGURGITANT MAX VELOCITY: 1.31 M/S
ECHO TV REGURGITANT PEAK GRADIENT: 7 MMHG
EPITH CASTS URNS QL MICRO: ABNORMAL /LPF
ERYTHROCYTE [DISTWIDTH] IN BLOOD BY AUTOMATED COUNT: 16.1 % (ref 11.5–14.5)
FIBRINOGEN PPP-MCNC: 174 MG/DL (ref 200–475)
FIO2 ON VENT: 100 %
GAS FLOW.O2 SETTING OXYMISER: 14
GAS FLOW.O2 SETTING OXYMISER: 14 BPM
GLOBULIN SER CALC-MCNC: 2.2 G/DL (ref 2–4)
GLOBULIN SER CALC-MCNC: ABNORMAL G/DL (ref 2–4)
GLUCOSE BLD STRIP.AUTO-MCNC: 173 MG/DL (ref 65–117)
GLUCOSE SERPL-MCNC: 137 MG/DL (ref 65–100)
GLUCOSE SERPL-MCNC: 177 MG/DL (ref 65–100)
GLUCOSE SERPL-MCNC: 227 MG/DL (ref 65–100)
GLUCOSE UR STRIP.AUTO-MCNC: NEGATIVE MG/DL
HCO3 BLD-SCNC: 16.8 MMOL/L (ref 21–28)
HCT VFR BLD AUTO: 36.7 % (ref 36.6–50.3)
HGB BLD-MCNC: 11.8 G/DL (ref 12.1–17)
HGB UR QL STRIP: ABNORMAL
HYALINE CASTS URNS QL MICRO: ABNORMAL /LPF (ref 0–5)
KETONES UR QL STRIP.AUTO: NEGATIVE MG/DL
LACTATE SERPL-SCNC: 4 MMOL/L (ref 0.4–2)
LEUKOCYTE ESTERASE UR QL STRIP.AUTO: NEGATIVE
MAGNESIUM SERPL-MCNC: 1.5 MG/DL (ref 1.6–2.4)
MCH RBC QN AUTO: 31.1 PG (ref 26–34)
MCHC RBC AUTO-ENTMCNC: 32.2 G/DL (ref 30–36.5)
MCV RBC AUTO: 96.8 FL (ref 80–99)
NITRITE UR QL STRIP.AUTO: NEGATIVE
NRBC # BLD: 0.02 K/UL (ref 0–0.01)
NRBC BLD-RTO: 0.3 PER 100 WBC
O2/TOTAL GAS SETTING VFR VENT: 100 %
PCO2 BLD: 28.2 MMHG (ref 35–48)
PCO2 BLDA: <15 MMHG (ref 35–45)
PEEP RESPIRATORY: 6
PEEP RESPIRATORY: 6 CMH2O
PH BLD: 7.38 (ref 7.35–7.45)
PH BLDA: 7.29 (ref 7.35–7.45)
PH UR STRIP: 5 (ref 5–8)
PHOSPHATE SERPL-MCNC: 2.3 MG/DL (ref 2.6–4.7)
PHOSPHATE SERPL-MCNC: 2.3 MG/DL (ref 2.6–4.7)
PLATELET # BLD AUTO: 59 K/UL (ref 150–400)
PMV BLD AUTO: 11.9 FL (ref 8.9–12.9)
PO2 BLD: 252 MMHG (ref 83–108)
PO2 BLDA: 42 MMHG (ref 80–100)
POTASSIUM SERPL-SCNC: 2.7 MMOL/L (ref 3.5–5.1)
POTASSIUM SERPL-SCNC: 4.2 MMOL/L (ref 3.5–5.1)
POTASSIUM SERPL-SCNC: 4.5 MMOL/L (ref 3.5–5.1)
PROCALCITONIN SERPL-MCNC: 2.34 NG/ML
PROT SERPL-MCNC: 4.7 G/DL (ref 6.4–8.2)
PROT SERPL-MCNC: ABNORMAL G/DL (ref 6.4–8.2)
PROT UR STRIP-MCNC: 300 MG/DL
RBC # BLD AUTO: 3.79 M/UL (ref 4.1–5.7)
RBC #/AREA URNS HPF: ABNORMAL /HPF (ref 0–5)
SAO2 % BLD: 99.9 % (ref 92–97)
SAO2% DEVICE SAO2% SENSOR NAME: ABNORMAL
SERVICE CMNT-IMP: ABNORMAL
SODIUM SERPL-SCNC: 141 MMOL/L (ref 136–145)
SODIUM SERPL-SCNC: 143 MMOL/L (ref 136–145)
SODIUM SERPL-SCNC: 146 MMOL/L (ref 136–145)
SP GR UR REFRACTOMETRY: 1.02 (ref 1–1.03)
SPECIMEN SITE: ABNORMAL
SPECIMEN TYPE: ABNORMAL
TROPONIN I SERPL HS-MCNC: 272 NG/L (ref 0–76)
URINE CULTURE IF INDICATED: ABNORMAL
UROBILINOGEN UR QL STRIP.AUTO: 0.2 EU/DL (ref 0.2–1)
VENTILATION MODE VENT: ABNORMAL
VT SETTING VENT: 375 ML
VT SETTING VENT: 400
WBC # BLD AUTO: 6.4 K/UL (ref 4.1–11.1)
WBC URNS QL MICRO: ABNORMAL /HPF (ref 0–4)

## 2025-05-13 PROCEDURE — 6370000000 HC RX 637 (ALT 250 FOR IP): Performed by: INTERNAL MEDICINE

## 2025-05-13 PROCEDURE — 02HV33Z INSERTION OF INFUSION DEVICE INTO SUPERIOR VENA CAVA, PERCUTANEOUS APPROACH: ICD-10-PCS | Performed by: INTERNAL MEDICINE

## 2025-05-13 PROCEDURE — 83605 ASSAY OF LACTIC ACID: CPT

## 2025-05-13 PROCEDURE — 6360000002 HC RX W HCPCS: Performed by: NURSE PRACTITIONER

## 2025-05-13 PROCEDURE — 95816 EEG AWAKE AND DROWSY: CPT

## 2025-05-13 PROCEDURE — 84100 ASSAY OF PHOSPHORUS: CPT

## 2025-05-13 PROCEDURE — 36415 COLL VENOUS BLD VENIPUNCTURE: CPT

## 2025-05-13 PROCEDURE — 2580000003 HC RX 258: Performed by: INTERNAL MEDICINE

## 2025-05-13 PROCEDURE — 94003 VENT MGMT INPAT SUBQ DAY: CPT

## 2025-05-13 PROCEDURE — 81001 URINALYSIS AUTO W/SCOPE: CPT

## 2025-05-13 PROCEDURE — 95717 EEG PHYS/QHP 2-12 HR W/O VID: CPT | Performed by: PSYCHIATRY & NEUROLOGY

## 2025-05-13 PROCEDURE — 85027 COMPLETE CBC AUTOMATED: CPT

## 2025-05-13 PROCEDURE — 6360000004 HC RX CONTRAST MEDICATION: Performed by: INTERNAL MEDICINE

## 2025-05-13 PROCEDURE — 36569 INSJ PICC 5 YR+ W/O IMAGING: CPT

## 2025-05-13 PROCEDURE — 2500000003 HC RX 250 WO HCPCS: Performed by: STUDENT IN AN ORGANIZED HEALTH CARE EDUCATION/TRAINING PROGRAM

## 2025-05-13 PROCEDURE — 85384 FIBRINOGEN ACTIVITY: CPT

## 2025-05-13 PROCEDURE — 95706 EEG WO VID 2-12HR INTMT MNTR: CPT

## 2025-05-13 PROCEDURE — 2500000003 HC RX 250 WO HCPCS: Performed by: INTERNAL MEDICINE

## 2025-05-13 PROCEDURE — 84145 PROCALCITONIN (PCT): CPT

## 2025-05-13 PROCEDURE — 94640 AIRWAY INHALATION TREATMENT: CPT

## 2025-05-13 PROCEDURE — 84484 ASSAY OF TROPONIN QUANT: CPT

## 2025-05-13 PROCEDURE — XX20X89 MONITORING OF BRAIN ELECTRICAL ACTIVITY, COMPUTER-AIDED DETECTION AND NOTIFICATION, NEW TECHNOLOGY GROUP 9: ICD-10-PCS | Performed by: PSYCHIATRY & NEUROLOGY

## 2025-05-13 PROCEDURE — 2580000003 HC RX 258: Performed by: STUDENT IN AN ORGANIZED HEALTH CARE EDUCATION/TRAINING PROGRAM

## 2025-05-13 PROCEDURE — 6370000000 HC RX 637 (ALT 250 FOR IP): Performed by: NURSE PRACTITIONER

## 2025-05-13 PROCEDURE — 83735 ASSAY OF MAGNESIUM: CPT

## 2025-05-13 PROCEDURE — 2000000000 HC ICU R&B

## 2025-05-13 PROCEDURE — 76705 ECHO EXAM OF ABDOMEN: CPT

## 2025-05-13 PROCEDURE — 99232 SBSQ HOSP IP/OBS MODERATE 35: CPT

## 2025-05-13 PROCEDURE — 6360000002 HC RX W HCPCS: Performed by: INTERNAL MEDICINE

## 2025-05-13 PROCEDURE — 70551 MRI BRAIN STEM W/O DYE: CPT

## 2025-05-13 PROCEDURE — 2500000003 HC RX 250 WO HCPCS: Performed by: NURSE PRACTITIONER

## 2025-05-13 PROCEDURE — C8924 2D TTE W OR W/O FOL W/CON,FU: HCPCS

## 2025-05-13 PROCEDURE — 2580000003 HC RX 258: Performed by: NURSE PRACTITIONER

## 2025-05-13 PROCEDURE — 36600 WITHDRAWAL OF ARTERIAL BLOOD: CPT

## 2025-05-13 PROCEDURE — 95957 EEG DIGITAL ANALYSIS: CPT

## 2025-05-13 PROCEDURE — 80069 RENAL FUNCTION PANEL: CPT

## 2025-05-13 PROCEDURE — 6360000002 HC RX W HCPCS: Performed by: STUDENT IN AN ORGANIZED HEALTH CARE EDUCATION/TRAINING PROGRAM

## 2025-05-13 PROCEDURE — 85379 FIBRIN DEGRADATION QUANT: CPT

## 2025-05-13 PROCEDURE — 80076 HEPATIC FUNCTION PANEL: CPT

## 2025-05-13 PROCEDURE — 80053 COMPREHEN METABOLIC PANEL: CPT

## 2025-05-13 PROCEDURE — 82962 GLUCOSE BLOOD TEST: CPT

## 2025-05-13 PROCEDURE — 6370000000 HC RX 637 (ALT 250 FOR IP): Performed by: STUDENT IN AN ORGANIZED HEALTH CARE EDUCATION/TRAINING PROGRAM

## 2025-05-13 PROCEDURE — 99223 1ST HOSP IP/OBS HIGH 75: CPT | Performed by: INTERNAL MEDICINE

## 2025-05-13 PROCEDURE — 99223 1ST HOSP IP/OBS HIGH 75: CPT | Performed by: PSYCHIATRY & NEUROLOGY

## 2025-05-13 PROCEDURE — P9045 ALBUMIN (HUMAN), 5%, 250 ML: HCPCS | Performed by: NURSE PRACTITIONER

## 2025-05-13 RX ORDER — 0.9 % SODIUM CHLORIDE 0.9 %
500 INTRAVENOUS SOLUTION INTRAVENOUS ONCE
Status: COMPLETED | OUTPATIENT
Start: 2025-05-13 | End: 2025-05-13

## 2025-05-13 RX ORDER — CHLORHEXIDINE GLUCONATE ORAL RINSE 1.2 MG/ML
15 SOLUTION DENTAL 2 TIMES DAILY
Status: DISCONTINUED | OUTPATIENT
Start: 2025-05-13 | End: 2025-05-19

## 2025-05-13 RX ORDER — 0.9 % SODIUM CHLORIDE 0.9 %
1000 INTRAVENOUS SOLUTION INTRAVENOUS ONCE
Status: COMPLETED | OUTPATIENT
Start: 2025-05-13 | End: 2025-05-13

## 2025-05-13 RX ORDER — MAGNESIUM SULFATE IN WATER 40 MG/ML
2000 INJECTION, SOLUTION INTRAVENOUS ONCE
Status: COMPLETED | OUTPATIENT
Start: 2025-05-13 | End: 2025-05-13

## 2025-05-13 RX ORDER — NOREPINEPHRINE BITARTRATE 0.06 MG/ML
1-100 INJECTION, SOLUTION INTRAVENOUS CONTINUOUS
Status: DISCONTINUED | OUTPATIENT
Start: 2025-05-13 | End: 2025-05-14

## 2025-05-13 RX ORDER — POTASSIUM CHLORIDE 7.45 MG/ML
10 INJECTION INTRAVENOUS
Status: DISCONTINUED | OUTPATIENT
Start: 2025-05-13 | End: 2025-05-13

## 2025-05-13 RX ORDER — CALCIUM GLUCONATE 94 MG/ML
2000 INJECTION, SOLUTION INTRAVENOUS ONCE
Status: COMPLETED | OUTPATIENT
Start: 2025-05-13 | End: 2025-05-13

## 2025-05-13 RX ORDER — INDOMETHACIN 25 MG/1
100 CAPSULE ORAL ONCE
Status: COMPLETED | OUTPATIENT
Start: 2025-05-13 | End: 2025-05-13

## 2025-05-13 RX ORDER — ALBUMIN HUMAN 50 G/1000ML
25 SOLUTION INTRAVENOUS ONCE
Status: COMPLETED | OUTPATIENT
Start: 2025-05-13 | End: 2025-05-13

## 2025-05-13 RX ORDER — DEXTROSE, SODIUM CHLORIDE, SODIUM LACTATE, POTASSIUM CHLORIDE, AND CALCIUM CHLORIDE 5; .6; .31; .03; .02 G/100ML; G/100ML; G/100ML; G/100ML; G/100ML
INJECTION, SOLUTION INTRAVENOUS CONTINUOUS
Status: DISCONTINUED | OUTPATIENT
Start: 2025-05-13 | End: 2025-05-15

## 2025-05-13 RX ORDER — PROPOFOL 10 MG/ML
5-50 INJECTION, EMULSION INTRAVENOUS CONTINUOUS
Status: DISCONTINUED | OUTPATIENT
Start: 2025-05-13 | End: 2025-05-14

## 2025-05-13 RX ORDER — HYDROCORTISONE SODIUM SUCCINATE 100 MG/2ML
50 INJECTION INTRAMUSCULAR; INTRAVENOUS EVERY 6 HOURS
Status: DISCONTINUED | OUTPATIENT
Start: 2025-05-13 | End: 2025-05-14

## 2025-05-13 RX ORDER — FENTANYL CITRATE 50 UG/ML
50 INJECTION, SOLUTION INTRAMUSCULAR; INTRAVENOUS
Status: DISCONTINUED | OUTPATIENT
Start: 2025-05-13 | End: 2025-05-19 | Stop reason: HOSPADM

## 2025-05-13 RX ADMIN — AMIODARONE HYDROCHLORIDE 150 MG: 1.5 INJECTION, SOLUTION INTRAVENOUS at 01:34

## 2025-05-13 RX ADMIN — LEVETIRACETAM 750 MG: 100 INJECTION INTRAVENOUS at 20:17

## 2025-05-13 RX ADMIN — SODIUM CHLORIDE: 0.9 INJECTION, SOLUTION INTRAVENOUS at 03:06

## 2025-05-13 RX ADMIN — PIPERACILLIN AND TAZOBACTAM 3375 MG: 3; .375 INJECTION, POWDER, LYOPHILIZED, FOR SOLUTION INTRAVENOUS at 18:32

## 2025-05-13 RX ADMIN — Medication: at 08:41

## 2025-05-13 RX ADMIN — SODIUM CHLORIDE, PRESERVATIVE FREE 20 MG: 5 INJECTION INTRAVENOUS at 08:28

## 2025-05-13 RX ADMIN — FOLIC ACID 1 MG: 1 TABLET ORAL at 08:28

## 2025-05-13 RX ADMIN — PIPERACILLIN AND TAZOBACTAM 3375 MG: 3; .375 INJECTION, POWDER, LYOPHILIZED, FOR SOLUTION INTRAVENOUS at 10:54

## 2025-05-13 RX ADMIN — METRONIDAZOLE 500 MG: 500 INJECTION, SOLUTION INTRAVENOUS at 08:20

## 2025-05-13 RX ADMIN — AMIODARONE HYDROCHLORIDE 0.5 MG/MIN: 50 INJECTION, SOLUTION INTRAVENOUS at 08:53

## 2025-05-13 RX ADMIN — DIBASIC SODIUM PHOSPHATE, MONOBASIC POTASSIUM PHOSPHATE AND MONOBASIC SODIUM PHOSPHATE 2 TABLET: 852; 155; 130 TABLET ORAL at 05:17

## 2025-05-13 RX ADMIN — SULFUR HEXAFLUORIDE 2 ML: KIT at 13:48

## 2025-05-13 RX ADMIN — IPRATROPIUM BROMIDE AND ALBUTEROL SULFATE 1 DOSE: .5; 3 SOLUTION RESPIRATORY (INHALATION) at 00:20

## 2025-05-13 RX ADMIN — POTASSIUM CHLORIDE 10 MEQ: 7.46 INJECTION, SOLUTION INTRAVENOUS at 06:42

## 2025-05-13 RX ADMIN — VANCOMYCIN HYDROCHLORIDE 1750 MG: 10 INJECTION, POWDER, LYOPHILIZED, FOR SOLUTION INTRAVENOUS at 11:39

## 2025-05-13 RX ADMIN — SODIUM BICARBONATE 100 MEQ: 84 INJECTION, SOLUTION INTRAVENOUS at 05:55

## 2025-05-13 RX ADMIN — SODIUM BICARBONATE: 84 INJECTION, SOLUTION INTRAVENOUS at 06:39

## 2025-05-13 RX ADMIN — POTASSIUM BICARBONATE 40 MEQ: 782 TABLET, EFFERVESCENT ORAL at 05:17

## 2025-05-13 RX ADMIN — POTASSIUM CHLORIDE 10 MEQ: 7.46 INJECTION, SOLUTION INTRAVENOUS at 05:28

## 2025-05-13 RX ADMIN — VASOPRESSIN 0.04 UNITS/MIN: 20 INJECTION INTRAVENOUS at 12:40

## 2025-05-13 RX ADMIN — Medication: at 19:44

## 2025-05-13 RX ADMIN — HYDROCORTISONE SODIUM SUCCINATE 50 MG: 100 INJECTION, POWDER, FOR SOLUTION INTRAMUSCULAR; INTRAVENOUS at 21:51

## 2025-05-13 RX ADMIN — SODIUM CHLORIDE, PRESERVATIVE FREE 10 ML: 5 INJECTION INTRAVENOUS at 08:20

## 2025-05-13 RX ADMIN — LEVETIRACETAM 750 MG: 100 INJECTION INTRAVENOUS at 08:30

## 2025-05-13 RX ADMIN — HYDROCORTISONE SODIUM SUCCINATE 50 MG: 100 INJECTION, POWDER, FOR SOLUTION INTRAMUSCULAR; INTRAVENOUS at 12:47

## 2025-05-13 RX ADMIN — ALBUMIN (HUMAN) 25 G: 12.5 INJECTION, SOLUTION INTRAVENOUS at 06:02

## 2025-05-13 RX ADMIN — MAGNESIUM SULFATE HEPTAHYDRATE 2000 MG: 40 INJECTION, SOLUTION INTRAVENOUS at 05:25

## 2025-05-13 RX ADMIN — CHLORHEXIDINE GLUCONATE 15 ML: 1.2 RINSE ORAL at 19:49

## 2025-05-13 RX ADMIN — THERA TABS 1 TABLET: TAB at 08:28

## 2025-05-13 RX ADMIN — CIPROFLOXACIN 400 MG: 2 INJECTION, SOLUTION INTRAVENOUS at 03:08

## 2025-05-13 RX ADMIN — ASPIRIN 81 MG: 81 TABLET, CHEWABLE ORAL at 08:28

## 2025-05-13 RX ADMIN — Medication 1 AMPULE: at 08:20

## 2025-05-13 RX ADMIN — DEXTROSE, SODIUM CHLORIDE, SODIUM LACTATE, POTASSIUM CHLORIDE, AND CALCIUM CHLORIDE: 5; .6; .31; .03; .02 INJECTION, SOLUTION INTRAVENOUS at 18:24

## 2025-05-13 RX ADMIN — SODIUM CHLORIDE: 0.9 INJECTION, SOLUTION INTRAVENOUS at 05:24

## 2025-05-13 RX ADMIN — CHLORHEXIDINE GLUCONATE 15 ML: 1.2 RINSE ORAL at 08:30

## 2025-05-13 RX ADMIN — CALCIUM GLUCONATE 2000 MG: 98 INJECTION, SOLUTION INTRAVENOUS at 05:15

## 2025-05-13 RX ADMIN — ACETAMINOPHEN 650 MG: 650 SUPPOSITORY RECTAL at 08:32

## 2025-05-13 RX ADMIN — SODIUM CHLORIDE 500 ML: 0.9 INJECTION, SOLUTION INTRAVENOUS at 09:35

## 2025-05-13 RX ADMIN — Medication 100 MG: at 08:28

## 2025-05-13 RX ADMIN — SODIUM CHLORIDE, PRESERVATIVE FREE 10 ML: 5 INJECTION INTRAVENOUS at 19:47

## 2025-05-13 RX ADMIN — Medication 1 AMPULE: at 19:45

## 2025-05-13 RX ADMIN — SODIUM CHLORIDE 1000 ML: 0.9 INJECTION, SOLUTION INTRAVENOUS at 02:09

## 2025-05-13 RX ADMIN — HYDROCORTISONE SODIUM SUCCINATE 50 MG: 100 INJECTION, POWDER, FOR SOLUTION INTRAMUSCULAR; INTRAVENOUS at 18:18

## 2025-05-13 RX ADMIN — NOREPINEPHRINE BITARTRATE 19 MCG/MIN: 64 SOLUTION INTRAVENOUS at 08:21

## 2025-05-13 ASSESSMENT — PULMONARY FUNCTION TESTS
PIF_VALUE: 19
PIF_VALUE: 21
PIF_VALUE: 22
PIF_VALUE: 21
PIF_VALUE: 20
PIF_VALUE: 20

## 2025-05-13 ASSESSMENT — PAIN SCALES - GENERAL
PAINLEVEL_OUTOF10: 0
PAINLEVEL_OUTOF10: 0

## 2025-05-13 NOTE — SIGNIFICANT EVENT
2033- Rounded on patient due to elevated DI score.  Patient's daughter was at the bedside and was very concerned about her father's alerted mental status.  He has been unable to talk for several days, and two weekends ago he \"push-mowed\" his grass.  Patient assessed.  He has rapid, shallow breathing and is minimally responsive. Asked nurse practitioner to examine the patient.      2040- Per primary nurse, the patient has only voided 200 cc today.  Bladder scan reveals 646 cc.    2050- NP at bedside.  Received orders for an ABG, EEG, florez catheter, and CT of the head. Respiratory paged to draw ABG.    2105- ABG drawn and results given to DAVID Gallo. Florez catheter placed.    2120- Rapid EEG placed on the patient. Seizure burden rapidly increased to 60%.  DAVID Gallo notified.  Orders received for 2 mg of ativan.    2124- 2 mg of ativan given. Seizure burden rapidly decreased to 0.  DAVID Gallo notified.  Orders received to administer Keppra and Keppra was given. Shortly thereafter,  patient's overall respiratory status declined and a rapid response was called overhead.  The intensivist was consulted.    2141- Labs drawn and sent.    2150- Decision made to intubate.  All medications given as a verbal order from DAVID Hernandez.    2200- 200 mcg of Phenylephrine given IVP.    2202- Levophed gtt started at 5 mcg/min.    2204- Levophed gtt increased to 10 mcg/min.  400 mcg of Phenylephrine given IVP.    2208- Anesthesia arrived to assist with intubation.    2209- 20 mg of Etomidate and 100 mg of rocuronium given IVP.    2311- Patient intubated at 23 at the teeth.  Positive color change.  Chest x-ray ordered.    2312- Levo increased to 30 mcg/min.    2213- 300 mcg of phenylephrine given.     2214- 700 mcg of phenylephrine given.    2216- OGT placed.    2220- Patient transported to CCU with nurse, respiratory, and monitor.

## 2025-05-13 NOTE — PROCEDURES
PROCEDURE NOTE  Date: 5/13/2025   Name: Aubrey Bell III  YOB: 1935    Procedures        This Routine EEG was performed in real-time by an EEG technologist.  Electrodes placed according to the 10-20 International System.  Standard sensitivity 7uV/mm and high filter 70 Hz and low filter 1 Hz settings were set at initiation of the recording and adjustments, as appropriate, and noted on the recording.  Baseline electrode impedances were at or below 10k Ohms.  Per protocol, this recording data is uploaded/transferred from the EEG equipment to a central storage location in real time.  Duration of this EEG was 24:08 minutes.  Photic stimulation was not performed, and Hyperventilation was not performed.  Digital analysis is recorded using a software tool that analyzes EEG data to identify seizures called Persyst.
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
## 2025-05-13 NOTE — PROCEDURES
PROCEDURE NOTE  Date: 5/13/2025   Name: Aubrey Bell III  YOB: 1935    Procedures        Unable to perform EEG at 1:16pm, 2:15pm and 3:20pm. Patient is still waiting for MRI per RN.

## 2025-05-13 NOTE — PROCEDURES
PROCEDURE NOTE  Date: 5/12/2025   Name: Aubrey Bell III  YOB: 1935         Procedure Note - Intubation:   Performed by SHABBIR Majano NP .     Diagnosis: Acute heart hypoxic respiratory failure/seizure  Insertion Date: 5/12/2025 Time:2215   Obtained Consent? yes; emergent   Procedure Location:  2143.      Immediately prior to the procedure, the patient was reevaluated and found suitable for the planned procedure and any planned medications.  Immediately prior to the procedure a time out was called to verify the correct patient, procedure, equipment, staff, and marking as appropriate.    Preoxygenation applied via BVM  Medications given were etomidate and rocuronium (Zemuron).   A number 7.5 cuffed   ETT was placed to 23 cm at the teeth.   Placement was evaluated by noting bilateral, symmetric breath sounds, good end-tidal CO2 detector color change , and chest x-ray visualization.    Attempts required: 1.    Complications: none.    RSI was used..  The procedure was tolerated well.  A follow-up chest x-ray was ordered post procedure.      SHABBIR Majano NP  Critical Care Medicine  [facility]

## 2025-05-13 NOTE — INTERDISCIPLINARY ROUNDS
Interdisciplinary team rounds were held 5/13/2025 with the following team members: Physician, Nursing, Dietitian, Pharmacist, , and the CCU Charge RN.    Plan of care discussed. See clinical pathway and/or care plan for interventions and desired outcomes.    Goals of the Day: VAT consulted to place a PICC line for vasopressor use, MAP goal > 65, and EEG and MRI.

## 2025-05-13 NOTE — PROCEDURES
EEG REPORT    Patient Name: Aubrey Bell III  : 1935  Age: 89 y.o.    Ordering physician: Dr. Marley  Date of EE2025  21:14 - 22:07,  23:47 - 2025  01:43  Diagnosis: seizure  Interpreting physician: Miguel A Rueda D.O. FAAN    Procedure: EEG    CLINICAL INDICATION: The patient is a 89 y.o. male who is being evaluated for baseline electro cerebral activities and to rule out seizure focus.      Current Facility-Administered Medications   Medication Dose Route Frequency    propofol infusion  5-50 mcg/kg/min IntraVENous Continuous    potassium chloride 10 mEq/100 mL IVPB (Peripheral Line)  10 mEq IntraVENous Q1H    magnesium sulfate 2000 mg in 50 mL IVPB premix  2,000 mg IntraVENous Once    albumin human 5% IV solution 25 g  25 g IntraVENous Once    sodium bicarbonate 150 mEq in dextrose 5 % 1,000 mL infusion   IntraVENous Continuous    0.9 % sodium chloride infusion   IntraVENous Continuous    LORazepam (ATIVAN) injection 2 mg  2 mg IntraVENous Once    norepinephrine (LEVOPHED) 16 mg in sodium chloride 0.9 % 250 mL infusion (premix)  0.5-20 mcg/min IntraVENous Continuous    ipratropium 0.5 mg-albuterol 2.5 mg (DUONEB) nebulizer solution 1 Dose  1 Dose Inhalation Q6H PRN    levETIRAcetam (KEPPRA) injection 750 mg  750 mg IntraVENous Q12H    amiodarone (CORDARONE) 450 mg in dextrose 5 % 250 mL infusion ([id number])  0.5 mg/min IntraVENous Continuous    atorvastatin (LIPITOR) tablet 80 mg  80 mg Oral Nightly    aspirin chewable tablet 81 mg  81 mg Oral Daily    [Held by provider] amLODIPine (NORVASC) tablet 5 mg  5 mg Oral Daily    [Held by provider] famotidine (PEPCID) tablet 20 mg  20 mg Oral Daily    sodium chloride flush 0.9 % injection 5-40 mL  5-40 mL IntraVENous 2 times per day    sodium chloride flush 0.9 % injection 5-40 mL  5-40 mL IntraVENous PRN    0.9 % sodium chloride infusion   IntraVENous PRN    thiamine mononitrate tablet 100 mg  100 mg Oral Daily    multivitamin 1 tablet  1 tablet

## 2025-05-14 PROBLEM — E88.09 HYPOALBUMINEMIA: Status: ACTIVE | Noted: 2025-05-14

## 2025-05-14 PROBLEM — I48.91 ATRIAL FIBRILLATION (HCC): Status: ACTIVE | Noted: 2025-05-14

## 2025-05-14 PROBLEM — R53.1 GENERALIZED WEAKNESS: Status: ACTIVE | Noted: 2025-05-14

## 2025-05-14 PROBLEM — R93.3: Status: ACTIVE | Noted: 2025-05-14

## 2025-05-14 PROBLEM — R65.20 SEVERE SEPSIS (HCC): Status: ACTIVE | Noted: 2025-05-14

## 2025-05-14 PROBLEM — R53.81 PHYSICAL DEBILITY: Status: ACTIVE | Noted: 2025-05-14

## 2025-05-14 PROBLEM — J96.01 ACUTE HYPOXIC RESPIRATORY FAILURE (HCC): Status: ACTIVE | Noted: 2025-05-14

## 2025-05-14 PROBLEM — G40.909 SEIZURE DISORDER (HCC): Status: ACTIVE | Noted: 2025-05-13

## 2025-05-14 PROBLEM — D69.6 THROMBOCYTOPENIA: Status: ACTIVE | Noted: 2025-05-14

## 2025-05-14 PROBLEM — E44.0 MODERATE PROTEIN-CALORIE MALNUTRITION: Status: ACTIVE | Noted: 2025-05-14

## 2025-05-14 PROBLEM — E87.6 HYPOKALEMIA: Status: ACTIVE | Noted: 2025-05-14

## 2025-05-14 PROBLEM — Z71.89 GOALS OF CARE, COUNSELING/DISCUSSION: Status: ACTIVE | Noted: 2025-05-14

## 2025-05-14 PROBLEM — R19.7 DIARRHEA OF PRESUMED INFECTIOUS ORIGIN: Status: ACTIVE | Noted: 2025-05-14

## 2025-05-14 PROBLEM — K62.89 PROCTITIS: Status: ACTIVE | Noted: 2025-05-14

## 2025-05-14 PROBLEM — A41.9 SEVERE SEPSIS (HCC): Status: ACTIVE | Noted: 2025-05-14

## 2025-05-14 PROBLEM — I48.11 LONGSTANDING PERSISTENT ATRIAL FIBRILLATION (HCC): Status: ACTIVE | Noted: 2025-05-14

## 2025-05-14 PROBLEM — Z71.89 ADVANCE CARE PLANNING: Status: ACTIVE | Noted: 2025-05-14

## 2025-05-14 LAB
ALBUMIN SERPL-MCNC: 2.1 G/DL (ref 3.5–5)
ALBUMIN/GLOB SERPL: 0.9 (ref 1.1–2.2)
ALP SERPL-CCNC: 50 U/L (ref 45–117)
ALT SERPL-CCNC: 46 U/L (ref 12–78)
ANION GAP SERPL CALC-SCNC: 6 MMOL/L (ref 2–12)
AST SERPL-CCNC: 339 U/L (ref 15–37)
BILIRUB DIRECT SERPL-MCNC: 0.2 MG/DL (ref 0–0.2)
BILIRUB SERPL-MCNC: 0.7 MG/DL (ref 0.2–1)
BUN SERPL-MCNC: 48 MG/DL (ref 6–20)
BUN/CREAT SERPL: 22 (ref 12–20)
CALCIUM SERPL-MCNC: 7.4 MG/DL (ref 8.5–10.1)
CHLORIDE SERPL-SCNC: 117 MMOL/L (ref 97–108)
CHOLEST SERPL-MCNC: <50 MG/DL
CO2 SERPL-SCNC: 22 MMOL/L (ref 21–32)
CREAT SERPL-MCNC: 2.16 MG/DL (ref 0.7–1.3)
ERYTHROCYTE [DISTWIDTH] IN BLOOD BY AUTOMATED COUNT: 16 % (ref 11.5–14.5)
EST. AVERAGE GLUCOSE BLD GHB EST-MCNC: 120 MG/DL
GLOBULIN SER CALC-MCNC: 2.3 G/DL (ref 2–4)
GLUCOSE BLD STRIP.AUTO-MCNC: 195 MG/DL (ref 65–117)
GLUCOSE BLD STRIP.AUTO-MCNC: 237 MG/DL (ref 65–117)
GLUCOSE SERPL-MCNC: 202 MG/DL (ref 65–100)
HBA1C MFR BLD: 5.8 % (ref 4–5.6)
HCT VFR BLD AUTO: 34 % (ref 36.6–50.3)
HDLC SERPL-MCNC: 11 MG/DL
HDLC SERPL: ABNORMAL (ref 0–5)
HGB BLD-MCNC: 11.7 G/DL (ref 12.1–17)
LDLC SERPL CALC-MCNC: ABNORMAL MG/DL (ref 0–100)
MAGNESIUM SERPL-MCNC: 2.4 MG/DL (ref 1.6–2.4)
MCH RBC QN AUTO: 31.6 PG (ref 26–34)
MCHC RBC AUTO-ENTMCNC: 34.4 G/DL (ref 30–36.5)
MCV RBC AUTO: 91.9 FL (ref 80–99)
NRBC # BLD: 0 K/UL (ref 0–0.01)
NRBC BLD-RTO: 0 PER 100 WBC
PHOSPHATE SERPL-MCNC: 2.5 MG/DL (ref 2.6–4.7)
PLATELET # BLD AUTO: 61 K/UL (ref 150–400)
PMV BLD AUTO: 12.1 FL (ref 8.9–12.9)
POTASSIUM SERPL-SCNC: 3.8 MMOL/L (ref 3.5–5.1)
PROT SERPL-MCNC: 4.4 G/DL (ref 6.4–8.2)
RBC # BLD AUTO: 3.7 M/UL (ref 4.1–5.7)
SERVICE CMNT-IMP: ABNORMAL
SERVICE CMNT-IMP: ABNORMAL
SODIUM SERPL-SCNC: 145 MMOL/L (ref 136–145)
TRIGL SERPL-MCNC: 235 MG/DL
VLDLC SERPL CALC-MCNC: ABNORMAL MG/DL
WBC # BLD AUTO: 3 K/UL (ref 4.1–11.1)

## 2025-05-14 PROCEDURE — 2580000003 HC RX 258: Performed by: INTERNAL MEDICINE

## 2025-05-14 PROCEDURE — 6360000002 HC RX W HCPCS: Performed by: INTERNAL MEDICINE

## 2025-05-14 PROCEDURE — 80061 LIPID PANEL: CPT

## 2025-05-14 PROCEDURE — 83036 HEMOGLOBIN GLYCOSYLATED A1C: CPT

## 2025-05-14 PROCEDURE — 6360000002 HC RX W HCPCS: Performed by: STUDENT IN AN ORGANIZED HEALTH CARE EDUCATION/TRAINING PROGRAM

## 2025-05-14 PROCEDURE — 99232 SBSQ HOSP IP/OBS MODERATE 35: CPT

## 2025-05-14 PROCEDURE — 80048 BASIC METABOLIC PNL TOTAL CA: CPT

## 2025-05-14 PROCEDURE — 82962 GLUCOSE BLOOD TEST: CPT

## 2025-05-14 PROCEDURE — 80076 HEPATIC FUNCTION PANEL: CPT

## 2025-05-14 PROCEDURE — 95816 EEG AWAKE AND DROWSY: CPT

## 2025-05-14 PROCEDURE — 6370000000 HC RX 637 (ALT 250 FOR IP): Performed by: INTERNAL MEDICINE

## 2025-05-14 PROCEDURE — 84100 ASSAY OF PHOSPHORUS: CPT

## 2025-05-14 PROCEDURE — 6360000002 HC RX W HCPCS: Performed by: NURSE PRACTITIONER

## 2025-05-14 PROCEDURE — 99232 SBSQ HOSP IP/OBS MODERATE 35: CPT | Performed by: PSYCHIATRY & NEUROLOGY

## 2025-05-14 PROCEDURE — 2580000003 HC RX 258: Performed by: NURSE PRACTITIONER

## 2025-05-14 PROCEDURE — 85027 COMPLETE CBC AUTOMATED: CPT

## 2025-05-14 PROCEDURE — 0DH67UZ INSERTION OF FEEDING DEVICE INTO STOMACH, VIA NATURAL OR ARTIFICIAL OPENING: ICD-10-PCS | Performed by: INTERNAL MEDICINE

## 2025-05-14 PROCEDURE — 3E0G76Z INTRODUCTION OF NUTRITIONAL SUBSTANCE INTO UPPER GI, VIA NATURAL OR ARTIFICIAL OPENING: ICD-10-PCS | Performed by: INTERNAL MEDICINE

## 2025-05-14 PROCEDURE — 83735 ASSAY OF MAGNESIUM: CPT

## 2025-05-14 PROCEDURE — 95816 EEG AWAKE AND DROWSY: CPT | Performed by: PSYCHIATRY & NEUROLOGY

## 2025-05-14 PROCEDURE — 99233 SBSQ HOSP IP/OBS HIGH 50: CPT | Performed by: INTERNAL MEDICINE

## 2025-05-14 PROCEDURE — 2500000003 HC RX 250 WO HCPCS: Performed by: STUDENT IN AN ORGANIZED HEALTH CARE EDUCATION/TRAINING PROGRAM

## 2025-05-14 PROCEDURE — 36415 COLL VENOUS BLD VENIPUNCTURE: CPT

## 2025-05-14 PROCEDURE — 94003 VENT MGMT INPAT SUBQ DAY: CPT

## 2025-05-14 PROCEDURE — 6370000000 HC RX 637 (ALT 250 FOR IP): Performed by: STUDENT IN AN ORGANIZED HEALTH CARE EDUCATION/TRAINING PROGRAM

## 2025-05-14 PROCEDURE — 2000000000 HC ICU R&B

## 2025-05-14 RX ORDER — SODIUM CHLORIDE, SODIUM LACTATE, POTASSIUM CHLORIDE, AND CALCIUM CHLORIDE .6; .31; .03; .02 G/100ML; G/100ML; G/100ML; G/100ML
1000 INJECTION, SOLUTION INTRAVENOUS ONCE
Status: COMPLETED | OUTPATIENT
Start: 2025-05-14 | End: 2025-05-14

## 2025-05-14 RX ORDER — THIAMINE HYDROCHLORIDE 100 MG/ML
200 INJECTION, SOLUTION INTRAMUSCULAR; INTRAVENOUS 3 TIMES DAILY
Status: COMPLETED | OUTPATIENT
Start: 2025-05-14 | End: 2025-05-16

## 2025-05-14 RX ADMIN — THIAMINE HYDROCHLORIDE 200 MG: 100 INJECTION, SOLUTION INTRAMUSCULAR; INTRAVENOUS at 12:35

## 2025-05-14 RX ADMIN — PIPERACILLIN AND TAZOBACTAM 3375 MG: 3; .375 INJECTION, POWDER, LYOPHILIZED, FOR SOLUTION INTRAVENOUS at 10:28

## 2025-05-14 RX ADMIN — Medication 1 AMPULE: at 09:54

## 2025-05-14 RX ADMIN — SODIUM CHLORIDE, SODIUM LACTATE, POTASSIUM CHLORIDE, AND CALCIUM CHLORIDE 1000 ML: .6; .31; .03; .02 INJECTION, SOLUTION INTRAVENOUS at 13:28

## 2025-05-14 RX ADMIN — ACETAMINOPHEN 650 MG: 325 TABLET ORAL at 12:35

## 2025-05-14 RX ADMIN — SODIUM CHLORIDE, PRESERVATIVE FREE 10 ML: 5 INJECTION INTRAVENOUS at 19:34

## 2025-05-14 RX ADMIN — CHLORHEXIDINE GLUCONATE 15 ML: 1.2 RINSE ORAL at 19:33

## 2025-05-14 RX ADMIN — FOLIC ACID 1 MG: 1 TABLET ORAL at 09:42

## 2025-05-14 RX ADMIN — Medication 1 AMPULE: at 19:33

## 2025-05-14 RX ADMIN — AMIODARONE HYDROCHLORIDE 0.5 MG/MIN: 50 INJECTION, SOLUTION INTRAVENOUS at 01:43

## 2025-05-14 RX ADMIN — AMIODARONE HYDROCHLORIDE 0.5 MG/MIN: 50 INJECTION, SOLUTION INTRAVENOUS at 18:55

## 2025-05-14 RX ADMIN — Medication 100 MG: at 09:42

## 2025-05-14 RX ADMIN — ASPIRIN 81 MG: 81 TABLET, CHEWABLE ORAL at 09:42

## 2025-05-14 RX ADMIN — DEXTROSE, SODIUM CHLORIDE, SODIUM LACTATE, POTASSIUM CHLORIDE, AND CALCIUM CHLORIDE: 5; .6; .31; .03; .02 INJECTION, SOLUTION INTRAVENOUS at 07:18

## 2025-05-14 RX ADMIN — THERA TABS 1 TABLET: TAB at 09:42

## 2025-05-14 RX ADMIN — Medication: at 19:33

## 2025-05-14 RX ADMIN — LEVETIRACETAM 750 MG: 100 INJECTION INTRAVENOUS at 20:47

## 2025-05-14 RX ADMIN — HYDROCORTISONE SODIUM SUCCINATE 50 MG: 100 INJECTION, POWDER, FOR SOLUTION INTRAMUSCULAR; INTRAVENOUS at 04:17

## 2025-05-14 RX ADMIN — PIPERACILLIN AND TAZOBACTAM 3375 MG: 3; .375 INJECTION, POWDER, LYOPHILIZED, FOR SOLUTION INTRAVENOUS at 01:47

## 2025-05-14 RX ADMIN — Medication: at 09:54

## 2025-05-14 RX ADMIN — CHLORHEXIDINE GLUCONATE 15 ML: 1.2 RINSE ORAL at 09:55

## 2025-05-14 RX ADMIN — DEXTROSE, SODIUM CHLORIDE, SODIUM LACTATE, POTASSIUM CHLORIDE, AND CALCIUM CHLORIDE: 5; .6; .31; .03; .02 INJECTION, SOLUTION INTRAVENOUS at 18:54

## 2025-05-14 RX ADMIN — SODIUM CHLORIDE, PRESERVATIVE FREE 20 MG: 5 INJECTION INTRAVENOUS at 09:42

## 2025-05-14 RX ADMIN — THIAMINE HYDROCHLORIDE 200 MG: 100 INJECTION, SOLUTION INTRAMUSCULAR; INTRAVENOUS at 16:41

## 2025-05-14 RX ADMIN — PROPOFOL 10 MCG/KG/MIN: 10 INJECTION, EMULSION INTRAVENOUS at 04:18

## 2025-05-14 RX ADMIN — LEVETIRACETAM 750 MG: 100 INJECTION INTRAVENOUS at 09:43

## 2025-05-14 RX ADMIN — THIAMINE HYDROCHLORIDE 200 MG: 100 INJECTION, SOLUTION INTRAMUSCULAR; INTRAVENOUS at 20:47

## 2025-05-14 RX ADMIN — PIPERACILLIN AND TAZOBACTAM 3375 MG: 3; .375 INJECTION, POWDER, LYOPHILIZED, FOR SOLUTION INTRAVENOUS at 18:47

## 2025-05-14 RX ADMIN — SODIUM CHLORIDE, PRESERVATIVE FREE 10 ML: 5 INJECTION INTRAVENOUS at 09:43

## 2025-05-14 ASSESSMENT — PULMONARY FUNCTION TESTS
PIF_VALUE: 19
PIF_VALUE: 10
PIF_VALUE: 19
PIF_VALUE: 20
PIF_VALUE: 14
PIF_VALUE: 19

## 2025-05-14 NOTE — CARE COORDINATION
Care Management Initial Assessment       RUR:15%  Readmission? No  1st IM letter given? Yes - Given by patient access on 5/12/25  1st  letter given: No--N/A         05/14/25 1115   Service Assessment   Patient Orientation Other (see comment)  (Patient is intubated.)   Cognition Other (see comment)  (Patient is intubated.)   History Provided By Child/Family   Primary Caregiver Self   Support Systems Spouse/Significant Other;Children   Patient's Healthcare Decision Maker is: Legal Next of Kin   PCP Verified by CM Yes   Last Visit to PCP Within last 3 months   Prior Functional Level Independent in ADLs/IADLs   Current Functional Level Assistance with the following:;Bathing;Dressing;Toileting;Feeding;Mobility   Can patient return to prior living arrangement Other (see comment)  (Family wants to see how the patient progresses.)   Family able to assist with home care needs: Other (comment)  (Family wants to see how the patient progresses.)   Would you like for me to discuss the discharge plan with any other family members/significant others, and if so, who?   (Patient has a wife)   Financial Resources Medicare   Community Resources None   Social/Functional History   Lives With Spouse   Type of Home House  (Two story)   Home Equipment None   Active  Yes   Discharge Planning   Type of Residence Other (Comment)  (Family wants to see how the patient progresses.)   Current Services Prior To Admission None   Patient expects to be discharged to: Other (comment)  (Family wants to see how the patient progresses.)         Patient transferred from [facility] to ccu for higher level of care.  Daughter in the room with the patient and her mother is on the way. Daughter confirmed demographics and pcp information.  Patient intubated at this time.        Advance Care Planning     General Advance Care Planning (ACP) Conversation    Date of Conversation: 5/14/2025  Conducted with: Patient with Decision Making Capacity and Legal

## 2025-05-14 NOTE — PROCEDURES
EEG REPORT    Patient Name: Aubrey Bell III  : 1935  Age: 89 y.o.    Ordering physician: Dr. Coyle  Date of EE2025  13:50 - 14:12  Diagnosis: seizures  Interpreting physician: Miguel A Rueda D.O. FAAN    Procedure: EEG    CLINICAL INDICATION: The patient is a 89 y.o. male who is being evaluated for baseline electro cerebral activities and to rule out seizure focus.      Current Facility-Administered Medications   Medication Dose Route Frequency    thiamine (B-1) injection 200 mg  200 mg IntraVENous TID    lactated ringers bolus 1,000 mL  1,000 mL IntraVENous Once    alcohol 62% (NOZIN) nasal  1 ampule  1 ampule Nasal Q12H    chlorhexidine (PERIDEX) 0.12 % solution 15 mL  15 mL Mouth/Throat BID    piperacillin-tazobactam (ZOSYN) 3,375 mg in sodium chloride 0.9 % 50 mL IVPB (addEASE)  3,375 mg IntraVENous Q8H    fentaNYL (SUBLIMAZE) injection 50 mcg  50 mcg IntraVENous Q1H PRN    dextrose 5 % in lactated ringers infusion   IntraVENous Continuous    ipratropium 0.5 mg-albuterol 2.5 mg (DUONEB) nebulizer solution 1 Dose  1 Dose Inhalation Q6H PRN    levETIRAcetam (KEPPRA) injection 750 mg  750 mg IntraVENous Q12H    amiodarone (CORDARONE) 450 mg in dextrose 5 % 250 mL infusion ([id number])  0.5 mg/min IntraVENous Continuous    [Held by provider] atorvastatin (LIPITOR) tablet 80 mg  80 mg Oral Nightly    aspirin chewable tablet 81 mg  81 mg Oral Daily    [Held by provider] amLODIPine (NORVASC) tablet 5 mg  5 mg Oral Daily    sodium chloride flush 0.9 % injection 5-40 mL  5-40 mL IntraVENous 2 times per day    sodium chloride flush 0.9 % injection 5-40 mL  5-40 mL IntraVENous PRN    0.9 % sodium chloride infusion   IntraVENous PRN    [Held by provider] thiamine mononitrate tablet 100 mg  100 mg Oral Daily    multivitamin 1 tablet  1 tablet Oral Daily    folic acid (FOLVITE) tablet 1 mg  1 mg Oral Daily    balsum peru-castor oil (VENELEX) ointment   Topical BID    famotidine (PEPCID) 20 MG/2ML 20

## 2025-05-14 NOTE — PROCEDURES
EEG REPORT    Patient Name: Aubrey Bell III  : 1935  Age: 89 y.o.    Ordering physician: Dr. Marley  Date of EE2025  16:48 - 17:12  Diagnosis: seizure  Interpreting physician: Miguel A Rueda D.O. FAAN    Procedure: EEG    CLINICAL INDICATION: The patient is a 89 y.o. male who is being evaluated for baseline electro cerebral activities and to rule out seizure focus.      Current Facility-Administered Medications   Medication Dose Route Frequency    propofol infusion  5-50 mcg/kg/min IntraVENous Continuous    alcohol 62% (NOZIN) nasal  1 ampule  1 ampule Nasal Q12H    chlorhexidine (PERIDEX) 0.12 % solution 15 mL  15 mL Mouth/Throat BID    norepinephrine (LEVOPHED) 16 mg in sodium chloride 0.9 % 250 mL infusion (premix)  1-100 mcg/min IntraVENous Continuous    piperacillin-tazobactam (ZOSYN) 3,375 mg in sodium chloride 0.9 % 50 mL IVPB (addEASE)  3,375 mg IntraVENous Q8H    hydrocortisone sodium succinate PF (SOLU-CORTEF) injection 50 mg  50 mg IntraVENous Q6H    fentaNYL (SUBLIMAZE) injection 50 mcg  50 mcg IntraVENous Q1H PRN    Vancomycin Level  1200 - Please make sure lab is drawn   Other Once    vancomycin (VANCOCIN) intermittent dosing (placeholder)   Other RX Placeholder    dextrose 5 % in lactated ringers infusion   IntraVENous Continuous    ipratropium 0.5 mg-albuterol 2.5 mg (DUONEB) nebulizer solution 1 Dose  1 Dose Inhalation Q6H PRN    levETIRAcetam (KEPPRA) injection 750 mg  750 mg IntraVENous Q12H    amiodarone (CORDARONE) 450 mg in dextrose 5 % 250 mL infusion ([id number])  0.5 mg/min IntraVENous Continuous    [Held by provider] atorvastatin (LIPITOR) tablet 80 mg  80 mg Oral Nightly    aspirin chewable tablet 81 mg  81 mg Oral Daily    [Held by provider] amLODIPine (NORVASC) tablet 5 mg  5 mg Oral Daily    sodium chloride flush 0.9 % injection 5-40 mL  5-40 mL IntraVENous 2 times per day    sodium chloride flush 0.9 % injection 5-40 mL  5-40 mL IntraVENous PRN    0.9 %

## 2025-05-14 NOTE — PROCEDURES
PROCEDURE NOTE  Date: 5/14/2025   Name: Aubrey Bell III  YOB: 1935          This Routine EEG was performed in real-time by an EEG technologist.  Electrodes placed according to the 10-20 International System.  Standard sensitivity 7uV/mm and high filter 70 Hz and low filter 1 Hz settings were set at initiation of the recording and adjustments, as appropriate, and noted on the recording.  Baseline electrode impedances were at or below 10k Ohms.  Per protocol, this recording data is uploaded/transferred from the EEG equipment to a central storage location in real time.  Duration of this EEG was (21:00) minutes.  Photic stimulation was performed, and Hyperventilation was not performed.

## 2025-05-14 NOTE — INTERDISCIPLINARY ROUNDS
Critical care interdisciplinary rounds today.  Following members present: Case Management, Clinical Lead,Nursing, Nutrition, Pharmacy, and Physician. Family invited to participate.  Plan of care discussed.  See clinical pathway for plan of care and interventions and desired outcomes.    PICC in place for access.     Sharpe in place for strict I/O's.     Restraints in place and in use.

## 2025-05-15 PROBLEM — G93.41 METABOLIC ENCEPHALOPATHY: Status: ACTIVE | Noted: 2025-05-15

## 2025-05-15 LAB
ALBUMIN SERPL-MCNC: 1.7 G/DL (ref 3.5–5)
ALBUMIN/GLOB SERPL: 0.7 (ref 1.1–2.2)
ALP SERPL-CCNC: 66 U/L (ref 45–117)
ALT SERPL-CCNC: 82 U/L (ref 12–78)
AMMONIA PLAS-SCNC: 34 UMOL/L
ANION GAP SERPL CALC-SCNC: 8 MMOL/L (ref 2–12)
AST SERPL-CCNC: 558 U/L (ref 15–37)
BILIRUB DIRECT SERPL-MCNC: 0.3 MG/DL (ref 0–0.2)
BILIRUB SERPL-MCNC: 0.4 MG/DL (ref 0.2–1)
BUN SERPL-MCNC: 64 MG/DL (ref 6–20)
BUN/CREAT SERPL: 24 (ref 12–20)
CALCIUM SERPL-MCNC: 7.1 MG/DL (ref 8.5–10.1)
CHLORIDE SERPL-SCNC: 116 MMOL/L (ref 97–108)
CO2 SERPL-SCNC: 19 MMOL/L (ref 21–32)
CREAT SERPL-MCNC: 2.65 MG/DL (ref 0.7–1.3)
ERYTHROCYTE [DISTWIDTH] IN BLOOD BY AUTOMATED COUNT: 16.3 % (ref 11.5–14.5)
GLOBULIN SER CALC-MCNC: 2.3 G/DL (ref 2–4)
GLUCOSE BLD STRIP.AUTO-MCNC: 143 MG/DL (ref 65–117)
GLUCOSE BLD STRIP.AUTO-MCNC: 147 MG/DL (ref 65–117)
GLUCOSE BLD STRIP.AUTO-MCNC: 173 MG/DL (ref 65–117)
GLUCOSE SERPL-MCNC: 250 MG/DL (ref 65–100)
HCT VFR BLD AUTO: 30.4 % (ref 36.6–50.3)
HGB BLD-MCNC: 10.5 G/DL (ref 12.1–17)
MAGNESIUM SERPL-MCNC: 2.3 MG/DL (ref 1.6–2.4)
MCH RBC QN AUTO: 31.5 PG (ref 26–34)
MCHC RBC AUTO-ENTMCNC: 34.5 G/DL (ref 30–36.5)
MCV RBC AUTO: 91.3 FL (ref 80–99)
NRBC # BLD: 0 K/UL (ref 0–0.01)
NRBC BLD-RTO: 0 PER 100 WBC
PHOSPHATE SERPL-MCNC: 2.1 MG/DL (ref 2.6–4.7)
PLATELET # BLD AUTO: 60 K/UL (ref 150–400)
POTASSIUM SERPL-SCNC: 3.5 MMOL/L (ref 3.5–5.1)
PROT SERPL-MCNC: 4 G/DL (ref 6.4–8.2)
RBC # BLD AUTO: 3.33 M/UL (ref 4.1–5.7)
SERVICE CMNT-IMP: ABNORMAL
SODIUM SERPL-SCNC: 143 MMOL/L (ref 136–145)
WBC # BLD AUTO: 2.1 K/UL (ref 4.1–11.1)

## 2025-05-15 PROCEDURE — 36415 COLL VENOUS BLD VENIPUNCTURE: CPT

## 2025-05-15 PROCEDURE — 6360000002 HC RX W HCPCS: Performed by: STUDENT IN AN ORGANIZED HEALTH CARE EDUCATION/TRAINING PROGRAM

## 2025-05-15 PROCEDURE — 6370000000 HC RX 637 (ALT 250 FOR IP): Performed by: INTERNAL MEDICINE

## 2025-05-15 PROCEDURE — 6360000002 HC RX W HCPCS: Performed by: NURSE PRACTITIONER

## 2025-05-15 PROCEDURE — 99232 SBSQ HOSP IP/OBS MODERATE 35: CPT | Performed by: PSYCHIATRY & NEUROLOGY

## 2025-05-15 PROCEDURE — 83735 ASSAY OF MAGNESIUM: CPT

## 2025-05-15 PROCEDURE — 2580000003 HC RX 258: Performed by: INTERNAL MEDICINE

## 2025-05-15 PROCEDURE — 6370000000 HC RX 637 (ALT 250 FOR IP): Performed by: STUDENT IN AN ORGANIZED HEALTH CARE EDUCATION/TRAINING PROGRAM

## 2025-05-15 PROCEDURE — 2580000003 HC RX 258: Performed by: STUDENT IN AN ORGANIZED HEALTH CARE EDUCATION/TRAINING PROGRAM

## 2025-05-15 PROCEDURE — 80048 BASIC METABOLIC PNL TOTAL CA: CPT

## 2025-05-15 PROCEDURE — 85027 COMPLETE CBC AUTOMATED: CPT

## 2025-05-15 PROCEDURE — 2500000003 HC RX 250 WO HCPCS: Performed by: STUDENT IN AN ORGANIZED HEALTH CARE EDUCATION/TRAINING PROGRAM

## 2025-05-15 PROCEDURE — 6360000002 HC RX W HCPCS: Performed by: INTERNAL MEDICINE

## 2025-05-15 PROCEDURE — 82962 GLUCOSE BLOOD TEST: CPT

## 2025-05-15 PROCEDURE — 94003 VENT MGMT INPAT SUBQ DAY: CPT

## 2025-05-15 PROCEDURE — 80076 HEPATIC FUNCTION PANEL: CPT

## 2025-05-15 PROCEDURE — 2000000000 HC ICU R&B

## 2025-05-15 PROCEDURE — 84100 ASSAY OF PHOSPHORUS: CPT

## 2025-05-15 PROCEDURE — 82140 ASSAY OF AMMONIA: CPT

## 2025-05-15 RX ORDER — LACTULOSE 10 G/15ML
20 SOLUTION ORAL 3 TIMES DAILY
Status: CANCELLED | OUTPATIENT
Start: 2025-05-15

## 2025-05-15 RX ORDER — DEXTROSE MONOHYDRATE 100 MG/ML
INJECTION, SOLUTION INTRAVENOUS CONTINUOUS PRN
Status: DISCONTINUED | OUTPATIENT
Start: 2025-05-15 | End: 2025-05-19

## 2025-05-15 RX ORDER — AMIODARONE HYDROCHLORIDE 200 MG/1
200 TABLET ORAL 2 TIMES DAILY
Status: DISCONTINUED | OUTPATIENT
Start: 2025-05-15 | End: 2025-05-15

## 2025-05-15 RX ORDER — INSULIN LISPRO 100 [IU]/ML
0-8 INJECTION, SOLUTION INTRAVENOUS; SUBCUTANEOUS
Status: CANCELLED | OUTPATIENT
Start: 2025-05-15

## 2025-05-15 RX ORDER — METOPROLOL TARTRATE 1 MG/ML
5 INJECTION, SOLUTION INTRAVENOUS EVERY 6 HOURS PRN
Status: DISCONTINUED | OUTPATIENT
Start: 2025-05-15 | End: 2025-05-19

## 2025-05-15 RX ORDER — GLUCAGON 1 MG/ML
1 KIT INJECTION PRN
Status: DISCONTINUED | OUTPATIENT
Start: 2025-05-15 | End: 2025-05-19

## 2025-05-15 RX ORDER — AMIODARONE HYDROCHLORIDE 200 MG/1
200 TABLET ORAL DAILY
Status: DISCONTINUED | OUTPATIENT
Start: 2025-05-22 | End: 2025-05-15

## 2025-05-15 RX ORDER — INSULIN LISPRO 100 [IU]/ML
0-4 INJECTION, SOLUTION INTRAVENOUS; SUBCUTANEOUS
Status: DISCONTINUED | OUTPATIENT
Start: 2025-05-15 | End: 2025-05-16

## 2025-05-15 RX ORDER — LEVOTHYROXINE SODIUM 25 UG/1
25 TABLET ORAL EVERY MORNING
Status: DISCONTINUED | OUTPATIENT
Start: 2025-05-16 | End: 2025-05-19

## 2025-05-15 RX ADMIN — SODIUM CHLORIDE, PRESERVATIVE FREE 20 MG: 5 INJECTION INTRAVENOUS at 08:56

## 2025-05-15 RX ADMIN — SODIUM CHLORIDE: 0.9 INJECTION, SOLUTION INTRAVENOUS at 17:36

## 2025-05-15 RX ADMIN — LEVETIRACETAM 750 MG: 100 INJECTION INTRAVENOUS at 21:03

## 2025-05-15 RX ADMIN — CHLORHEXIDINE GLUCONATE 15 ML: 1.2 RINSE ORAL at 08:48

## 2025-05-15 RX ADMIN — FENTANYL CITRATE 50 MCG: 50 INJECTION INTRAMUSCULAR; INTRAVENOUS at 05:34

## 2025-05-15 RX ADMIN — ACETAMINOPHEN 650 MG: 325 TABLET ORAL at 03:08

## 2025-05-15 RX ADMIN — PIPERACILLIN AND TAZOBACTAM 3375 MG: 3; .375 INJECTION, POWDER, LYOPHILIZED, FOR SOLUTION INTRAVENOUS at 17:38

## 2025-05-15 RX ADMIN — THIAMINE HYDROCHLORIDE 200 MG: 100 INJECTION, SOLUTION INTRAMUSCULAR; INTRAVENOUS at 08:54

## 2025-05-15 RX ADMIN — Medication 250 MG: at 17:32

## 2025-05-15 RX ADMIN — Medication: at 08:48

## 2025-05-15 RX ADMIN — Medication 1 AMPULE: at 19:54

## 2025-05-15 RX ADMIN — SODIUM CHLORIDE, PRESERVATIVE FREE 10 ML: 5 INJECTION INTRAVENOUS at 08:48

## 2025-05-15 RX ADMIN — ACETAMINOPHEN 650 MG: 325 TABLET ORAL at 17:46

## 2025-05-15 RX ADMIN — Medication 250 MG: at 13:13

## 2025-05-15 RX ADMIN — PIPERACILLIN AND TAZOBACTAM 3375 MG: 3; .375 INJECTION, POWDER, LYOPHILIZED, FOR SOLUTION INTRAVENOUS at 01:58

## 2025-05-15 RX ADMIN — LEVETIRACETAM 750 MG: 100 INJECTION INTRAVENOUS at 08:56

## 2025-05-15 RX ADMIN — THIAMINE HYDROCHLORIDE 200 MG: 100 INJECTION, SOLUTION INTRAMUSCULAR; INTRAVENOUS at 15:19

## 2025-05-15 RX ADMIN — SODIUM CHLORIDE, PRESERVATIVE FREE 10 ML: 5 INJECTION INTRAVENOUS at 19:54

## 2025-05-15 RX ADMIN — THERA TABS 1 TABLET: TAB at 08:58

## 2025-05-15 RX ADMIN — THIAMINE HYDROCHLORIDE 200 MG: 100 INJECTION, SOLUTION INTRAMUSCULAR; INTRAVENOUS at 21:04

## 2025-05-15 RX ADMIN — FOLIC ACID 1 MG: 1 TABLET ORAL at 08:57

## 2025-05-15 RX ADMIN — CHLORHEXIDINE GLUCONATE 15 ML: 1.2 RINSE ORAL at 19:53

## 2025-05-15 RX ADMIN — Medication 1 AMPULE: at 08:47

## 2025-05-15 RX ADMIN — ASPIRIN 81 MG: 81 TABLET, CHEWABLE ORAL at 08:57

## 2025-05-15 RX ADMIN — Medication: at 19:53

## 2025-05-15 ASSESSMENT — PULMONARY FUNCTION TESTS
PIF_VALUE: 19
PIF_VALUE: 20
PIF_VALUE: 11
PIF_VALUE: 19

## 2025-05-16 LAB
ANION GAP SERPL CALC-SCNC: 8 MMOL/L (ref 2–12)
BUN SERPL-MCNC: 93 MG/DL (ref 6–20)
BUN/CREAT SERPL: 26 (ref 12–20)
CALCIUM SERPL-MCNC: 7.4 MG/DL (ref 8.5–10.1)
CHLORIDE SERPL-SCNC: 118 MMOL/L (ref 97–108)
CHLORIDE UR-SCNC: 27 MMOL/L
CO2 SERPL-SCNC: 19 MMOL/L (ref 21–32)
CREAT SERPL-MCNC: 3.52 MG/DL (ref 0.7–1.3)
CREAT UR-MCNC: 44.4 MG/DL
ERYTHROCYTE [DISTWIDTH] IN BLOOD BY AUTOMATED COUNT: 16.6 % (ref 11.5–14.5)
GLUCOSE BLD STRIP.AUTO-MCNC: 136 MG/DL (ref 65–117)
GLUCOSE BLD STRIP.AUTO-MCNC: 147 MG/DL (ref 65–117)
GLUCOSE BLD STRIP.AUTO-MCNC: 167 MG/DL (ref 65–117)
GLUCOSE BLD STRIP.AUTO-MCNC: 189 MG/DL (ref 65–117)
GLUCOSE SERPL-MCNC: 169 MG/DL (ref 65–100)
HCT VFR BLD AUTO: 31.7 % (ref 36.6–50.3)
HGB BLD-MCNC: 10.9 G/DL (ref 12.1–17)
MAGNESIUM SERPL-MCNC: 2.5 MG/DL (ref 1.6–2.4)
MCH RBC QN AUTO: 31.7 PG (ref 26–34)
MCHC RBC AUTO-ENTMCNC: 34.4 G/DL (ref 30–36.5)
MCV RBC AUTO: 92.2 FL (ref 80–99)
NRBC # BLD: 0 K/UL (ref 0–0.01)
NRBC BLD-RTO: 0 PER 100 WBC
PHOSPHATE SERPL-MCNC: 2.5 MG/DL (ref 2.6–4.7)
PLATELET # BLD AUTO: 67 K/UL (ref 150–400)
PMV BLD AUTO: 12.4 FL (ref 8.9–12.9)
POTASSIUM SERPL-SCNC: 3.7 MMOL/L (ref 3.5–5.1)
RBC # BLD AUTO: 3.44 M/UL (ref 4.1–5.7)
SERVICE CMNT-IMP: ABNORMAL
SODIUM SERPL-SCNC: 145 MMOL/L (ref 136–145)
SODIUM UR-SCNC: 33 MMOL/L
WBC # BLD AUTO: 2.5 K/UL (ref 4.1–11.1)

## 2025-05-16 PROCEDURE — 6360000002 HC RX W HCPCS: Performed by: INTERNAL MEDICINE

## 2025-05-16 PROCEDURE — 84300 ASSAY OF URINE SODIUM: CPT

## 2025-05-16 PROCEDURE — 2580000003 HC RX 258: Performed by: INTERNAL MEDICINE

## 2025-05-16 PROCEDURE — 36415 COLL VENOUS BLD VENIPUNCTURE: CPT

## 2025-05-16 PROCEDURE — 6370000000 HC RX 637 (ALT 250 FOR IP): Performed by: INTERNAL MEDICINE

## 2025-05-16 PROCEDURE — 94003 VENT MGMT INPAT SUBQ DAY: CPT

## 2025-05-16 PROCEDURE — 83735 ASSAY OF MAGNESIUM: CPT

## 2025-05-16 PROCEDURE — 80048 BASIC METABOLIC PNL TOTAL CA: CPT

## 2025-05-16 PROCEDURE — 84100 ASSAY OF PHOSPHORUS: CPT

## 2025-05-16 PROCEDURE — 99232 SBSQ HOSP IP/OBS MODERATE 35: CPT

## 2025-05-16 PROCEDURE — 6370000000 HC RX 637 (ALT 250 FOR IP): Performed by: STUDENT IN AN ORGANIZED HEALTH CARE EDUCATION/TRAINING PROGRAM

## 2025-05-16 PROCEDURE — P9045 ALBUMIN (HUMAN), 5%, 250 ML: HCPCS | Performed by: NURSE PRACTITIONER

## 2025-05-16 PROCEDURE — 82962 GLUCOSE BLOOD TEST: CPT

## 2025-05-16 PROCEDURE — 6370000000 HC RX 637 (ALT 250 FOR IP): Performed by: NURSE PRACTITIONER

## 2025-05-16 PROCEDURE — 2000000000 HC ICU R&B

## 2025-05-16 PROCEDURE — 85027 COMPLETE CBC AUTOMATED: CPT

## 2025-05-16 PROCEDURE — 2500000003 HC RX 250 WO HCPCS: Performed by: STUDENT IN AN ORGANIZED HEALTH CARE EDUCATION/TRAINING PROGRAM

## 2025-05-16 PROCEDURE — 82570 ASSAY OF URINE CREATININE: CPT

## 2025-05-16 PROCEDURE — 82436 ASSAY OF URINE CHLORIDE: CPT

## 2025-05-16 PROCEDURE — P9047 ALBUMIN (HUMAN), 25%, 50ML: HCPCS | Performed by: INTERNAL MEDICINE

## 2025-05-16 PROCEDURE — 6360000002 HC RX W HCPCS: Performed by: NURSE PRACTITIONER

## 2025-05-16 RX ORDER — INSULIN LISPRO 100 [IU]/ML
0-4 INJECTION, SOLUTION INTRAVENOUS; SUBCUTANEOUS EVERY 6 HOURS
Status: DISCONTINUED | OUTPATIENT
Start: 2025-05-17 | End: 2025-05-19

## 2025-05-16 RX ORDER — ALBUMIN HUMAN 50 G/1000ML
25 SOLUTION INTRAVENOUS ONCE
Status: COMPLETED | OUTPATIENT
Start: 2025-05-16 | End: 2025-05-16

## 2025-05-16 RX ORDER — SODIUM BICARBONATE 650 MG/1
650 TABLET ORAL 3 TIMES DAILY
Status: COMPLETED | OUTPATIENT
Start: 2025-05-16 | End: 2025-05-18

## 2025-05-16 RX ORDER — ALBUMIN (HUMAN) 12.5 G/50ML
25 SOLUTION INTRAVENOUS 2 TIMES DAILY
Status: COMPLETED | OUTPATIENT
Start: 2025-05-16 | End: 2025-05-17

## 2025-05-16 RX ORDER — BUMETANIDE 0.25 MG/ML
3 INJECTION, SOLUTION INTRAMUSCULAR; INTRAVENOUS 2 TIMES DAILY
Status: COMPLETED | OUTPATIENT
Start: 2025-05-16 | End: 2025-05-18

## 2025-05-16 RX ADMIN — ACETAMINOPHEN 650 MG: 325 TABLET ORAL at 04:07

## 2025-05-16 RX ADMIN — SODIUM BICARBONATE 650 MG: 650 TABLET ORAL at 13:18

## 2025-05-16 RX ADMIN — ALBUMIN (HUMAN) 25 G: 12.5 INJECTION, SOLUTION INTRAVENOUS at 06:07

## 2025-05-16 RX ADMIN — THERA TABS 1 TABLET: TAB at 09:57

## 2025-05-16 RX ADMIN — PIPERACILLIN AND TAZOBACTAM 3375 MG: 3; .375 INJECTION, POWDER, LYOPHILIZED, FOR SOLUTION INTRAVENOUS at 05:19

## 2025-05-16 RX ADMIN — ASPIRIN 81 MG: 81 TABLET, CHEWABLE ORAL at 09:57

## 2025-05-16 RX ADMIN — ALBUMIN (HUMAN) 25 G: 0.25 INJECTION, SOLUTION INTRAVENOUS at 13:00

## 2025-05-16 RX ADMIN — SODIUM BICARBONATE 650 MG: 650 TABLET ORAL at 20:38

## 2025-05-16 RX ADMIN — FOLIC ACID 1 MG: 1 TABLET ORAL at 09:57

## 2025-05-16 RX ADMIN — Medication: at 08:00

## 2025-05-16 RX ADMIN — LEVOTHYROXINE SODIUM 25 MCG: 0.03 TABLET ORAL at 05:20

## 2025-05-16 RX ADMIN — Medication 1 AMPULE: at 21:07

## 2025-05-16 RX ADMIN — BUMETANIDE 3 MG: 0.25 INJECTION INTRAMUSCULAR; INTRAVENOUS at 13:22

## 2025-05-16 RX ADMIN — LEVETIRACETAM 750 MG: 100 INJECTION INTRAVENOUS at 20:38

## 2025-05-16 RX ADMIN — BUMETANIDE 3 MG: 0.25 INJECTION INTRAMUSCULAR; INTRAVENOUS at 18:47

## 2025-05-16 RX ADMIN — THIAMINE HYDROCHLORIDE 200 MG: 100 INJECTION, SOLUTION INTRAMUSCULAR; INTRAVENOUS at 15:34

## 2025-05-16 RX ADMIN — SODIUM CHLORIDE, PRESERVATIVE FREE 10 ML: 5 INJECTION INTRAVENOUS at 09:53

## 2025-05-16 RX ADMIN — PIPERACILLIN AND TAZOBACTAM 3375 MG: 3; .375 INJECTION, POWDER, LYOPHILIZED, FOR SOLUTION INTRAVENOUS at 17:21

## 2025-05-16 RX ADMIN — ALBUMIN (HUMAN) 25 G: 0.25 INJECTION, SOLUTION INTRAVENOUS at 20:41

## 2025-05-16 RX ADMIN — Medication 1 AMPULE: at 09:50

## 2025-05-16 RX ADMIN — CHLORHEXIDINE GLUCONATE 15 ML: 1.2 RINSE ORAL at 09:25

## 2025-05-16 RX ADMIN — THIAMINE HYDROCHLORIDE 200 MG: 100 INJECTION, SOLUTION INTRAMUSCULAR; INTRAVENOUS at 20:38

## 2025-05-16 RX ADMIN — THIAMINE HYDROCHLORIDE 200 MG: 100 INJECTION, SOLUTION INTRAMUSCULAR; INTRAVENOUS at 09:54

## 2025-05-16 RX ADMIN — LEVETIRACETAM 750 MG: 100 INJECTION INTRAVENOUS at 09:50

## 2025-05-16 RX ADMIN — Medication: at 21:08

## 2025-05-16 RX ADMIN — INSULIN LISPRO 1 UNITS: 100 INJECTION, SOLUTION INTRAVENOUS; SUBCUTANEOUS at 23:55

## 2025-05-16 RX ADMIN — SODIUM CHLORIDE, PRESERVATIVE FREE 20 ML: 5 INJECTION INTRAVENOUS at 21:08

## 2025-05-16 RX ADMIN — CHLORHEXIDINE GLUCONATE 15 ML: 1.2 RINSE ORAL at 21:07

## 2025-05-16 ASSESSMENT — PULMONARY FUNCTION TESTS
PIF_VALUE: 19
PIF_VALUE: 20
PIF_VALUE: 23

## 2025-05-16 ASSESSMENT — PAIN SCALES - GENERAL
PAINLEVEL_OUTOF10: 0

## 2025-05-16 NOTE — INTERDISCIPLINARY ROUNDS
Critical care interdisciplinary rounds today.  Following members present: Case Management, Nursing, Nutrition, Pharmacy, and Physician. Family invited to participate. (Daughter) Annemarie noted for retention,, remains on Ventilator.   PICC line noted for multiple drips and access.   Plan of care discussed.  See clinical pathway for plan of care and interventions and desired outcomes.

## 2025-05-16 NOTE — CONSULTS
[facility]              [address] [place] [address]                              CONSULTATION      PATIENT NAME: DALTON DUARTE                : 1935  MED REC NO: [id number]                       ROOM: 2510  ACCOUNT NO: [id number]                       ADMIT DATE: 2025  PROVIDER: Inder Avitia MD    DATE OF SERVICE:  2025    ATTENDING PHYSICIAN:  CARSON WOLFE    REASON FOR CONSULTATION:  Acute renal failure.    HISTORY OF PRESENT ILLNESS:  The patient is an 89-year-old male with past medical history of GERD, who was admitted with failure to thrive.  The patient has developed acute kidney injury.  His creatinine level has increased to 3.52 with BUN of 93 today.  The patient was transferred to the ICU for seizures.  He is currently intubated and sedated.  The patient has developed atrial fibrillation with rapid ventricular response during this hospitalization.  The patient was diagnosed with colitis and was started on antibiotic.  He has received multiple antibiotics including vancomycin.  The patient is diagnosed with diarrhea and proctitis.  He has received multiple CT scans with IV contrast.  He had a CT of abdomen and pelvis with IV contrast on May 11th and he had a CTA of head on May 12th.  The patient's urine output has dropped in the last few days.    PAST MEDICAL HISTORY:    1. Gastroesophageal reflux disease.  2. Peripheral neuropathy.  3. History of tonsillar cancer.  4. History of hypertension and hyperlipidemia.    PAST SURGICAL HISTORY:    1. Cataract surgery.  2. Radical tonsillectomy.  3. Hernia surgery.    SOCIAL HISTORY:  Former smoker.    FAMILY HISTORY:  Cannot be obtained due to patient's condition.    REVIEW OF SYSTEMS:  Cannot be obtained due to patient's condition.    HOME MEDICATIONS:  Amlodipine, aspirin, Lipitor.    PHYSICAL EXAMINATION:  VITAL SIGNS:  Temperature 99.3, pulse 76, blood pressure 116/53, respiratory rate 
     CRITICAL CARE NOTE      Name: Aubrey Bell III   : 1935   MRN: [id number]   Date: 2025        PRINCIPAL ICU DIAGNOSIS   Acute Hypoxic respiratory failure  Seizure  Atrial fibrillation with RVR  Severe sepsis/shock, gastroenteritis with proctitis  Acute kidney injury  Thrombocytopenia'  Acute urinary retention    BRIEF PATIENT SUMMARY   89 y/o male PMHx of Tonsillar cancer s/p chemo/radiation, chronic alcohol use,  R PCA stroke (residual ataxia), HTN, and HLD  admitted () by hospital medicine for sepsis secondary to acute gastroenteritis with proctitis after presenting with progressive weakness, lethargy and diarrhea x 1 week.  Cardiology consulted for elevated troponin and A-fib RVR on diltiazem infusion.  Rapid response evening  for upper extremity/facial twitching right facial droop, left gaze preference not crossing midline.  Per daughter facial droop/AMS had been occurring for over a day, rapid EEG applied +63 % seizure burden, administered Keppra and Ativan with seizure cessation.  Remained tachypneic, not protecting airway, patient's daughter updated at bedside advised okay with short-term intubation. ICU consulted    Initial ICU evaluation: Patient obtunded, tachypneic, shallow respirations, hypotensive, Afib RVR rates 140s , not protecting airway reconfirmed with daughter at bedside okay with short-term intubation , placed emergently on mechanical ventilation for airway protection.  CT/CTA H/N- pending     COMPREHENSIVE ASSESSMENT & PLAN:SYSTEM BASED     Acute hypoxic respiratory failure requiring intubation for airway protection in setting of seizure ( -current)   CXR-no acute findings  Lung protective ventilation with goal plateau pressure < 30, driving pressure < 15  As needed bronchodilators    Severe sepsis/shock in setting of gastroenteritis with proctitis  Clinically appears dry as well as hypotensive, administer IV crystalloid bolus, continue at 100 cc/hour.  Levophed to 
  [facility]         NAME:Aubrey Bell III  MRN:[id number]   :1935    [id number]  Olga due to atn-- iv dye x 2 and vanco ?  Resp failure    Plan  Give iv bumex  Follow bmp            Blood pressure (!) 94/52, pulse 79, temperature 99.3 °F (37.4 °C), temperature source Axillary, resp. rate 28, height 1.753 m (5' 9\"), weight 66.4 kg (146 lb 6.2 oz), SpO2 95%.    Failure to thrive in adult [R62.7]  OLGA (acute kidney injury) [N17.9]      Thank You.    Inder Avitia MD   
 Neurology Note    Patient ID:  Aubrey Bell III  [id number]  89 y.o.  7/14/1935      Date of Consultation:  May 13, 2025    Referring Physician: Dr. Marley     Reason for Consultation:  altered mental status, seizure      Assessment and Plan:    The patient is a 89-year-old male with multiple medical conditions who was transferred from an outside hospital due to progressive weakness, lethargy and diarrhea.  Patient did have new onset seizure overnight.  Patient is currently intubated and sedated in the intensive care unit      New onset seizure:  Differential includes systemic impact of ongoing infection, metabolic derangements  Would be concerned about the possibility of a new stroke given new onset A-fib with RVR leading to the seizure  Head CT with chronic microvascular ischemic disease  CTA with mild carotid stenosis and intracranial stenosis  The patient should receive a brain MRI when able. Order placed  Continue Keppra 750 mg twice a day  Rapid EEG with initial seizure activity but no seizures for the remainder of the study  I will order a full channel EEG for this afternoon  Continue 81 mg aspirin  Lipid panel and hemoglobin A1c ordered  Continue correcting metabolic derangements  Monitor for signs and symptoms of alcohol withdrawal  Currently on antibiotics.  ID has been consulted.     Afib with rvr  History of tonsillar cancer  Thrombocytopenia  Acute respiratory failure  sepsis      Neurology will continue to follow closely in this complicated patient with ongoing neurological symptoms necessitating additional testing. Updated orders placed.  Care plan discussed with primary team, Dr. Marley           Subjective: intubated, sedated       History of Present Illness:   Aubrey Bell III is a 89 y.o. male who presents as a transfer from [facility] due to progressive generalized weakness lethargy and diarrhea.  He had become unable to walk around his house.  This was his second visit 
Palliative Medicine  Patient Name: Aubrey Bell III  YOB: 1935  MRN: [id number]  Age: 89 y.o.  Gender: male    Date of Initial Consult: 5/12/2025  Date of Service: 5/12/2025  Time: 1:51 PM  Provider: SHABBIR Mejias CNP  Hospital Day: 2  Admit Date: 5/11/2025  Referring Provider: Aubrey Grier DO      Reasons for Consultation:  Goals of Care and Other: Code status    HISTORY OF PRESENT ILLNESS (HPI):   Aubrey Bell III is a 89 y.o. male with a past medical history of hypertension and previous right PCA ischemic infarct, who was transferred from [facility] on 5/11/2025 with complaints of progressively worsening weakness, lethargy, diarrheal illness.     Psychosocial: Patient  to spouse, Fanny x 55 years - they have four adult children (Peace, Gregor, Jonny, and Glenny).  He worked many years as a  before retiring - he was also an avid       PALLIATIVE DIAGNOSES:    A-Fib  Hypoalbuminemia  Generalized weakness  Physical debility  DNR  Goals of care  Advance care planning    ASSESSMENT AND PLAN:   Palliative team has been asked to meet with Aubrey Bell III to address goals of care.  Reviewed medical chart including admit H&P, consultant notes, MAR, and recent labs/imaging.  Mr. Bell was seen and evaluated, wife, Fanny and daughter, Glenny at bedside. Introduced self and role of palliative.   At time of my arrival, he was resting in bed in no acute distress.  He is awake with eyes open, but not verbal and not following commands.  Per daughter he has not spoke any words as of Saturday.      Understanding of Illness/Discussion: We discussed the patient's condition in detail.   Daughter and wife verbalized a clear understanding of hospital events including issues leading to admission.   Wife noted that she brought patient to ED a week ago for poor PO intake, nausea, and vomiting - he was discharged home but a few days later he got very weak and she had to assist him to 
Pt seen and examined     Full consult dictated    Likely type 2 MI secondary to severe dehydration, acute illness    Echo  Cont telemetry   
underlying severe illness.  I would continue telemetry and I would get an echocardiogram to rule out any underlying cardiac dysfunction.  Otherwise, no specific therapy or workup needed.  Presumably, the troponin will start to trend down once the patient's underlying illness improves.    Thank you for the consult.        BECKY WARD MD      BKH/AQS  D:  05/11/2025 15:10:12  T:  05/11/2025 15:42:35  JOB #:  [id number]    CC:   [facility] Chart        Becky Ward MD  
abdomen are stable.    Impression  Satisfactory endotracheal tube and enteric tube placement. Clear lungs.      Electronically signed by Dylan Parsons          Greater than 50% of the time was spent on the following:  Preparing for visit and chart review.  Obtaining and/or reviewing separately obtained history  Performing a medically appropriate exam and/or evaluation  Counseling and educating a patient/family/caregiver as noted above  Placing relevant orders  Referring and communicating with other professionals (not separately reported)  Independently interpreting results (not separately reported) and communicating results to the patient/family/caregiver  Care coordination (not separately reported) as noted above  Documenting clinical information in the electronic health records (e.g. problem list, visit note) on the day of the encounter       Ora Cabral MD FACP

## 2025-05-17 LAB
ALBUMIN SERPL-MCNC: 2.2 G/DL (ref 3.5–5)
ALBUMIN/GLOB SERPL: 1 (ref 1.1–2.2)
ALP SERPL-CCNC: 117 U/L (ref 45–117)
ALT SERPL-CCNC: 129 U/L (ref 12–78)
ANION GAP SERPL CALC-SCNC: 12 MMOL/L (ref 2–12)
AST SERPL-CCNC: 663 U/L (ref 15–37)
BACTERIA SPEC CULT: NORMAL
BACTERIA SPEC CULT: NORMAL
BASOPHILS # BLD: 0 K/UL (ref 0–0.1)
BASOPHILS NFR BLD: 0 % (ref 0–1)
BILIRUB DIRECT SERPL-MCNC: 0.3 MG/DL (ref 0–0.2)
BILIRUB SERPL-MCNC: 0.7 MG/DL (ref 0.2–1)
BUN SERPL-MCNC: 125 MG/DL (ref 6–20)
BUN/CREAT SERPL: 29 (ref 12–20)
CALCIUM SERPL-MCNC: 7.5 MG/DL (ref 8.5–10.1)
CHLORIDE SERPL-SCNC: 114 MMOL/L (ref 97–108)
CO2 SERPL-SCNC: 17 MMOL/L (ref 21–32)
CREAT SERPL-MCNC: 4.3 MG/DL (ref 0.7–1.3)
DIFFERENTIAL METHOD BLD: ABNORMAL
EOSINOPHIL # BLD: 0 K/UL (ref 0–0.4)
EOSINOPHIL NFR BLD: 0 % (ref 0–7)
ERYTHROCYTE [DISTWIDTH] IN BLOOD BY AUTOMATED COUNT: 16.7 % (ref 11.5–14.5)
GLOBULIN SER CALC-MCNC: 2.3 G/DL (ref 2–4)
GLUCOSE BLD STRIP.AUTO-MCNC: 203 MG/DL (ref 65–117)
GLUCOSE BLD STRIP.AUTO-MCNC: 221 MG/DL (ref 65–117)
GLUCOSE BLD STRIP.AUTO-MCNC: 229 MG/DL (ref 65–117)
GLUCOSE BLD STRIP.AUTO-MCNC: 282 MG/DL (ref 65–117)
GLUCOSE SERPL-MCNC: 204 MG/DL (ref 65–100)
HCT VFR BLD AUTO: 20.7 % (ref 36.6–50.3)
HGB BLD-MCNC: 7.2 G/DL (ref 12.1–17)
IMM GRANULOCYTES # BLD AUTO: 0 K/UL (ref 0–0.04)
IMM GRANULOCYTES NFR BLD AUTO: 0 % (ref 0–0.5)
LYMPHOCYTES # BLD: 0.56 K/UL (ref 0.8–3.5)
LYMPHOCYTES NFR BLD: 15 % (ref 12–49)
MCH RBC QN AUTO: 32 PG (ref 26–34)
MCHC RBC AUTO-ENTMCNC: 34.8 G/DL (ref 30–36.5)
MCV RBC AUTO: 92 FL (ref 80–99)
MONOCYTES # BLD: 0.04 K/UL (ref 0–1)
MONOCYTES NFR BLD: 1 % (ref 5–13)
NEUTS BAND NFR BLD MANUAL: 7 %
NEUTS SEG # BLD: 3.1 K/UL (ref 1.8–8)
NEUTS SEG NFR BLD: 77 % (ref 32–75)
NRBC # BLD: 0 K/UL (ref 0–0.01)
NRBC BLD-RTO: 0 PER 100 WBC
PHOSPHATE SERPL-MCNC: 2.5 MG/DL (ref 2.6–4.7)
PLATELET # BLD AUTO: 59 K/UL (ref 150–400)
PLATELET COMMENT: ABNORMAL
PMV BLD AUTO: 12.6 FL (ref 8.9–12.9)
POTASSIUM SERPL-SCNC: 3.6 MMOL/L (ref 3.5–5.1)
PROT SERPL-MCNC: 4.5 G/DL (ref 6.4–8.2)
RBC # BLD AUTO: 2.25 M/UL (ref 4.1–5.7)
RBC MORPH BLD: ABNORMAL
SERVICE CMNT-IMP: ABNORMAL
SERVICE CMNT-IMP: NORMAL
SERVICE CMNT-IMP: NORMAL
SODIUM SERPL-SCNC: 143 MMOL/L (ref 136–145)
VANCOMYCIN SERPL-MCNC: 9.2 UG/ML
WBC # BLD AUTO: 3.7 K/UL (ref 4.1–11.1)

## 2025-05-17 PROCEDURE — 6370000000 HC RX 637 (ALT 250 FOR IP): Performed by: INTERNAL MEDICINE

## 2025-05-17 PROCEDURE — 2500000003 HC RX 250 WO HCPCS: Performed by: STUDENT IN AN ORGANIZED HEALTH CARE EDUCATION/TRAINING PROGRAM

## 2025-05-17 PROCEDURE — 80048 BASIC METABOLIC PNL TOTAL CA: CPT

## 2025-05-17 PROCEDURE — 80076 HEPATIC FUNCTION PANEL: CPT

## 2025-05-17 PROCEDURE — 6360000002 HC RX W HCPCS: Performed by: INTERNAL MEDICINE

## 2025-05-17 PROCEDURE — 2580000003 HC RX 258: Performed by: INTERNAL MEDICINE

## 2025-05-17 PROCEDURE — 6370000000 HC RX 637 (ALT 250 FOR IP): Performed by: NURSE PRACTITIONER

## 2025-05-17 PROCEDURE — 94003 VENT MGMT INPAT SUBQ DAY: CPT

## 2025-05-17 PROCEDURE — 36415 COLL VENOUS BLD VENIPUNCTURE: CPT

## 2025-05-17 PROCEDURE — 6370000000 HC RX 637 (ALT 250 FOR IP): Performed by: STUDENT IN AN ORGANIZED HEALTH CARE EDUCATION/TRAINING PROGRAM

## 2025-05-17 PROCEDURE — 2580000003 HC RX 258: Performed by: STUDENT IN AN ORGANIZED HEALTH CARE EDUCATION/TRAINING PROGRAM

## 2025-05-17 PROCEDURE — 80202 ASSAY OF VANCOMYCIN: CPT

## 2025-05-17 PROCEDURE — 85025 COMPLETE CBC W/AUTO DIFF WBC: CPT

## 2025-05-17 PROCEDURE — 84100 ASSAY OF PHOSPHORUS: CPT

## 2025-05-17 PROCEDURE — P9047 ALBUMIN (HUMAN), 25%, 50ML: HCPCS | Performed by: INTERNAL MEDICINE

## 2025-05-17 PROCEDURE — 82962 GLUCOSE BLOOD TEST: CPT

## 2025-05-17 PROCEDURE — 6360000002 HC RX W HCPCS: Performed by: NURSE PRACTITIONER

## 2025-05-17 PROCEDURE — 2000000000 HC ICU R&B

## 2025-05-17 RX ADMIN — ASPIRIN 81 MG: 81 TABLET, CHEWABLE ORAL at 09:22

## 2025-05-17 RX ADMIN — SODIUM BICARBONATE 650 MG: 650 TABLET ORAL at 15:15

## 2025-05-17 RX ADMIN — INSULIN LISPRO 2 UNITS: 100 INJECTION, SOLUTION INTRAVENOUS; SUBCUTANEOUS at 23:27

## 2025-05-17 RX ADMIN — ALBUMIN (HUMAN) 25 G: 0.25 INJECTION, SOLUTION INTRAVENOUS at 20:27

## 2025-05-17 RX ADMIN — SODIUM CHLORIDE, PRESERVATIVE FREE 10 ML: 5 INJECTION INTRAVENOUS at 20:28

## 2025-05-17 RX ADMIN — SODIUM CHLORIDE: 0.9 INJECTION, SOLUTION INTRAVENOUS at 02:29

## 2025-05-17 RX ADMIN — LEVETIRACETAM 750 MG: 100 INJECTION INTRAVENOUS at 20:31

## 2025-05-17 RX ADMIN — PIPERACILLIN AND TAZOBACTAM 3375 MG: 3; .375 INJECTION, POWDER, LYOPHILIZED, FOR SOLUTION INTRAVENOUS at 05:37

## 2025-05-17 RX ADMIN — INSULIN LISPRO 1 UNITS: 100 INJECTION, SOLUTION INTRAVENOUS; SUBCUTANEOUS at 12:33

## 2025-05-17 RX ADMIN — INSULIN LISPRO 1 UNITS: 100 INJECTION, SOLUTION INTRAVENOUS; SUBCUTANEOUS at 05:32

## 2025-05-17 RX ADMIN — Medication 100 MG: at 09:23

## 2025-05-17 RX ADMIN — THERA TABS 1 TABLET: TAB at 09:22

## 2025-05-17 RX ADMIN — LEVOTHYROXINE SODIUM 25 MCG: 0.03 TABLET ORAL at 06:20

## 2025-05-17 RX ADMIN — SODIUM CHLORIDE, PRESERVATIVE FREE 10 ML: 5 INJECTION INTRAVENOUS at 09:19

## 2025-05-17 RX ADMIN — FOLIC ACID 1 MG: 1 TABLET ORAL at 09:22

## 2025-05-17 RX ADMIN — Medication 1 AMPULE: at 09:19

## 2025-05-17 RX ADMIN — ALBUMIN (HUMAN) 25 G: 0.25 INJECTION, SOLUTION INTRAVENOUS at 09:40

## 2025-05-17 RX ADMIN — Medication: at 20:27

## 2025-05-17 RX ADMIN — INSULIN LISPRO 1 UNITS: 100 INJECTION, SOLUTION INTRAVENOUS; SUBCUTANEOUS at 18:24

## 2025-05-17 RX ADMIN — CHLORHEXIDINE GLUCONATE 15 ML: 1.2 RINSE ORAL at 20:30

## 2025-05-17 RX ADMIN — SODIUM BICARBONATE 650 MG: 650 TABLET ORAL at 20:27

## 2025-05-17 RX ADMIN — Medication: at 09:23

## 2025-05-17 RX ADMIN — BUMETANIDE 3 MG: 0.25 INJECTION INTRAMUSCULAR; INTRAVENOUS at 18:24

## 2025-05-17 RX ADMIN — LEVETIRACETAM 750 MG: 100 INJECTION INTRAVENOUS at 09:27

## 2025-05-17 RX ADMIN — Medication 1 AMPULE: at 20:29

## 2025-05-17 RX ADMIN — PIPERACILLIN AND TAZOBACTAM 3375 MG: 3; .375 INJECTION, POWDER, LYOPHILIZED, FOR SOLUTION INTRAVENOUS at 17:04

## 2025-05-17 RX ADMIN — CHLORHEXIDINE GLUCONATE 15 ML: 1.2 RINSE ORAL at 09:19

## 2025-05-17 RX ADMIN — SODIUM BICARBONATE 650 MG: 650 TABLET ORAL at 09:23

## 2025-05-17 RX ADMIN — BUMETANIDE 3 MG: 0.25 INJECTION INTRAMUSCULAR; INTRAVENOUS at 09:32

## 2025-05-17 ASSESSMENT — PAIN SCALES - GENERAL
PAINLEVEL_OUTOF10: 0
PAINLEVEL_OUTOF10: 1
PAINLEVEL_OUTOF10: 0
PAINLEVEL_OUTOF10: 0
PAINLEVEL_OUTOF10: 1
PAINLEVEL_OUTOF10: 0
PAINLEVEL_OUTOF10: 0

## 2025-05-17 ASSESSMENT — PULMONARY FUNCTION TESTS
PIF_VALUE: 20
PIF_VALUE: 11
PIF_VALUE: 11
PIF_VALUE: 20
PIF_VALUE: 19
PIF_VALUE: 20

## 2025-05-18 LAB
ANION GAP SERPL CALC-SCNC: 8 MMOL/L (ref 2–12)
APTT PPP: 50.6 SEC (ref 22.1–31)
BUN SERPL-MCNC: 150 MG/DL (ref 6–20)
BUN/CREAT SERPL: 29 (ref 12–20)
CALCIUM SERPL-MCNC: 7.5 MG/DL (ref 8.5–10.1)
CHLORIDE SERPL-SCNC: 114 MMOL/L (ref 97–108)
CO2 SERPL-SCNC: 20 MMOL/L (ref 21–32)
CREAT SERPL-MCNC: 5.14 MG/DL (ref 0.7–1.3)
ERYTHROCYTE [DISTWIDTH] IN BLOOD BY AUTOMATED COUNT: 16.7 % (ref 11.5–14.5)
ERYTHROCYTE [DISTWIDTH] IN BLOOD BY AUTOMATED COUNT: 16.8 % (ref 11.5–14.5)
FIBRINOGEN PPP-MCNC: 236 MG/DL (ref 200–475)
GLUCOSE BLD STRIP.AUTO-MCNC: 277 MG/DL (ref 65–117)
GLUCOSE BLD STRIP.AUTO-MCNC: 283 MG/DL (ref 65–117)
GLUCOSE BLD STRIP.AUTO-MCNC: 297 MG/DL (ref 65–117)
GLUCOSE SERPL-MCNC: 232 MG/DL (ref 65–100)
HCT VFR BLD AUTO: 14 % (ref 36.6–50.3)
HCT VFR BLD AUTO: 14.3 % (ref 36.6–50.3)
HGB BLD-MCNC: 4.9 G/DL (ref 12.1–17)
HGB BLD-MCNC: 4.9 G/DL (ref 12.1–17)
HISTORY CHECK: NORMAL
INR PPP: 1 (ref 0.9–1.1)
LDH SERPL L TO P-CCNC: 826 U/L (ref 85–241)
MAGNESIUM SERPL-MCNC: 2.6 MG/DL (ref 1.6–2.4)
MCH RBC QN AUTO: 31.6 PG (ref 26–34)
MCH RBC QN AUTO: 32.2 PG (ref 26–34)
MCHC RBC AUTO-ENTMCNC: 34.3 G/DL (ref 30–36.5)
MCHC RBC AUTO-ENTMCNC: 35 G/DL (ref 30–36.5)
MCV RBC AUTO: 92.1 FL (ref 80–99)
MCV RBC AUTO: 92.3 FL (ref 80–99)
NRBC # BLD: 0 K/UL (ref 0–0.01)
NRBC # BLD: 0.03 K/UL (ref 0–0.01)
NRBC BLD-RTO: 0 PER 100 WBC
NRBC BLD-RTO: 0.7 PER 100 WBC
PERIPHERAL SMEAR, MD REVIEW: NORMAL
PHOSPHATE SERPL-MCNC: 2.7 MG/DL (ref 2.6–4.7)
PLATELET # BLD AUTO: 55 K/UL (ref 150–400)
PLATELET # BLD AUTO: 57 K/UL (ref 150–400)
POTASSIUM SERPL-SCNC: 3.6 MMOL/L (ref 3.5–5.1)
PROTHROMBIN TIME: 10.9 SEC (ref 9.2–11.2)
RBC # BLD AUTO: 1.52 M/UL (ref 4.1–5.7)
RBC # BLD AUTO: 1.55 M/UL (ref 4.1–5.7)
SERVICE CMNT-IMP: ABNORMAL
SODIUM SERPL-SCNC: 142 MMOL/L (ref 136–145)
THERAPEUTIC RANGE: ABNORMAL SECS (ref 58–77)
WBC # BLD AUTO: 3.7 K/UL (ref 4.1–11.1)
WBC # BLD AUTO: 4.1 K/UL (ref 4.1–11.1)

## 2025-05-18 PROCEDURE — 85610 PROTHROMBIN TIME: CPT

## 2025-05-18 PROCEDURE — 85730 THROMBOPLASTIN TIME PARTIAL: CPT

## 2025-05-18 PROCEDURE — 86923 COMPATIBILITY TEST ELECTRIC: CPT

## 2025-05-18 PROCEDURE — 6370000000 HC RX 637 (ALT 250 FOR IP): Performed by: INTERNAL MEDICINE

## 2025-05-18 PROCEDURE — 36415 COLL VENOUS BLD VENIPUNCTURE: CPT

## 2025-05-18 PROCEDURE — 94003 VENT MGMT INPAT SUBQ DAY: CPT

## 2025-05-18 PROCEDURE — 2580000003 HC RX 258: Performed by: INTERNAL MEDICINE

## 2025-05-18 PROCEDURE — 6360000002 HC RX W HCPCS: Performed by: NURSE PRACTITIONER

## 2025-05-18 PROCEDURE — 83735 ASSAY OF MAGNESIUM: CPT

## 2025-05-18 PROCEDURE — 86850 RBC ANTIBODY SCREEN: CPT

## 2025-05-18 PROCEDURE — 85027 COMPLETE CBC AUTOMATED: CPT

## 2025-05-18 PROCEDURE — 6370000000 HC RX 637 (ALT 250 FOR IP): Performed by: NURSE PRACTITIONER

## 2025-05-18 PROCEDURE — 6360000002 HC RX W HCPCS: Performed by: INTERNAL MEDICINE

## 2025-05-18 PROCEDURE — 84100 ASSAY OF PHOSPHORUS: CPT

## 2025-05-18 PROCEDURE — 83615 LACTATE (LD) (LDH) ENZYME: CPT

## 2025-05-18 PROCEDURE — 30233N1 TRANSFUSION OF NONAUTOLOGOUS RED BLOOD CELLS INTO PERIPHERAL VEIN, PERCUTANEOUS APPROACH: ICD-10-PCS | Performed by: NURSE PRACTITIONER

## 2025-05-18 PROCEDURE — 86900 BLOOD TYPING SEROLOGIC ABO: CPT

## 2025-05-18 PROCEDURE — P9045 ALBUMIN (HUMAN), 5%, 250 ML: HCPCS | Performed by: INTERNAL MEDICINE

## 2025-05-18 PROCEDURE — 2500000003 HC RX 250 WO HCPCS: Performed by: STUDENT IN AN ORGANIZED HEALTH CARE EDUCATION/TRAINING PROGRAM

## 2025-05-18 PROCEDURE — 80048 BASIC METABOLIC PNL TOTAL CA: CPT

## 2025-05-18 PROCEDURE — 82962 GLUCOSE BLOOD TEST: CPT

## 2025-05-18 PROCEDURE — 6370000000 HC RX 637 (ALT 250 FOR IP): Performed by: STUDENT IN AN ORGANIZED HEALTH CARE EDUCATION/TRAINING PROGRAM

## 2025-05-18 PROCEDURE — 85384 FIBRINOGEN ACTIVITY: CPT

## 2025-05-18 PROCEDURE — 86901 BLOOD TYPING SEROLOGIC RH(D): CPT

## 2025-05-18 PROCEDURE — 2000000000 HC ICU R&B

## 2025-05-18 RX ORDER — SODIUM CHLORIDE 9 MG/ML
INJECTION, SOLUTION INTRAVENOUS PRN
Status: DISCONTINUED | OUTPATIENT
Start: 2025-05-18 | End: 2025-05-19

## 2025-05-18 RX ORDER — ALBUMIN HUMAN 50 G/1000ML
25 SOLUTION INTRAVENOUS ONCE
Status: COMPLETED | OUTPATIENT
Start: 2025-05-18 | End: 2025-05-18

## 2025-05-18 RX ORDER — NOREPINEPHRINE BITARTRATE 0.06 MG/ML
1-100 INJECTION, SOLUTION INTRAVENOUS CONTINUOUS
Status: DISCONTINUED | OUTPATIENT
Start: 2025-05-18 | End: 2025-05-19

## 2025-05-18 RX ADMIN — LEVETIRACETAM 750 MG: 100 INJECTION INTRAVENOUS at 20:53

## 2025-05-18 RX ADMIN — Medication 1 AMPULE: at 20:08

## 2025-05-18 RX ADMIN — ALBUMIN (HUMAN) 25 G: 12.5 INJECTION, SOLUTION INTRAVENOUS at 16:46

## 2025-05-18 RX ADMIN — FOLIC ACID 1 MG: 1 TABLET ORAL at 09:32

## 2025-05-18 RX ADMIN — THERA TABS 1 TABLET: TAB at 09:32

## 2025-05-18 RX ADMIN — PIPERACILLIN AND TAZOBACTAM 3375 MG: 3; .375 INJECTION, POWDER, LYOPHILIZED, FOR SOLUTION INTRAVENOUS at 17:27

## 2025-05-18 RX ADMIN — Medication 1 AMPULE: at 09:37

## 2025-05-18 RX ADMIN — SODIUM CHLORIDE, PRESERVATIVE FREE 10 ML: 5 INJECTION INTRAVENOUS at 09:36

## 2025-05-18 RX ADMIN — Medication: at 09:50

## 2025-05-18 RX ADMIN — INSULIN LISPRO 2 UNITS: 100 INJECTION, SOLUTION INTRAVENOUS; SUBCUTANEOUS at 17:52

## 2025-05-18 RX ADMIN — FENTANYL CITRATE 50 MCG: 50 INJECTION INTRAMUSCULAR; INTRAVENOUS at 01:35

## 2025-05-18 RX ADMIN — Medication 100 MG: at 09:32

## 2025-05-18 RX ADMIN — LEVETIRACETAM 750 MG: 100 INJECTION INTRAVENOUS at 09:36

## 2025-05-18 RX ADMIN — CHLORHEXIDINE GLUCONATE 15 ML: 1.2 RINSE ORAL at 20:08

## 2025-05-18 RX ADMIN — ASPIRIN 81 MG: 81 TABLET, CHEWABLE ORAL at 09:32

## 2025-05-18 RX ADMIN — PIPERACILLIN AND TAZOBACTAM 3375 MG: 3; .375 INJECTION, POWDER, LYOPHILIZED, FOR SOLUTION INTRAVENOUS at 05:51

## 2025-05-18 RX ADMIN — BUMETANIDE 3 MG: 0.25 INJECTION INTRAMUSCULAR; INTRAVENOUS at 09:41

## 2025-05-18 RX ADMIN — LEVOTHYROXINE SODIUM 25 MCG: 0.03 TABLET ORAL at 05:51

## 2025-05-18 RX ADMIN — FENTANYL CITRATE 50 MCG: 50 INJECTION INTRAMUSCULAR; INTRAVENOUS at 19:04

## 2025-05-18 RX ADMIN — Medication: at 20:09

## 2025-05-18 RX ADMIN — INSULIN LISPRO 2 UNITS: 100 INJECTION, SOLUTION INTRAVENOUS; SUBCUTANEOUS at 05:37

## 2025-05-18 RX ADMIN — INSULIN LISPRO 2 UNITS: 100 INJECTION, SOLUTION INTRAVENOUS; SUBCUTANEOUS at 12:49

## 2025-05-18 RX ADMIN — SODIUM BICARBONATE 650 MG: 650 TABLET ORAL at 15:59

## 2025-05-18 RX ADMIN — SODIUM BICARBONATE 650 MG: 650 TABLET ORAL at 09:32

## 2025-05-18 RX ADMIN — SODIUM BICARBONATE 650 MG: 650 TABLET ORAL at 20:09

## 2025-05-18 RX ADMIN — CHLORHEXIDINE GLUCONATE 15 ML: 1.2 RINSE ORAL at 09:36

## 2025-05-18 RX ADMIN — SODIUM CHLORIDE, PRESERVATIVE FREE 10 ML: 5 INJECTION INTRAVENOUS at 20:09

## 2025-05-18 ASSESSMENT — PAIN SCALES - GENERAL
PAINLEVEL_OUTOF10: 0
PAINLEVEL_OUTOF10: 1
PAINLEVEL_OUTOF10: 3
PAINLEVEL_OUTOF10: 2
PAINLEVEL_OUTOF10: 0

## 2025-05-18 ASSESSMENT — PULMONARY FUNCTION TESTS
PIF_VALUE: 20
PIF_VALUE: 16
PIF_VALUE: 14
PIF_VALUE: 16
PIF_VALUE: 16
PIF_VALUE: 20
PIF_VALUE: 21

## 2025-05-19 VITALS
OXYGEN SATURATION: 82 % | HEIGHT: 69 IN | DIASTOLIC BLOOD PRESSURE: 47 MMHG | RESPIRATION RATE: 34 BRPM | BODY MASS INDEX: 21.68 KG/M2 | SYSTOLIC BLOOD PRESSURE: 111 MMHG | TEMPERATURE: 97.7 F | HEART RATE: 100 BPM | WEIGHT: 146.39 LBS

## 2025-05-19 PROBLEM — Z66 DNR (DO NOT RESUSCITATE): Status: ACTIVE | Noted: 2025-05-19

## 2025-05-19 LAB
ANION GAP SERPL CALC-SCNC: 10 MMOL/L (ref 2–12)
BUN SERPL-MCNC: 167 MG/DL (ref 6–20)
BUN/CREAT SERPL: 28 (ref 12–20)
CALCIUM SERPL-MCNC: 7.6 MG/DL (ref 8.5–10.1)
CHLORIDE SERPL-SCNC: 112 MMOL/L (ref 97–108)
CO2 SERPL-SCNC: 20 MMOL/L (ref 21–32)
CREAT SERPL-MCNC: 5.95 MG/DL (ref 0.7–1.3)
ERYTHROCYTE [DISTWIDTH] IN BLOOD BY AUTOMATED COUNT: 16.8 % (ref 11.5–14.5)
GLUCOSE BLD STRIP.AUTO-MCNC: 239 MG/DL (ref 65–117)
GLUCOSE BLD STRIP.AUTO-MCNC: 265 MG/DL (ref 65–117)
GLUCOSE SERPL-MCNC: 217 MG/DL (ref 65–100)
HCT VFR BLD AUTO: 10.7 % (ref 36.6–50.3)
HGB BLD-MCNC: 3.6 G/DL (ref 12.1–17)
MAGNESIUM SERPL-MCNC: 2.9 MG/DL (ref 1.6–2.4)
MCH RBC QN AUTO: 31 PG (ref 26–34)
MCHC RBC AUTO-ENTMCNC: 33.6 G/DL (ref 30–36.5)
MCV RBC AUTO: 92.2 FL (ref 80–99)
NRBC # BLD: 0.03 K/UL (ref 0–0.01)
NRBC BLD-RTO: 0.7 PER 100 WBC
PHOSPHATE SERPL-MCNC: 3.5 MG/DL (ref 2.6–4.7)
PLATELET # BLD AUTO: 64 K/UL (ref 150–400)
POTASSIUM SERPL-SCNC: 3.7 MMOL/L (ref 3.5–5.1)
RBC # BLD AUTO: 1.16 M/UL (ref 4.1–5.7)
SERVICE CMNT-IMP: ABNORMAL
SERVICE CMNT-IMP: ABNORMAL
SODIUM SERPL-SCNC: 142 MMOL/L (ref 136–145)
WBC # BLD AUTO: 4.3 K/UL (ref 4.1–11.1)

## 2025-05-19 PROCEDURE — 6360000002 HC RX W HCPCS: Performed by: INTERNAL MEDICINE

## 2025-05-19 PROCEDURE — 2580000003 HC RX 258: Performed by: INTERNAL MEDICINE

## 2025-05-19 PROCEDURE — 80048 BASIC METABOLIC PNL TOTAL CA: CPT

## 2025-05-19 PROCEDURE — 6370000000 HC RX 637 (ALT 250 FOR IP): Performed by: INTERNAL MEDICINE

## 2025-05-19 PROCEDURE — 36415 COLL VENOUS BLD VENIPUNCTURE: CPT

## 2025-05-19 PROCEDURE — 82962 GLUCOSE BLOOD TEST: CPT

## 2025-05-19 PROCEDURE — 99232 SBSQ HOSP IP/OBS MODERATE 35: CPT

## 2025-05-19 PROCEDURE — 6370000000 HC RX 637 (ALT 250 FOR IP): Performed by: STUDENT IN AN ORGANIZED HEALTH CARE EDUCATION/TRAINING PROGRAM

## 2025-05-19 PROCEDURE — 84100 ASSAY OF PHOSPHORUS: CPT

## 2025-05-19 PROCEDURE — 85027 COMPLETE CBC AUTOMATED: CPT

## 2025-05-19 PROCEDURE — 6360000002 HC RX W HCPCS: Performed by: NURSE PRACTITIONER

## 2025-05-19 PROCEDURE — 2500000003 HC RX 250 WO HCPCS: Performed by: STUDENT IN AN ORGANIZED HEALTH CARE EDUCATION/TRAINING PROGRAM

## 2025-05-19 PROCEDURE — 94003 VENT MGMT INPAT SUBQ DAY: CPT

## 2025-05-19 PROCEDURE — 83735 ASSAY OF MAGNESIUM: CPT

## 2025-05-19 PROCEDURE — 6360000002 HC RX W HCPCS

## 2025-05-19 PROCEDURE — 6370000000 HC RX 637 (ALT 250 FOR IP): Performed by: NURSE PRACTITIONER

## 2025-05-19 RX ORDER — MIDAZOLAM HYDROCHLORIDE 1 MG/ML
1 INJECTION, SOLUTION INTRAVENOUS CONTINUOUS
Status: DISCONTINUED | OUTPATIENT
Start: 2025-05-19 | End: 2025-05-19

## 2025-05-19 RX ORDER — GLYCOPYRROLATE 0.2 MG/ML
0.2 INJECTION INTRAMUSCULAR; INTRAVENOUS EVERY 4 HOURS PRN
Status: DISCONTINUED | OUTPATIENT
Start: 2025-05-19 | End: 2025-05-19 | Stop reason: HOSPADM

## 2025-05-19 RX ORDER — MORPHINE SULFATE 2 MG/ML
1 INJECTION, SOLUTION INTRAMUSCULAR; INTRAVENOUS
Status: DISCONTINUED | OUTPATIENT
Start: 2025-05-19 | End: 2025-05-19

## 2025-05-19 RX ORDER — HALOPERIDOL 5 MG/ML
1 INJECTION INTRAMUSCULAR
Status: DISCONTINUED | OUTPATIENT
Start: 2025-05-19 | End: 2025-05-19 | Stop reason: HOSPADM

## 2025-05-19 RX ORDER — MORPHINE SULFATE 2 MG/ML
1 INJECTION, SOLUTION INTRAMUSCULAR; INTRAVENOUS
Status: DISCONTINUED | OUTPATIENT
Start: 2025-05-19 | End: 2025-05-19 | Stop reason: HOSPADM

## 2025-05-19 RX ORDER — DIAZEPAM 10 MG/2ML
2 INJECTION, SOLUTION INTRAMUSCULAR; INTRAVENOUS EVERY 30 MIN PRN
Status: DISCONTINUED | OUTPATIENT
Start: 2025-05-19 | End: 2025-05-19 | Stop reason: HOSPADM

## 2025-05-19 RX ADMIN — LEVETIRACETAM 750 MG: 100 INJECTION INTRAVENOUS at 08:30

## 2025-05-19 RX ADMIN — SODIUM CHLORIDE, PRESERVATIVE FREE 10 ML: 5 INJECTION INTRAVENOUS at 08:22

## 2025-05-19 RX ADMIN — DIAZEPAM 2 MG: 5 INJECTION, SOLUTION INTRAMUSCULAR; INTRAVENOUS at 11:54

## 2025-05-19 RX ADMIN — PIPERACILLIN AND TAZOBACTAM 3375 MG: 3; .375 INJECTION, POWDER, LYOPHILIZED, FOR SOLUTION INTRAVENOUS at 06:24

## 2025-05-19 RX ADMIN — INSULIN LISPRO 2 UNITS: 100 INJECTION, SOLUTION INTRAVENOUS; SUBCUTANEOUS at 00:23

## 2025-05-19 RX ADMIN — CHLORHEXIDINE GLUCONATE 15 ML: 1.2 RINSE ORAL at 08:23

## 2025-05-19 RX ADMIN — Medication: at 08:23

## 2025-05-19 RX ADMIN — MORPHINE SULFATE 1 MG: 2 INJECTION, SOLUTION INTRAMUSCULAR; INTRAVENOUS at 12:50

## 2025-05-19 RX ADMIN — MORPHINE SULFATE 1 MG: 2 INJECTION, SOLUTION INTRAMUSCULAR; INTRAVENOUS at 12:31

## 2025-05-19 RX ADMIN — FOLIC ACID 1 MG: 1 TABLET ORAL at 08:21

## 2025-05-19 RX ADMIN — MORPHINE SULFATE 1 MG: 2 INJECTION, SOLUTION INTRAMUSCULAR; INTRAVENOUS at 12:11

## 2025-05-19 RX ADMIN — ASPIRIN 81 MG: 81 TABLET, CHEWABLE ORAL at 08:21

## 2025-05-19 RX ADMIN — INSULIN LISPRO 1 UNITS: 100 INJECTION, SOLUTION INTRAVENOUS; SUBCUTANEOUS at 06:31

## 2025-05-19 RX ADMIN — LEVOTHYROXINE SODIUM 25 MCG: 0.03 TABLET ORAL at 06:31

## 2025-05-19 RX ADMIN — Medication 100 MG: at 08:21

## 2025-05-19 RX ADMIN — DIAZEPAM 2 MG: 5 INJECTION, SOLUTION INTRAMUSCULAR; INTRAVENOUS at 13:06

## 2025-05-19 RX ADMIN — THERA TABS 1 TABLET: TAB at 08:21

## 2025-05-19 RX ADMIN — Medication 1 AMPULE: at 08:23

## 2025-05-19 RX ADMIN — GLYCOPYRROLATE 0.2 MG: 0.2 INJECTION INTRAMUSCULAR; INTRAVENOUS at 12:31

## 2025-05-19 RX ADMIN — MORPHINE SULFATE 1 MG: 2 INJECTION, SOLUTION INTRAMUSCULAR; INTRAVENOUS at 13:06

## 2025-05-19 RX ADMIN — MORPHINE SULFATE 1 MG: 2 INJECTION, SOLUTION INTRAMUSCULAR; INTRAVENOUS at 11:54

## 2025-05-19 ASSESSMENT — PAIN SCALES - GENERAL
PAINLEVEL_OUTOF10: 2
PAINLEVEL_OUTOF10: 0

## 2025-05-19 ASSESSMENT — PULMONARY FUNCTION TESTS
PIF_VALUE: 20
PIF_VALUE: 20
PIF_VALUE: 12
PIF_VALUE: 14

## 2025-05-19 NOTE — INTERDISCIPLINARY ROUNDS
Interdisciplinary team rounds were held 5/19/2025 with the following team members: Physician, Nursing, Dietitian, Pharmacist, , and the CCU Charge RN.    Plan of care discussed. See clinical pathway and/or care plan for interventions and desired outcomes.    Goals of the Day: Family to decide on GOC.

## 2025-05-19 NOTE — DISCHARGE SUMMARY
hospital medicine for sepsis secondary to acute gastroenteritis with proctitis after presenting with progressive weakness, lethargy and diarrhea x 1 week. Cardiology consulted for elevated troponin and A-fib RVR on diltiazem infusion. Rapid response evening  for upper extremity/facial twitching right facial droop, left gaze preference not crossing midline. Per daughter facial droop/AMS had been occurring for over a day, rapid EEG applied +63 % seizure burden, administered Keppra and Ativan with seizure cessation. Remained tachypneic, not protecting airway patient's daughter updated at bedside advised okay with short-term intubation.  Patient remained obtunded despite being off sedation.  Not able to extubate.  Family decided to pursue comfort care because he did not want to live on machines.  Patient passed away today after compassionate extubation.        Condition at discharge:         Timothy Ramon MD  [facility] Critical [facility]

## 2025-05-19 NOTE — PLAN OF CARE
Problem: ABCDS Injury Assessment  Goal: Absence of physical injury  Outcome: Progressing  Flowsheets (Taken 5/17/2025 2000)  Absence of Physical Injury: Implement safety measures based on patient assessment       Problem: Respiratory - Adult  Goal: Achieves optimal ventilation and oxygenation  5/18/2025 0102 by Umesh Marie, RN  Outcome: Progressing      Problem: Safety - Medical Restraint  Goal: Remains free of injury from restraints (Restraint for Interference with Medical Device)  Description: INTERVENTIONS:1. Determine that other, less restrictive measures have been tried or would not be effective before applying the restraint2. Evaluate the patient's condition at the time of restraint application3. Inform patient/family regarding the reason for restraint4. Q2H: Monitor safety, psychosocial status, comfort, nutrition and hydration  5/18/2025 0102 by Umesh Marie, RN  Outcome: Progressing           
  Problem: Discharge Planning  Goal: Discharge to home or other facility with appropriate resources  Outcome: Progressing     Problem: Safety - Adult  Goal: Free from fall injury  5/11/2025 2137 by Ginger Gómez RN  Outcome: Progressing  5/11/2025 1005 by Amber Eid RN  Outcome: Progressing     Problem: Skin/Tissue Integrity  Goal: Skin integrity remains intact  Description: 1.  Monitor for areas of redness and/or skin breakdown2.  Assess vascular access sites hourly3.  Every 4-6 hours minimum:  Change oxygen saturation probe site4.  Every 4-6 hours:  If on nasal continuous positive airway pressure, respiratory therapy assess nares and determine need for appliance change or resting period  5/11/2025 2137 by Ginger Gómez RN  Outcome: Progressing  5/11/2025 1005 by Amber Eid RN  Outcome: Progressing     Problem: ABCDS Injury Assessment  Goal: Absence of physical injury  5/11/2025 2137 by Ginger Gómez RN  Outcome: Progressing  5/11/2025 1005 by Amber Eid RN  Outcome: Progressing     Problem: Pain  Goal: Verbalizes/displays adequate comfort level or baseline comfort level  Outcome: Progressing     Problem: Confusion  Goal: Confusion, delirium, dementia, or psychosis is improved or at baseline  Description: INTERVENTIONS:1. Assess for possible contributors to thought disturbance, including medications, impaired vision or hearing, underlying metabolic abnormalities, dehydration, psychiatric diagnoses, and notify attending LIP2. [place] high risk fall precautions, as indicated3. Provide frequent short contacts to provide reality reorientation, refocusing and direction4. Decrease environmental stimuli, including noise as appropriate5. Monitor and intervene to maintain adequate nutrition, hydration, elimination, sleep and activity6. If unable to ensure safety without constant attention obtain sitter and review sitter guidelines with assigned personnel7. Initiate Psychosocial CNS and 
  Problem: Respiratory - Adult  Goal: Achieves optimal ventilation and oxygenation  5/17/2025 0137 by Umesh Marie, RN  Outcome: Progressing  5/16/2025 2129 by Anabel Teague, RT  Outcome: Progressing  Flowsheets (Taken 5/16/2025 2000 by Umesh Marie, RN)  Achieves optimal ventilation and oxygenation:   Assess for changes in respiratory status   Assess for changes in mentation and behavior   Position to facilitate oxygenation and minimize respiratory effort     Problem: Cardiovascular - Adult  Goal: Maintains optimal cardiac output and hemodynamic stability  Outcome: Progressing     Problem: Genitourinary - Adult  Goal: Urinary catheter remains patent  Outcome: Progressing     
  Problem: Respiratory - Adult  Goal: Achieves optimal ventilation and oxygenation  5/19/2025 0053 by Garcia Watson RN  Outcome: Progressing  Flowsheets (Taken 5/19/2025 0053)  Achieves optimal ventilation and oxygenation:   Assess for changes in respiratory status   Assess for changes in mentation and behavior   Position to facilitate oxygenation and minimize respiratory effort   Oxygen supplementation based on oxygen saturation or arterial blood gases   Encourage broncho-pulmonary hygiene including cough, deep breathe, incentive spirometry   Assess the need for suctioning and aspirate as needed   Assess and instruct to report shortness of breath or any respiratory difficulty   Respiratory therapy support as indicated.      Problem: Neurosensory - Adult  Goal: Absence of seizures  Outcome: Progressing  Flowsheets  Taken 5/19/2025 0052  Absence of seizures:   Monitor for seizure activity.  If seizure occurs, document type and location of movements and any associated apnea   If seizure occurs, turn head to side and suction secretions as needed   Administer anticonvulsants as ordered   Diagnostic studies as ordered.      Problem: Safety - Adult  Goal: Free from fall injury  Outcome: Progressing  Flowsheets  Taken 5/19/2025 0053  Free From Fall Injury:   Instruct family/caregiver on patient safety   Based on caregiver fall risk screen, instruct family/caregiver to ask for assistance with transferring infant if caregiver noted to have fall risk factors.    
  Problem: Safety - Adult  Goal: Free from fall injury  Outcome: Progressing     Problem: Skin/Tissue Integrity  Goal: Skin integrity remains intact  Description: 1.  Monitor for areas of redness and/or skin breakdown2.  Assess vascular access sites hourly3.  Every 4-6 hours minimum:  Change oxygen saturation probe site4.  Every 4-6 hours:  If on nasal continuous positive airway pressure, respiratory therapy assess nares and determine need for appliance change or resting period  Outcome: Progressing     Problem: ABCDS Injury Assessment  Goal: Absence of physical injury  Outcome: Progressing     
  Problem: Safety - Medical Restraint  Goal: Remains free of injury from restraints (Restraint for Interference with Medical Device)  Description: INTERVENTIONS:1. Determine that other, less restrictive measures have been tried or would not be effective before applying the restraint2. Evaluate the patient's condition at the time of restraint application3. Inform patient/family regarding the reason for restraint4. Q2H: Monitor safety, psychosocial status, comfort, nutrition and hydration  Outcome: Completed     
  Problem: Safety - Medical Restraint  Goal: Remains free of injury from restraints (Restraint for Interference with Medical Device)  Description: INTERVENTIONS:1. Determine that other, less restrictive measures have been tried or would not be effective before applying the restraint2. Evaluate the patient's condition at the time of restraint application3. Inform patient/family regarding the reason for restraint4. Q2H: Monitor safety, psychosocial status, comfort, nutrition and hydration  Outcome: Progressing     
  Problem: Safety - Medical Restraint  Goal: Remains free of injury from restraints (Restraint for Interference with Medical Device)  Description: INTERVENTIONS:1. Determine that other, less restrictive measures have been tried or would not be effective before applying the restraint2. Evaluate the patient's condition at the time of restraint application3. Inform patient/family regarding the reason for restraint4. Q2H: Monitor safety, psychosocial status, comfort, nutrition and hydration  Outcome: Progressing  Flowsheets (Taken 5/17/2025 1009 by Amy Tyler RN)  Remains free of injury from restraints (restraint for interference with medical device):   Determine that other, less restrictive measures have been tried or would not be effective before applying the restraint   Evaluate the patient's condition at the time of restraint application   Inform patient/family regarding the reason for restraint   Every 2 hours: Monitor safety, psychosocial status, comfort, nutrition and hydration     
  Problem: Safety - Medical Restraint  Goal: Remains free of injury from restraints (Restraint for Interference with Medical Device)  Description: INTERVENTIONS:1. Determine that other, less restrictive measures have been tried or would not be effective before applying the restraint2. Evaluate the patient's condition at the time of restraint application3. Inform patient/family regarding the reason for restraint4. Q2H: Monitor safety, psychosocial status, comfort, nutrition and hydration  Recent Flowsheet Documentation  Taken 5/13/2025 2157 by Ariana Britt RN  Remains free of injury from restraints (restraint for interference with medical device):   Determine that other, less restrictive measures have been tried or would not be effective before applying the restraint   Evaluate the patient's condition at the time of restraint application   Every 2 hours: Monitor safety, psychosocial status, comfort, nutrition and hydration   Inform patient/family regarding the reason for restraint     
  Problem: Safety - Medical Restraint  Goal: Remains free of injury from restraints (Restraint for Interference with Medical Device)  Description: INTERVENTIONS:1. Determine that other, less restrictive measures have been tried or would not be effective before applying the restraint2. Evaluate the patient's condition at the time of restraint application3. Inform patient/family regarding the reason for restraint4. Q2H: Monitor safety, psychosocial status, comfort, nutrition and hydration  Recent Flowsheet Documentation  Taken 5/14/2025 2000 by Ariana Britt RN  Remains free of injury from restraints (restraint for interference with medical device):   Determine that other, less restrictive measures have been tried or would not be effective before applying the restraint   Evaluate the patient's condition at the time of restraint application   Inform patient/family regarding the reason for restraint   Every 2 hours: Monitor safety, psychosocial status, comfort, nutrition and hydration  5/14/2025 1029 by Ariana Marks RN  Outcome: Progressing  Flowsheets (Taken 5/14/2025 1018 by Bethany Raya RN)  Remains free of injury from restraints (restraint for interference with medical device):   Determine that other, less restrictive measures have been tried or would not be effective before applying the restraint   Evaluate the patient's condition at the time of restraint application   Inform patient/family regarding the reason for restraint   Every 2 hours: Monitor safety, psychosocial status, comfort, nutrition and hydration     
  Problem: Safety - Medical Restraint  Goal: Remains free of injury from restraints (Restraint for Interference with Medical Device)  Description: INTERVENTIONS:1. Determine that other, less restrictive measures have been tried or would not be effective before applying the restraint2. Evaluate the patient's condition at the time of restraint application3. Inform patient/family regarding the reason for restraint4. Q2H: Monitor safety, psychosocial status, comfort, nutrition and hydration  Recent Flowsheet Documentation  Taken 5/15/2025 2000 by Ariana Britt RN  Remains free of injury from restraints (restraint for interference with medical device):   Determine that other, less restrictive measures have been tried or would not be effective before applying the restraint   Evaluate the patient's condition at the time of restraint application   Inform patient/family regarding the reason for restraint   Every 2 hours: Monitor safety, psychosocial status, comfort, nutrition and hydration     
Speech LAnguage Pathology EVALUATION    Patient: Aubrey Bell III (89 y.o. male)  Date: 5/12/2025  Primary Diagnosis: Failure to thrive in adult [R62.7]  OLGA (acute kidney injury) [N17.9]    Precautions: Aspiration, Fall, Nonverbal    ASSESSMENT:  Patient presents with oropharyngeal dysphagia during limited clinical swallow evaluation consisting of two ice chips. Patient with significant oral-motor deficits impacting labial seal (bolus containment), lingual movement (bolus manipulation and propulsion), and mandibular movement (mastication of ice chip). Patient with prolonged cough response on second, larger ice chip, which raises concern for aspiration. 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 He also has a past medical history of chemoradiation which increases patient's risk for development of fibrosis or lymphedema, particularly with disuse. Patient's family reported patient did not have significant pharyngeal swallowing issues prior to admission and was on an unrestricted PO diet until ~1 week prior when GI symptoms began. At this juncture, recommend NPO except occasional ice chips with RN or SLP only. Patient will benefit from rigorous oral care via suction toothbrush while NPO as an aspiration precaution. Would consider initiating discussions regarding nutrition goals of care with patient/family.     Patient will benefit from skilled intervention to address the above impairments.     PLAN :  Recommendations and Planned Interventions:  Diet: NPO except ice chips with RN or SLP only  Continue non-oral medication route  Oral care via suction toothbrush 3-4 times/daily  Consider discussion re: nutrition goals of care     Recommend next SLP session: Swallow re-assessment, Dysphagia education    Acute SLP Services: SLP Plan of Care: 4 times/week. Patient's rehabilitation potential is considered to be Guarded.  Discharge Recommendations: Continue to assess 
free of injury related to seizures activity  Outcome: Progressing  Goal: Achieves maximal functionality and self care  Outcome: Progressing     Problem: Skin/Tissue Integrity - Adult  Goal: Skin integrity remains intact  Description: 1.  Monitor for areas of redness and/or skin breakdown2.  Assess vascular access sites hourly3.  Every 4-6 hours minimum:  Change oxygen saturation probe site4.  Every 4-6 hours:  If on nasal continuous positive airway pressure, respiratory therapy assess nares and determine need for appliance change or resting period  5/11/2025 1005 by Amber Eid, RN  Outcome: Progressing  5/11/2025 0456 by Mei Parr, RN  Outcome: Progressing     
airway pressure, respiratory therapy assess nares and determine need for appliance change or resting period  5/12/2025 1030 by Bethany Holden RN  Outcome: Progressing  5/11/2025 2137 by Ginger Gómez RN  Outcome: Progressing  Goal: Incisions, wounds, or drain sites healing without S/S of infection  5/12/2025 1030 by Bethany Holden RN  Outcome: Progressing  5/11/2025 2137 by Ginger Gómez RN  Outcome: Progressing  Goal: Oral mucous membranes remain intact  5/12/2025 1030 by Bethany Holden RN  Outcome: Progressing  5/11/2025 2137 by Ginger Gómez RN  Outcome: Progressing     Problem: Musculoskeletal - Adult  Goal: Return mobility to safest level of function  5/12/2025 1030 by Bethany Holden RN  Outcome: Progressing  5/11/2025 2137 by Ginger Gómez RN  Outcome: Progressing  Goal: Maintain proper alignment of affected body part  5/12/2025 1030 by Bethany Holden RN  Outcome: Progressing  5/11/2025 2137 by Ginger Gómez RN  Outcome: Progressing  Goal: Return ADL status to a safe level of function  5/12/2025 1030 by Bethany Holden RN  Outcome: Progressing  5/11/2025 2137 by Ginger Gómez RN  Outcome: Progressing     Problem: Gastrointestinal - Adult  Goal: Minimal or absence of nausea and vomiting  5/12/2025 1030 by Bethany Holden RN  Outcome: Progressing  5/11/2025 2137 by Ginger Gómez RN  Outcome: Progressing  Goal: Maintains or returns to baseline bowel function  5/12/2025 1030 by Bethany Holden RN  Outcome: Progressing  5/11/2025 2137 by Ginger Gómez RN  Outcome: Progressing  Goal: Maintains adequate nutritional intake  5/12/2025 1030 by Bethany Holden RN  Outcome: Progressing  5/11/2025 2137 by Ginger Gómez RN  Outcome: Progressing  Goal: Establish and maintain optimal ostomy function  5/12/2025 1030 by Bethany Holden RN  Outcome: Progressing  5/11/2025 2137 by Ginger Gómez RN  Outcome: Progressing     Problem: Genitourinary - Adult  Goal: Absence of urinary retention  5/12/2025 1030 by Navin,

## 2025-05-19 NOTE — PROGRESS NOTES
Critical Care Progress Note  Sakina Coyle MD          Date of Service:  2025  NAME:  Aubrey Bell III  :  1935  MRN:  [id number]      Subjective/Hospital course:      88 y/o male PMHx of Tonsillar cancer s/p chemo/radiation, chronic alcohol use,  R PCA stroke (residual ataxia), HTN, and HLD  admitted () by hospital medicine for sepsis secondary to acute gastroenteritis with proctitis after presenting with progressive weakness, lethargy and diarrhea x 1 week.  Cardiology consulted for elevated troponin and A-fib RVR on diltiazem infusion.  Rapid response evening  for upper extremity/facial twitching right facial droop, left gaze preference not crossing midline.  Per daughter facial droop/AMS had been occurring for over a day, rapid EEG applied +63 % seizure burden, administered Keppra and Ativan with seizure cessation.  Remained tachypneic, not protecting airway, patient's daughter updated at bedside advised okay with short-term intubation. ICU consulted     - several minutes of seizure activity on rapid. CT head negative for bleed.  Seizure activity stopped after intubation / benzo / propofol    : Off vasopressors since last night. Off sedation since 3 am. Not waking up. Not following commands.       Assessment/Plan:     PULMONARY:  Intubated for airway protection  Cxr on admit without acute disease       Plan  - Vent settings established, reviewed and/or adjusted as per orders  - Supplemental O2 to maintain SpO2 92-97%  - Daily SBT/SAT( Failed SBT today due to mentation)       CARDIOVASCULAR/HEMODYNAMIC:  Afib RVR    Current vasopressors: Off since last night     Plan  - Continue amiodarone gtt   - ICU hemodynamic/cardiac monitoring  - MAP goal > 65 mmHg      RENAL:  OLGA- Worsening   Decreased UOP        Plan  - Monitor I/Os and Uo  - Monitor renal function panel intermittently  - Correct electrolytes as needed  - Avoid nephrotoxic meds  - Will do POCUS and decide to 
              End of Shift Note    Bedside shift change report given to Holly MAYS  (oncoming nurse) by Bethany Holden RN (offgoing nurse).  Report included the following information SBAR and MAR    Shift worked:  Days      Shift summary and any significant changes:     0830 Not able to swallow failed swallow screen MD Dukes notified   1255 Held Dilt  HR in 80's BP 91/44 MD Dukes notified will resume if HR is in 120's verbal order   1601 Tyleneol rectal 101.5 Temp MD Dukes notified   1436 Called ID consult not sure if it was called   1800 Family and Speech noticed facial ticks MD Dukes notified      Concerns for physician to address:       Zone phone for oncoming shift:          Activity:  Level of Assistance: Dependent, patient does less than 25%  Number times ambulated in hallways past shift: 0  Number of times OOB to chair past shift: 0    Cardiac:   Cardiac Monitoring: Yes      Cardiac Rhythm: A fib RVR    Access:  Current line(s): PIV     Genitourinary:   Urinary Status: Voiding, External catheter    Respiratory:   O2 Device: None (Room air)  Chronic home O2 use?: NO  Incentive spirometer at bedside: N/A    GI:  Last BM (including prior to admit): 05/11/25  Current diet:  No diet orders on file  Passing flatus: YES    Pain Management:   Patient states pain is manageable on current regimen: N/A    Skin:  Jeff Scale Score: 11  Interventions: Wound Offloading (Prevention Methods): Bed, pressure reduction mattress, Turning, Repositioning, Pillows    Patient Safety:  Fall Risk: Nursing Judgement-Fall Risk High(Add Comments): Yes  Fall Risk Interventions  Nursing Judgement-Fall Risk High(Add Comments): Yes  Toilet Every 2 Hours-In Advance of Need: Yes  Hourly Visual Checks: Confused, In bed  Fall Visual Posted: Armband, Socks, Fall sign posted  Room Door Open: Deferred to decrease stimulation  Alarm On: Bed, Chair  Patient Moved Closer to Nursing Station: No    Active Consults:   IP CONSULT TO 
     Nutrition Note    Chart reviewed, case discussed during CCU rounds.  Pt with likely FTT prior to admission, now intubated and on levo at 20.  Will likely need a second pressor per IDR discussion.  Inappropriate for nutrition support initiation today.  Plan is to obtain more IV access.  Will address possible nutrition support tomorrow pending stabilization.  Will continue to follow closely.     Electronically signed by Rubina Kang RD, [facility] on 5/13/25 at 12:08 PM EDT    Contact: ext [phone]    
    Nocturnist/cross cover provider called to Room 2143 as RRT was reported to be called for seizure.     Patient Vitals for the past 8 hrs:   Temp Temp src   05/12/25 1835 100.3 °F (37.9 °C) Axillary   05/12/25 1730 99.7 °F (37.6 °C) Axillary   05/12/25 1701 (!) 101.3 °F (38.5 °C) Axillary   05/12/25 1602 (!) 101.5 °F (38.6 °C) Axillary       Intake/Output Summary (Last 24 hours) at 5/12/2025 2203  Last data filed at 5/12/2025 1835  Gross per 24 hour   Intake 10 ml   Output 1050 ml   Net -1040 ml         BACKGROUND/ SITUATION:  Attending provider following patient: Chata Dukes MD     89 y.o. male h/o  has a past medical history of GERD (gastroesophageal reflux disease), Raynaud disease, and Tonsil cancer (HCC).   admitted 5/11/2025 for Failure to thrive in adult [R62.7]  OLGA (acute kidney injury) [N17.9].  See prior hospitalist group notes for complete details of course of treatment.      FINDINGS:  Notified by RN of AMS and tachypnea. Evaluated patient at bedside, twitching noted primarily in upper extremities and face, he withdrawals from pain, aphasic, R facial droop, eyes do not cross midline with L gaze preference. Findings were concerning for stroke or seizure. I discussed this with his daughter who states the facial droop and AMS have been ongoing for over a day, deferred code stroke, and she stated the twitching is new since earlier today but seems to be getting worse. She denies hx of seizure and states in the past week or two he has significantly declined, he was mowing the lawn with a push mower a couple weeks ago. Rapid response called for additional assistance at bedside.    Total 12 point ROS reviewed, and was negative aside from as mentioned above.    Physical Examination:   General appearance: altered, withdrawals to pain, aphasia   Head: Atraumatic, normocephalic  Eyes: PERRL. Do not cross midline, L gaze preference. No scleral icterus.    ENT: Oral mucosa is moist and free of lesions.  No 
    [email or web address]    [facility]   Nephrology Progress Note  Pt Name : Aubrey Bell III  Date of Admission : 5/11/2025    CC:  Follow up for OLGA        Assessment and Plan   1. Acute kidney injury due to acute tubular necrosis from IV contrast and, hypotension, possible Vanco toxicity.  2. History of hypertension.  3. History of chronic kidney disease stage 2, baseline creatinine 1.1.  4. Atrial fibrillation with rapid ventricular response.  5. Seizure disorder.  6. Gastroenteritis with proctitis.  7. Thrombocytopenia.  8. Respiratory failure, on vent.     Plan :  - continue Bumex 3 mg BID  - control A fib   - not a candidate for dialysis   - transition to comfort care when family ready     Discussed with :    For other plans, see orders.    Interval History:  Seen and examined. Remains on vent. Poorly responsive./ Cr worse     Review of Systems: Review of systems not obtained due to patient factors.    Current Facility-Administered Medications   Medication Dose Route Frequency    albumin human 25% IV solution 25 g  25 g IntraVENous BID    bumetanide (BUMEX) injection 3 mg  3 mg IntraVENous BID    sodium bicarbonate tablet 650 mg  650 mg Oral TID    insulin lispro (HUMALOG,ADMELOG) injection vial 0-4 Units  0-4 Units SubCUTAneous Q6H    piperacillin-tazobactam (ZOSYN) 3,375 mg in sodium chloride 0.9 % 50 mL IVPB (addEASE)  3,375 mg IntraVENous Q12H    levothyroxine (SYNTHROID) tablet 25 mcg  25 mcg Orogastric QAM    Levothyroxine - Hold TF for 1 hour before and after levothyroxine adminstration   Other BID    glucose chewable tablet 16 g  4 tablet Oral PRN    dextrose bolus 10% 125 mL  125 mL IntraVENous PRN    Or    dextrose bolus 10% 250 mL  250 mL IntraVENous PRN    glucagon injection 1 mg  1 mg SubCUTAneous PRN    dextrose 10 % infusion   IntraVENous Continuous PRN    metoprolol (LOPRESSOR) injection 5 mg  5 mg IntraVENous Q6H PRN    alcohol 
   05/16/25 0436   B: Both Spontaneous Awakening and Breathing Trials   Was Patient Receiving Mechanical Ventilation Yes   Safety Screening Spontaneous Breathing Trial (SBT) Proceed with SBT - no exclusion criteria met   Spontaneous  mL   Spontaneous Breathing Trial (SBT) Outcome RSBI>105 - SBT failure  (rsbi 107)   Patient Meet Extubation Criteria No   Reasons Failed Extubation Criteria Failed SAT and/or SBT   Weaning Parameters   Spontaneous Breathing Trial Complete Yes   Respiratory Rate Observed 36   Ve 13.4      RSBI 107       
   05/17/25 0455   B: Both Spontaneous Awakening and Breathing Trials   Did Patient Receive Sedative and/or Opioid IV Medications in the Last 24 Hours No   Safety Screening Spontaneous Awakening Trial (SAT) Proceed with SAT - no exclusion criteria met   Spontaneous Awakening Trial (SAT) Outcome Respiratory distress - SAT failure   Was Patient Receiving Mechanical Ventilation Yes   Safety Screening Spontaneous Breathing Trial (SBT)   (increased RR in the 30\"s)   RT Notified Ready for SBT Yes       
   05/18/25 0414   B: Both Spontaneous Awakening and Breathing Trials   Was Patient Receiving Mechanical Ventilation Yes   Safety Screening Spontaneous Breathing Trial (SBT) Proceed with SBT - no exclusion criteria met   Spontaneous  mL   RSBI Calculated 92.04   Spontaneous Breathing Trial (SBT) Outcome Respiratory rate greater than 35/min for 5 min or more - SBT failure   Patient Meet Extubation Criteria No     Pt placed on SBT on Cpap/ps 5/5 40%, pt failed on those settings due to increased RR >40 and lower tidal volumes. Ps increased to 8/5 and pt tolerating better w/ RR 28-35 and increase in tidal volumes. RN at bedside and aware.  
   05/19/25 0425 05/19/25 0528   B: Both Spontaneous Awakening and Breathing Trials   Was Patient Receiving Mechanical Ventilation Yes  --    Safety Screening Spontaneous Breathing Trial (SBT) Proceed with SBT - no exclusion criteria met  --    Spontaneous  mL  --    RSBI Calculated 116.28  --    Spontaneous Breathing Trial (SBT) Outcome  --  Oxygen saturation is less than 88% for 5 min or more - SBT failure   Patient Meet Extubation Criteria  --  No   Reasons Failed Extubation Criteria  --  Failed SAT and/or SBT     Pt initially placed on Cpap/ps 5/5, low tidal volumes and high RR, RSBI geather than >105. Increased to PS 8/5 tolerating better w/ higher tidal volumes.    0528 - RN told this RT that SpO2 dropping to 88%, pt switched back to AC/VC mode and increased FiO2 to 50%.         
  ICU Progress Note            Subjective:     5/13- several minutes of seizure activity on rapid. CT head negative for bleed. Seizure activity stopped after intubation / benzo / propofol  5/14: Off vasopressors since last night. Off sedation since 3 am. Not waking up. Not following commands.   5/15 - continues to be on the ventilator.  5/16 - failed SAT/SBT. OLGA worsening.     HPI:89 y/o male PMHx of Tonsillar cancer s/p chemo/radiation, chronic alcohol use,  R PCA stroke (residual ataxia), HTN, and HLD  admitted (5/11) by hospital medicine for sepsis secondary to acute gastroenteritis with proctitis after presenting with progressive weakness, lethargy and diarrhea x 1 week.  Cardiology consulted for elevated troponin and A-fib RVR on diltiazem infusion.  Rapid response evening 5/12 for upper extremity/facial twitching right facial droop, left gaze preference not crossing midline.  Per daughter facial droop/AMS had been occurring for over a day, rapid EEG applied +63 % seizure burden, administered Keppra and Ativan with seizure cessation.  Remained tachypneic, not protecting airway, patient's daughter updated at bedside advised okay with short-term intubation. ICU consulted     Assessment and Plan:    Respiratory insufficiency - intubated for airway protection. Continues to be obtunded. Cont. Mechanical ventilation. Daily SBT.    Seizures - MRI brain negative for ischemia/bleed. EEG with triphasic waves. Cont. Keppra. Appreciate neurology recommendations.     Proctitis - On Zosyn. ID following.     A.fib - Controlled. Cardiology following.     OLGA - cont. Close monitoring. Family has made it clear - no dialysis. Cont. Medical management.     Acute encephalopathy - likely metabolic. History of alcohol use, transaminitis, elevated creatinine.     Pancytopenia - likely bone marrow suppression from alcohol ?    5/16  - Daughter attended multi-disciplinary rounds. She is moving towards comfort measures.     5/15  - 
  ICU Progress Note            Subjective:     5/13- several minutes of seizure activity on rapid. CT head negative for bleed. Seizure activity stopped after intubation / benzo / propofol  5/14: Off vasopressors since last night. Off sedation since 3 am. Not waking up. Not following commands.   5/15 - continues to be on the ventilator.  5/16 - failed SAT/SBT. OLGA worsening.   5/17 - continues to be on the ventilator.     HPI:88 y/o male PMHx of Tonsillar cancer s/p chemo/radiation, chronic alcohol use,  R PCA stroke (residual ataxia), HTN, and HLD  admitted (5/11) by [facility] medicine for sepsis secondary to acute gastroenteritis with proctitis after presenting with progressive weakness, lethargy and diarrhea x 1 week.  Cardiology consulted for elevated troponin and A-fib RVR on diltiazem infusion.  Rapid response evening 5/12 for upper extremity/facial twitching right facial droop, left gaze preference not crossing midline.  Per daughter facial droop/AMS had been occurring for over a day, rapid EEG applied +63 % seizure burden, administered Keppra and Ativan with seizure cessation.  Remained tachypneic, not protecting airway, patient's daughter updated at bedside advised okay with short-term intubation. ICU consulted     Assessment and Plan:    Respiratory insufficiency - intubated for airway protection. Continues to be obtunded. Cont. Mechanical ventilation. Daily SBT.    Seizures - MRI brain negative for ischemia/bleed. EEG with triphasic waves. Cont. Keppra. Appreciate neurology recommendations.     Proctitis - On Zosyn. ID following.     A.fib - Controlled. Cardiology following.     OLGA/metabolic acidosis - cont. Close monitoring. Family has made it clear - no dialysis. I am in agreement. Cont. Medical management. Cont. Bicarb PO. Renal recommendations appreciated.     Acute encephalopathy - likely metabolic. History of alcohol use, transaminitis, elevated creatinine.     Pancytopenia - likely bone marrow 
  ICU Progress Note            Subjective:     5/13- several minutes of seizure activity on rapid. CT head negative for bleed. Seizure activity stopped after intubation / benzo / propofol  5/14: Off vasopressors since last night. Off sedation since 3 am. Not waking up. Not following commands.   5/15 - continues to be on the ventilator.  5/16 - failed SAT/SBT. OLGA worsening.   5/17 - continues to be on the ventilator.   5/18 - significant drop in Hb. Continues to be on the ventilator and not following commands.   5/19: Patient remains intubated and unresponsive being off sedation.    HPI:88 y/o male PMHx of Tonsillar cancer s/p chemo/radiation, chronic alcohol use,  R PCA stroke (residual ataxia), HTN, and HLD  admitted (5/11) by hospital medicine for sepsis secondary to acute gastroenteritis with proctitis after presenting with progressive weakness, lethargy and diarrhea x 1 week.  Cardiology consulted for elevated troponin and A-fib RVR on diltiazem infusion.  Rapid response evening 5/12 for upper extremity/facial twitching right facial droop, left gaze preference not crossing midline.  Per daughter facial droop/AMS had been occurring for over a day, rapid EEG applied +63 % seizure burden, administered Keppra and Ativan with seizure cessation.  Remained tachypneic, not protecting airway, patient's daughter updated at bedside advised okay with short-term intubation. ICU consulted     Assessment and Plan:    Respiratory insufficiency - intubated for airway protection. Continues to be obtunded. Cont. Mechanical ventilation. Daily SBT.    Seizures - MRI brain negative for ischemia/bleed. EEG with triphasic waves. Cont. Keppra. Appreciate neurology recommendations.     Proctitis - On Zosyn. ID following.     A.fib - Controlled. Cardiology following.     OLGA/metabolic acidosis - cont. Close monitoring. Family has made it clear - no dialysis. I am in agreement. Cont. Medical management. Cont. Bicarb PO. Bumex 3 mg BID. 
  Physician Progress Note      PATIENT:               DALTON DUARTE  CSN #:                  [id number]  :                       1935  ADMIT DATE:       2025 4:22 AM  DISCH DATE:  RESPONDING  PROVIDER #:        Yael Marley MD          QUERY TEXT:    Sepsis is documented on 2025 in the progress notes.    Based on your medical judgment, please clarify the POA status at the time of   the order to admit the patient to inpatient status:    The clinical indicators include:  -Pt transferred from OSH -Admit time of 2025- @ 0422 due to diarrhea   illness.  -On 2025 @ 1226 Rectal temp @ 101.1- and from the time of admission,   documented resp rate in the 30-40s, HR in the 90s-  -On 2025 Attending notes \"Sepsis\" acute gastroenteritis with proctitis  -Per labs on 2025 @ 12: 01- Lactic acid @ 2.0- , on 2025, 2.2, and   on  @ 8am Lactic acid @ 4.0  Options provided:  -- Present on admission  -- Not present on admission  -- Other - I will add my own diagnosis  -- Disagree - Not applicable / Not valid  -- Disagree - Clinically unable to determine / Unknown  -- Refer to Clinical Documentation Reviewer    PROVIDER RESPONSE TEXT:    The diagnosis was present on admission.    Query created by: Payal Maurer on 2025 1:38 PM      Electronically signed by:  Yael Marley MD 2025 1:53 PM          
  Physician Progress Note      PATIENT:               DALTON DUARTE  CSN #:                  [id number]  :                       1935  ADMIT DATE:       2025 4:22 AM  DISCH DATE:  RESPONDING  PROVIDER #:        Chata Dukes MD          QUERY TEXT:    Encephalopathy is documented in the medical record H&P  Please specify type:    The clinical indicators include:  -Sepsis is documented in the 2025  -- ID notes, \"Septic Shock,  Hypovolemic shock.  -CT of head negative to acute process in the brain.  -Per H&P pt progressively worsening weakness, lethargy,. Pt is withdrawn not   interacting on exam.  Options provided:  -- Metabolic  -- Septic  -- Other - I will add my own diagnosis  -- Disagree - Not applicable / Not valid  -- Disagree - Clinically unable to determine / Unknown  -- Refer to Clinical Documentation Reviewer    PROVIDER RESPONSE TEXT:    The patient has metabolic encephalopathy.    Query created by: Payal Maurer on 2025 1:52 PM      QUERY TEXT:    Right PCA CVA with residual ataxia. is documented in the medical record.    Please provide additional clinical indicators to support this diagnosis on   current admission or clarify current status.    The clinical indicators include:  - H&P has \"History of Right PCA CVA with residual ataxia\"  - On - Attending progress notes has dropped the \"History of\", and has   Right PCA CVA with residual ataxia  -On Admission CT of head without acute findings.  Options provided:  -- No clinical evidence of an Acute Right PCA CVA currently. The Right PCA CVA   is a PMH only.  -- Other - I will add my own diagnosis  -- Disagree - Not applicable / Not valid  -- Disagree - Clinically unable to determine / Unknown  -- Refer to Clinical Documentation Reviewer    PROVIDER RESPONSE TEXT:    This patient does not have clinical evidence of Acute Right PCA CVA currently.   This is PMH only.    Query created by: Payal Maurre on 2025 1:59 
 Neurology Note    Patient ID:  Aubrey Bell III  [id number]  89 y.o.  7/14/1935      Date of Consultation:  May 14, 2025        Assessment and Plan:    The patient is a 89-year-old male with multiple medical conditions who was transferred from an outside hospital due to progressive weakness, lethargy and diarrhea.  Patient did have new onset seizure overnight.  Patient is currently intubated in the intensive care unit      New onset seizure:  Differential includes systemic impact of ongoing infection, metabolic derangements  Brain mri with no evidence of an acute stroke.  Atrophy, chronic microvascular ischemic disease and old stroke noted. Can be a seizure focus.  Head CT with chronic microvascular ischemic disease  CTA with mild carotid stenosis and intracranial stenosis  Continue Keppra 750 mg twice a day  Rapid EEG with initial seizure activity but no seizures for the remainder of the study  full channel EEG with no subclinical seizures  Continue 81 mg aspirin  Continue correcting metabolic derangements  Optimize nutritional status  Monitor for signs and symptoms of alcohol withdrawal    Afib  Acute kidney injury  History of tonsillar cancer  Thrombocytopenia  Acute respiratory failure  Elevated lft  Sepsis - ID  following      Neurology will continue to follow closely in this complicated patient with ongoing neurological symptoms necessitating additional testing and follow up on neurological examination.           Subjective: intubated,        History of Present Illness:   Aubrey Bell III is a 89 y.o. male who presents as a transfer from [facility] due to progressive generalized weakness lethargy and diarrhea.  He had become unable to walk around his house.    He was initially admitted to the hospital service with an acute kidney injury.  He was seen by cardiology due to an elevated troponin.  During the hospitalization, the patient did go into atrial fibrillation with RVR.  The patient 
 Neurology Note    Patient ID:  Aubrey Bell III  [id number]  89 y.o.  7/14/1935      Date of Consultation:  May 15, 2025        Assessment and Plan:    The patient is a 89-year-old male with multiple medical conditions who was transferred from an outside hospital due to progressive weakness, lethargy and diarrhea.  Patient did have new onset seizure overnight.  Patient is currently intubated in the intensive care unit  A bit more responsive today    New onset seizure:  Differential includes systemic impact of infection, metabolic derangements, history of prior stroke  Brain mri with no evidence of an acute stroke.  Atrophy, chronic microvascular ischemic disease and old stroke noted. Can be a seizure focus.  Head CT with chronic microvascular ischemic disease  CTA with mild carotid stenosis and intracranial stenosis  Continue Keppra 750 mg twice a day  Rapid EEG with initial seizure activity but no seizures for the remainder of the study  full channel EEG x 2 with no subclinical seizures  Continue 81 mg aspirin  Continue correcting metabolic derangements  Optimize nutritional status      Encephalopathy:  No evidence of subclinical seizures or new stroke  Most likely multifactorial and associated with metabolic derangements (worsening liver and renal function), medication, decreased nutritional status  Brain MRI revealed significant atrophy suggesting decreased cognitive reserve  Minimize cognitively impacting medication  Correct metabolic derangements  Optimize nutritional status      Afib  Acute kidney injury  History of tonsillar cancer  Thrombocytopenia  Acute respiratory failure  Elevated lft  Sepsis/proctitis     There is no other additional neurology recommendations at this time.  If questions arise, please not hesitate to contact me and I will return to see the patient.  Care plan discussed with Dr. Rosario and the patient's daughter           Subjective: intubated,        History of Present Illness:   Aubrey 
0700  Report received from Ariana GARY RN    0730  BM noted and cleaned    0800  Assessment complete  Patient will open eyes to voice and responds to pain no command following no tracking cough and gag are present On ventilator no sedation  Swelling noted in upper ext's Sharpe in place with little output  Right upper arm PICC double lumen in place with KVO  NSR on monitor  OGT in place with Jevity at 40 and water flushes 150 every 4 hours  Restraints in place    0900  Daughter at bedside    1200  Assessment complete no change  BP soft at times but stable     1300  Albumin given per order  order for Bumex received BP soft will wait until BP is better    1312  Urine sent per orders    1322  BP better  Bumex given see MAR    1600  Assessment complete no change    1800  Urine output is getting better see flow sheet   BP much better after Bumex    1900  Report given to Gricelda MAYS and Umesh MAYS  
0700  Report received from Gricelda MAYS and Umesh MAYS    0766  Glenny on phone speaking with Dr Rosario will hold off on blood transfusion for now Family will come in today to discuss more on comfort care measures    0800  Assessment complete  Patient intubated on vent pressure support 10/5 40% does become tachypnea at times  NSR on monitor  patient will open eyes to voice no tracking no following no command following able to move lower ext's not upper swelling noted in all 4 plus scrotum edema noted  Sharpe in place  Right upper arm PICC double lumen with KVO  OGT in place with Jevity 45 at goal and water flushed 150 every 4 hours    1030  Patient's son at bedside    1200  Assessment complete no change    1330  Patient's grand son at bedside    1600  Assessment complete  BP on the soft side    1630  Spoke with Dr Rosaroi will order Albumin 500 ml and then will place an order for Levo in needed    1645  Daughter of patient Glenny called for up dates.  She and her mother are unable to get here today will be in tomorrow (5/19) early to see patient and discuss goals of care.  Blood consent was still not obtained they will think about it for tomorrow.    1730  BP better after Albumin holding Levo    1904  Patient having increased RR and coughing with vent after suctioning patient no improvement medicated for pain    1906  Report given to Ethel MAYS              
0700  Report received from Gricelda MAYS and Umesh MAYS    0800  Assessment completed Patient is intubated will open eyes to voice does not follow commands does not track or focus will move feet to stimuli does not move upper ext's to any stimuli  Restraints in place  OGT in place with TF at goal and water flushes scheduled Sharpe in place with yellow urien  Right upper arm PICC double lumen in place with a KVO  NSR on monitor   Pressure support 8/5 40% Tachypnea noted at times sat's mid 90's    1200  Assessment complete   Bath completed after bath and turn patient went into A-fib rate control max 120  Family at bedside    1245  Dr Rosario in speaking with family I did notified him about the A-fib will continue to monitor    1500  Family left will return tomorrow    1555  Respiratory changed vent setting back to AC/VC 40% 430 peep 5 rate 12    1600  Assessment complete  Patient converted back to NSR 80's  BP stable Respiratory rate better see flowsheet    1900  Report given to Gricelda MAYS and Umesh MAYS          
0720-Bedside shift change report given to TABATHA Dent (oncoming nurse) by TABATHA Lane (offgoing nurse). Report included the following information SBAR, Procedure Summary, Intake/Output, MAR, Recent Results, and Cardiac Rhythm -NSR . Monitor alarms verified.    0800-Shift assessment complete-see flowsheets for detailed findings. Pt. remains intubated without sedation.     1020-Interdisciplinary team rounds were held 5/15/2025 with the following team members: Physician, Nursing, Dietitian, Pharmacist, , CCU CCL, and the patient's daughter, Glenny.    Plan of care discussed. See clinical pathway and/or care plan for interventions and desired outcomes.    Goals of the Day: Continue with florez catheter for urinary retention and PICC line for pressor use.    1200-Reassessment complete-no changes noted.    1600-Reassessment complete-no changes noted.    1920-Bedside shift change report given to TABATHA Lane (oncoming nurse) by TABATHA Dent (offgoing nurse). Report included the following information SBAR, Intake/Output, MAR, Recent Results, and Cardiac Rhythm -NSR .  
0805: Chemistry abnormally resulted overnight; repletion was ordered. MD concerned about results; sending another CMP and LA. Holding K repletion and Bicarb gtt for now, pending results.    0840: Neuro at bedside; discussed plan for EEG. Reportedly had 10 minutes of seizure shown on previous rapid EEG.    0930: Vascular access has been called r/t need for PICC with Levophed infusion; pending call back.    0935: NS bolus 500ml started.     1045: Wife at bedside; updated.    1105: Procalcitonin lab sent.    1224: PICC RUE brachial inserted. US at bedside.    1228: Spoke with MRI; plan for MRI at 1430.    1240: Vasopressin started. Plan to wean Levophed as able.    1250: Echo at bedside.    1415: EEG at bedside plan to setup after MRI scheduled for 1445. Will call them upon return.    1430: Unable to transport to MRI until MRI compatible transport ventilator is available; notified MRI. D/w RT.    1515: Pt had large dark watery BM. Cleaned; bed, pad, gown changed; bathed.    1640: Pt back in room. MRI brain completed. EEG setting up at bedside. Due to limit of 2 channels, Vaso and Levo infused during MRI; Amiodarone and Propofol were held during procedure. Pt has some minimal spontaneous eye opening; not following commands or interactive. Remains in NSR on monitor HR 60-70s.    1700: Serum labs & UA with reflex Culture sent.    1748: BP stable on Vaso at 0.04 & Levo at 4. D/w MD. Vasopressin gtt stopped.    1824: Started D5LR at 75ml/hr.          
1142: Pt became tachypneic. Vent settings changed from PS to AC/VC.    1235: Tylenol given for fever; temp 100.5.    1330: EEG setting up at bedside. Pt has not woken up; has been off sedation since 0418 this morning.      
1900 Bedside and Verbal shift change report given to Umesh GARY RN and Gricelda TOLENTINO RN (oncoming nurse) by Richie HARLEY RN (offgoing nurse). Report included the following information Nurse Handoff Report, Intake/Output, MAR, Recent Results, Cardiac Rhythm SR, and Neuro Assessment.      2000 Physical assessment completed. See flowsheets.     0000 Re-assessment completed. No changes noted.     0400 Re-assessment completed. No changes noted.     0520 Tube feed stopped for levothyroxine administration. Refer to MAR.    0612 Hermes Ward, DAVID notified of Hgb drop from 10.9 yesterday 5/16 to 7.2 this AM. No new orders received at this time.     0700 Bedside shift report given to Richie HARLEY RN.  
1900 Bedside and Verbal shift change report given to Umesh GARY RN and Gricelda TOLENTINO RN (oncoming nurse) by Richie HARLEY RN (offgoing nurse). Report included the following information Nurse Handoff Report, Intake/Output, MAR, Recent Results, and Neuro Assessment, and Cardiac Rhythm. Safety precautions in place.    2000 Physical assessment completed. See flowsheets.    2150 Renay Vigil, DAVID notified of patient's decreased urine output over the last few hours. Crackles auscultated in both left and right lower lobes. Patient remains on the vent at 40% FiO2. No worsening hypoxia. Continue to closely monitor. No new orders at this time.     0000 Re-assessment completed. No changes noted.     0400 Re- assessment completed. No changes noted.       0700 Bedside shift report given to Richie HARLEY RN.  
1900h- Bedside and Verbal shift change report given by Richie MAYS (offgoing nurse). Report included the following information Nurse Handoff Report, Adult Overview, Intake/Output, MAR, Recent Results, Cardiac Rhythm Sinus rhythm, Alarm Parameters, Quality Measures, Neuro Assessment, and Event Log.     - pt. Intubated on spontaneous mode with FiO2- 40%, both pupils reactive to light, eye open spontaneously, does not track and no eye contact. Withdraws to pain, not following commands. Both extremities edematous and scrotal edema. With pressure injury see LDA; applied venelex cream. Lip sore noted.    - Hgb- 4.9 since morning; blood transfusion still on hold, pt. Family not consented yet.    2000h- shift assessment done; see flowsheet.     2022h- back to ACVC mode on vent. done by RT.    2230h- picture taken on sacrum area for update, including lip sore noted on left lip.    0000h- reassessment done; no changed.  - blood sugar checked 265 mg/dl; 2 units insulin lispro SubQ given.    0330h- blood drawn for routine labs.    0350h- critical lab. Results Hgb- 3.6 Hct- 10.7; NP Renay BROWN Notified.    0400h- reassessment done; no changed.    0431h- shifted to spontaneous mode on vent. by RT.    0528h- pt. Desated 88-89%; shifted back to ACVC mode on vent. by RT.    0600h- Sat's 93-94%.    0657h- called dietary for the Jevity of pt.    0700h- Bedside and Verbal shift change report given to Bharat MAYS (oncoming nurse) by Garcia MAYS (offgoing nurse). Report included the following information Nurse Handoff Report, Adult Overview, Intake/Output, MAR, Recent Results, Cardiac Rhythm Sinus rhythm, Alarm Parameters, Quality Measures, and Neuro Assessment.     
1908-Bedside shift change report given to TABATHA Lane (oncoming nurse) by TABATHA Nicholson (offgoing nurse). Report included the following information Nurse Handoff Report, Cardiac Rhythm SR, and Event Log.     1945-Assessments and vitals noted in flowsheets.     0000-Reassessments complete, see flowsheets.     0400-Placed propofol on hold for SBT.    0411-Brianna,RT placed on Cpap trial, see flowsheets for settings.     0525-Propofol remains on hold, withdraws to pain, not following commands, tolerating SBT well.     0710-Bedside shift change report given to TABATHA Nicholson (oncoming nurse) by TABATHA Lane (offgoing nurse). Report included the following information Nurse Handoff Report and Event Log.             
1910-Bedside shift change report given to TABATHA Lane (oncoming nurse) by TABATHA Nicholson (offgoing nurse). Report included the following information Nurse Handoff Report, Cardiac Rhythm SR, and Event Log.     1930-Assessment and vitals noted, mouth care provided, turned and repositioned with pillows, grimaces/withdraws from pain, not following commands, sedation remains off.     2300-CHG bath done, tolerated fair, spontaneously opens eyes, does not focus or track, sedation remains off.     0000-Reassessments noted in flowsheets.    0308-Tylenol prn given for temp 101.2.    0402-Temp 100.7, placed on  Cpap trial by Brianna,RT.    0534-Tachypneic, respirations mid to upper 30's, prn fentanyl given, remains on Cpap, tolerating fair, SR 70's, spo2 95%, pulling adequate tidal volumes.    0705-Bedside shift change report given to TABATHA Dent (oncoming nurse) by TABATHA Lane (offgoing nurse). Report included the following information Nurse Handoff Report and Event Log.                                
1915-Bedside shift change report given to TABATHA Lane (oncoming nurse) by TABATHA Dent (offgoing nurse). Report included the following information Nurse Handoff Report and Event Log.    1930-Assessments and vitals noted, see flowsheets.    2330-Reassessments and vitals noted, see flowsheets.    0330-Reassessments and vitals noted, did not tolerate SBT, Respirations up to 40's, st 110's per CM, placed back on previous settings.    0410-TF placed on hold per order.    0520-BP 77/38, albumin started per MD order .     0645-Bp improved, see flowsheets.    0730-Bedside shift change report given to TABATHA Quiroz (oncoming nurse) by TABATHA Lane (offgoing nurse). Report included the following information Nurse Handoff Report and Event Log.                      
2234: Pt transferred to unit with Charge Nurse, primary RN, RT, and DAVID Ward at bedside. Pt was rapid response for seizure activity from [facility]. Pt transferred to room 2510. Report received at bedside from TABATHA Zuniga in SBAR format with all questions and concerns addressed.     2235: Pt assessment completed, findings  documented, pt repositioned. Pt intubated on 100% FiO2 PEEP: 6, Levo @ 40 mcg/min per NP. OG tube placed and verified with x-ray, propofol gtt initiated for sedation. New PIV inserted for CT scan. Pt escorted to CT with this RN, Charge Nurse and RT at bedside.     2300: Amiodarone bolus given (see MAR).    2320: CT complete and pt escorted back to the unit.     2339: NS bolus initiated (see MAR).     2345: Daughter at bedside and given update.     2350: Amiodarone gtt initiate (see MAR).    0000: Reassessed pt, findings documented, pt repositioned.     [id number]: Hehipolito, NP @ bedside giving verbal orders to titrate norepinephrine by 5 mcg/min.    0134: Amiodarone bolus given (see MAR).    [id number]: Heavenrosmar, NP at bedside giving verbal orders to titrate norepinephrine by 5 mcg/min.    0209: NS bolus initiated (See MAR).    0400: Reassessed pt, findings documented, pt repositioned.     0440: Contacted [facility] for pt referral. Spoke with Amarilis.     0545: Contacted by Amarilis from [facility] will not be following at this time, advised to update [facility] if pt under goes brain death testing or TOD.     0700: Report given at bedside to TABATHA Nicholson in SBAR format with all questions and concerns addressed.   
Bedside shift change report given to Bharat RN (oncoming nurse) by Ethel MAYS (offgoing nurse). Report included the following information Nurse Handoff Report.     1131: Called [facility] to notify of plans for CMO and extubation. Instructed to call back with TOD.    1154: Pt given Valium and Morphine. Has grimacing.    1158: Pt extubated. Wife at bedside; daughter exited during extubation and returned. Pt appears calm, has elevated RR 25-35.    1211: Pt grimacing, RR 30s using accessory muscles. Morphine given.     1231: Pt still has great WOB; NC 2L placed on pt. Morphine given. Pt has audible secretions, Robinul given.    1250: Pt respiratory status remains the same. Pt family in room remarking his presentation has not improved. Morphine given.    1306: Valium and Morphine given. Pt RR 30s, still has some audible secretions.    1323: TOD declared by MD. Pt has no cardiac activity on monitor or auscultation.      
Blood consent note (attempt):    I called to request blood consent for acute, life threatening anemia.  I spoke to Glenny Cummins.  I explained the need for transfusion, risk/benefits.  She explained she was not sure if the patient would be willing to take a blood transfusion. She said that she needed to speak with her mom about this.  I did express that she needed to make the decision in an expedited fashion, but that she should speak with her mom.  Glenny explained they were considering comfort measures and were determining how to proceed.    Plan: Blood is ordered with type and cross, but the order is now on hold until consent is obtained.    Renay Vigil NP  
Cardiology Progress Note      5/12/2025 11:19 AM    Admit Date: 5/11/2025          Subjective: Developed afib overnight. Remains in afib with controlled rate on IV diltiazem Still very weak and poorly responsive          BP 99/61   Pulse 97   Temp (!) 100.9 °F (38.3 °C) (Axillary)   Resp 30   Ht 1.75 m (5' 8.9\")   Wt 56.9 kg (125 lb 7.1 oz)   SpO2 97%   BMI 18.58 kg/m²   05/10 1901 - 05/12 0700  In: 0   Out: 1200 [Urine:1200]        Objective:      Physical Exam:  VS as above  Chest CTA  Card Irreg irreg no gallop     Data Review:   Labs:    BUN 40  Creat 1.3   Trop 856  Hgb 14.8  Plat 67K     Telemetry: afib R 80-90       Assessment:       1. Mild troponin elevation, likely type 2 due to underlying noncardiac illness.  2. Nausea, vomiting, and diarrhea with fever presumed of infectious etiology.  3. Dyslipidemia.  4. Remote history of a stroke.  5. 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  6. Mild hypertension.  7. Thrombocytopenia   8. Afib with RVR        Plan:  Cont IV dilt. Echo Would expect him to convert back to SR at some point. Cont IV Abx , aggressive hydration for GI illness              
Cardiology Progress Note      5/13/2025 8:42 AM    Admit Date: 5/11/2025          Subjective:  Events noted. Currently on IV amiodarone, back in SR. On vent support and pressors          BP (!) 112/47   Pulse 72   Temp 98.3 °F (36.8 °C) (Axillary)   Resp 25   Ht 1.75 m (5' 8.9\")   Wt 66.4 kg (146 lb 6.2 oz)   SpO2 98%   BMI 21.68 kg/m²   05/11 1901 - 05/13 0700  In: 7497.9 [I.V.:3489.9]  Out: 1725 [Urine:1725]        Objective:      Physical Exam:  VS as above  Chest coarse BS  Card RRR no gallop Dist ht sounds     Data Review:   Labs:      7.38/28/252  Trop 272  BUN 31  Creat 1.0  Hgb 11.8       Telemetry: SR R 77       Assessment:       1. Mild troponin elevation, likely type 2 due to underlying noncardiac illness.  2. Nausea, vomiting, and diarrhea with fever presumed of infectious etiology.  3. Dyslipidemia.  4. Remote history of a stroke.  5. 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  6. Mild hypertension.  7. Thrombocytopenia   8. Afib with RVR   9. Acute resp failure  10. Septic shock   11. Seizure   12. Severe hypokalemia        Plan: Cont current supportive Rx.  Echo pending              
Cardiology Progress Note      5/14/2025 8:27 AM    Admit Date: 5/11/2025          Subjective:  No afib. Remains on vent Echo showed EK 55-60% , valves ok          BP (!) 123/56   Pulse 71   Temp 99.5 °F (37.5 °C) (Axillary)   Resp 21   Ht 1.75 m (5' 8.9\")   Wt 66.4 kg (146 lb 6.2 oz)   SpO2 97%   BMI 21.68 kg/m²   05/12 1901 - 05/14 0700  In: 76655.6 [I.V.:4834.5]  Out: 1225 [Urine:1225]        Objective:      Physical Exam:  VS as above  Chest coarse BS bilat  Card RRR no gallop     Data Review:   Labs:    BUN 48  Creat 2.2   Plat 61K  Alb 2.1     Telemetry: SR       Assessment:     1. Mild troponin elevation, likely type 2 due to underlying noncardiac illness. Nl LVEF by echo   2. Nausea, vomiting, and diarrhea with fever presumed of infectious etiology.  3. Dyslipidemia.  4. Remote history of a stroke.  5. 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  6. Hx of Mild hypertension.  7. Thrombocytopenia   8. Afib with RVR   9. Acute resp failure  10. Septic shock   11. Seizure   12. OLGA       Plan:   Cont IV amiodarone and supportive Rx. Nothing to add today              
Cardiology Progress Note      5/15/2025 8:49 AM    Admit Date: 5/11/2025          Subjective:  Remains on vent but unresponsive. Now DNR Off IV amio          BP (!) 105/51   Pulse 71   Temp 98.9 °F (37.2 °C) (Axillary)   Resp 21   Ht 1.753 m (5' 9\")   Wt 66.4 kg (146 lb 6.2 oz)   SpO2 98%   BMI 21.62 kg/m²   05/13 1901 - 05/15 0700  In: 5023.7 [I.V.:3237.2]  Out: 925 [Urine:925]        Objective:      Physical Exam:  VS as above  Chest coarse BS  Card RRR no changes     Data Review:   Labs:    BUN 64  Creat 2.65  Hgb 10.5  Plat 60K      Telemetry: SR       Assessment:          1. Mild troponin elevation, likely type 2 due to underlying noncardiac illness. Nl LVEF by echo   2. Nausea, vomiting, and diarrhea with fever presumed of infectious etiology.  3. Dyslipidemia.  4. Remote history of a stroke.  5. 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  6. Hx of Mild hypertension.  7. Thrombocytopenia   8. Afib with RVR   9. Acute resp failure  10. Septic shock   11. Seizure   12. OLGA     Plan:   Cont supportive Rx. Nothing to add. Prognosis appears poor              
Cardiology Progress Note      5/16/2025 11:19 AM    Admit Date: 5/11/2025          Subjective:  No major changes overnight. Staying in SR          BP (!) 94/52   Pulse 76   Temp 99.3 °F (37.4 °C) (Axillary)   Resp 18   Ht 1.753 m (5' 9\")   Wt 66.4 kg (146 lb 6.2 oz)   SpO2 96%   BMI 21.62 kg/m²   05/14 1901 - 05/16 0700  In: 5055.9 [I.V.:1803.2]  Out: 940 [Urine:940]        Objective:      Physical Exam:  VS as above  Chest CTA  Card RRR no gallop     Data Review:   Labs:    BUN 93  Creat 3.5  Hgb 10.9    Telemetry: SR       Assessment:     1. Mild troponin elevation, likely type 2 due to underlying noncardiac illness. Nl LVEF by echo   2. Nausea, vomiting, and diarrhea with fever presumed of infectious etiology.  3. Dyslipidemia.  4. Remote history of a stroke.  5. 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  6. Hx of Mild hypertension.  7. Thrombocytopenia   8. Afib with RVR   9. Acute resp failure  10. Septic shock   11. Seizure   12. OLGA      Plan:   Cont current supportive Rx. Will see over weekend on request              
Chart reviewed. Pt remains medically inappropriate for participation in PT evaluation as pt is intubated. Pt not sedated but does not follow commands and is not interactive. Please reconsult if pt becomes more appropriate for participation with skilled intervention.     Mattie Lee, PT, DPT  
Comprehensive Nutrition Assessment    Type and Reason for Visit:  Initial, Consult    Nutrition Recommendations/Plan:   Initiate TF via OGT:  Start Jevity 1.5 @ 10mL/h, advance rate 10mL q 12h as tolerated to Goal of 45mL/h + Prosource daily + Royce BID + 150mL flush q 4h (provides 1870kcals/89gPro/232gCHO/1720mL)   Agree with high dose thiamine supplementation and folate  Monitor lytes for refeeding     Malnutrition Assessment:  Malnutrition Status:  Moderate malnutrition (05/14/25 1243)    Context:  Acute Illness     Findings of the 6 clinical characteristics of malnutrition:  Energy Intake:  50% or less of estimated energy requirements for 5 or more days  Weight Loss:  Unable to assess     Body Fat Loss:  No body fat loss     Muscle Mass Loss:  Mild muscle mass loss Thigh (quadriceps), Clavicles (pectoralis & deltoids)  Fluid Accumulation:  No fluid accumulation     Strength:  Not Performed    Nutrition Assessment:  Pt admitted with OLGA and FTT.  Consult received, chart reviewed and case discussed during CCU rounds.  Pt intubated.  Wife at the bedside holding his hand.  Pt was on high dose pressors yesterday but is off of them today.  OGT in place.  Per IDR discussion okay to start TF, see orders above.  Pt with diarrhea and minimal intake x 1 week per wife.  She reports his appetite is chronically poor due to lack of taste following tx for his tonsillar cancer years ago.  He typically does not want dinner but will eat if she makes him something.  She is unsure of wt loss but cites a UBW of 145lb but feels he has lost some muscle in his legs following this past week of very poor intake.  Mild quad and deltoid wasting noted per limited NFPE.  She reports his ETOH intake is limited to 1 shot of liqueur per drink with club soda and he has not had one in the last week.  Added high dose thiamine and folate given acute malnutrition and regular ETOH intake + AMS this morning off of sedation.  Na 145, will give 
Comprehensive Nutrition Assessment    Type and Reason for Visit:  Reassess    Nutrition Recommendations/Plan:   Continue advancing TF to goal rate as ordered   Continue flushes for now, can back down if Na drops and diuresis stalls      Malnutrition Assessment:  Malnutrition Status:  Moderate malnutrition (05/14/25 1243)    Context:  Acute Illness     Findings of the 6 clinical characteristics of malnutrition:  Energy Intake:  50% or less of estimated energy requirements for 5 or more days  Weight Loss:  Unable to assess     Body Fat Loss:  No body fat loss     Muscle Mass Loss:  Mild muscle mass loss Thigh (quadriceps), Clavicles (pectoralis & deltoids)  Fluid Accumulation:  No fluid accumulation     Strength:  Not Performed    Nutrition Assessment:  Chart reviewed, case discussed during CCU rounds.  Pt remains intubated, needs re-estimated.  TF at 40mL/h early this morning, 2h hold for synthroid (resumed earlier this week).  TF will meet goal later today.  BM noted today.  Nephrology consulted for OLGA, plan is to diurese.  Family clear that they do not want to start dialysis.  Na 145, will continue flushes as is for now and monitor.  K 3.7 and phos 2.5.  BUN/creat steadily rising, will monitor POC.  -173.  Family is considering comfort measures but no decisions made yet.        Nutrition Related Findings:    Meds: folvite, zosyn, thiamine, Kphos, bumex, lispro, synthroid.    Edema: +1-generalized.    BM: 5/16   Wound Type: Deep Tissue Injury (coccyx)       Current Nutrition Intake & Therapies:    Average Meal Intake: NPO     Current Tube Feeding (TF) Orders:  Feeding Route: Orogastric  Formula: Standard with Fiber  Schedule: Continuous  Feeding Regimen: 40mL/h  Additives/Modulars: Protein, Wound Healing (Prosource daily + Royce BID)  Water Flushes: 150mL q 4h  Current TF Provides: at goal with 2h hold provides 1735kcals/83gPro/213gCHO/1652mL    Anthropometric Measures:  Height: 175.3 cm (5' 9\")  Ideal 
Critical care interdisciplinary rounds today.  Following members present: Case Management, , Clinical Lead, Diabetes Team, Nursing, Nutrition, Pharmacy, and Physician. Family invited to participate.  Plan of care discussed.  See clinical pathway for plan of care and interventions and desired outcomes.     PICC for pressors.  [place]pe for retention  
D/w Dr Rosario   Awaiting family to move forward w/ comfort measures   Signing off.   Thank you for allowing us to participate in this patient's care.    
End of Shift Note    Bedside shift change report given to  (oncoming nurse) by Ginger Gómez RN (offgoing nurse).  Report included the following information SBAR and Recent Results    Shift worked:  5670-7325     Shift summary and any significant changes:     Pt developed Afib RVR at 0250. EKG performed and confirmed rhythm. Messaged on call MD. Called rapid nurse. Reached out to attending in ED who admitted pt. AM labs obtained. Dr. Grier placed  order for cardizem drip.PT responded to cardizem and HR has been 103-110. Pt's daughter called and was informed of the changes.       Concerns for physician to address:  See above     Zone phone for oncoming shift:   [phone]       Activity:  Level of Assistance: Dependent, patient does less than 25%  Number times ambulated in hallways past shift: 0  Number of times OOB to chair past shift: 0    Cardiac:   Cardiac Monitoring: Yes      Cardiac Rhythm: A fib RVR    Access:  Current line(s): PIV     Genitourinary:   Urinary Status: Voiding, External catheter    Respiratory:   O2 Device: None (Room air)  Chronic home O2 use?: NO  Incentive spirometer at bedside: NO    GI:  Last BM (including prior to admit): 05/11/25  Current diet:  No diet orders on file  Passing flatus: YES    Pain Management:   Patient states pain is manageable on current regimen: YES    Skin:  Jeff Scale Score: 13  Interventions: Wound Offloading (Prevention Methods): Bed, pressure reduction mattress, Elevate heels, Pillows, Repositioning, Turning    Patient Safety:  Fall Risk: Nursing Judgement-Fall Risk High(Add Comments): Yes  Fall Risk Interventions  Nursing Judgement-Fall Risk High(Add Comments): Yes  Toilet Every 2 Hours-In Advance of Need: Yes  Hourly Visual Checks: In bed, Eyes closed, Quiet  Fall Visual Posted: Armband, Fall sign posted, Socks  Room Door Open: Deferred to promote rest  Alarm On: Bed, Chair  Patient Moved Closer to Nursing Station: No    Active Consults:   IP CONSULT TO 
End of Shift Note    Bedside shift change report given to RN (oncoming nurse) by MAVERICK HERNANDEZ RN (offgoing nurse).  Report included the following information SBAR, Kardex, Procedure Summary, Intake/Output, MAR, and Recent Results    Shift worked: 7-7pm     Shift summary and any significant changes:     Pt is alert, responding to voice and following with his eyes. No verbal response. Febrile during the shift, on room air.  Continue IV fluids.  No diarrhea during the shift.     Concerns for physician to address:       Zone phone for oncoming shift:            MAVERICK HERNANDEZ RN          
Infectious Disease Progress      Impression    Sepsis  Fever Tmax 101.1  Currently 100.5    Septic shock, hypovolemic shock  Off pressors    Acute diarrhea  Proctitis  Air-fluid level in sigmoid  H/o diarrhea, weakness, lethargy x 1 week  CT abdomen/pelvis-rapid bowel transit in colon, proctitis  Air-fluid level in sigmoid, diffuse thickening of rectum.    US abdomen + for mild gallbladder wall thickening  Mild amount of pericholecystic fluid.  No gallstone, CBD 6 mm    Acute hypoxic respiratory failure  S/p intubation  CXR-clear lungs    OLGA  Worsening  Cr 2.16      Severe hypokalemia  Improving      Seizures  On Keppra    A-fib with RVR  On amiodarone    History of tonsillar cancer  S/p chemotherapy RT    H/o chronic alcohol use    Thrombocytopenia  Platelets 61    Plan    Continue Zosyn IV  DC vancomycin  Fluids, pressors as needed per ICU team  ID service to follow      Abx  Cipro-5/11, 5/12  Flagyl 5/11 5/12  Zosyn 5/13  Vancomycin 5/13    Extensive review of chart notes, labs, imaging, cultures done  Additionally review of done: Recent reports-Labs, cultures, imaging  D/w -hospitalist, RN    Aubrey ARLEHT Bell III is seen for persistent fever, sepsis, acute gastroenteritis, proctitis  Aubrey Bell is an 89 y.o.  male with PMHx significant for tonsillar cancer s/p chemotherapy and radiation, chronic alcohol use, right PCA stroke with residual ataxia hypertension and HLD as transfer from [facility] after second presentation in 1 week with concerns for progressively worsening weakness, lethargy, diarrheal illness.  Now reportedly so deconditioned he is unable to walk and family was reportedly dragging him around the house on top of the sheet prior to calling EMS.  Patient was very withdrawn  & was not  interacting or answering questions.  Patient was initially admitted to hospitalist service.  Diagnosis of sepsis secondary to acute gastroenteritis with proctitis  Thereafter transferred to ICU.  
Occupational Therapy  Medical record reviewed.  Pt continues to be intubated, but is off sedation and is not responsive (no command following).  Will sign off at this time, but if pt's condition changes, please reconsult OT.  Thank you.  
Occupational Therapy  Orders received.  Pt had RRT due to seizures, and is now intubated and sedated.  Will defer and continue to follow as appropriate.    
Overnight admission.  Briefly patient is here with acute encephalopathy, prerenal OLGA, diarrhea, elevated troponin, elevated CK, bicytopenia, history of right PCA stroke with residual ataxia, HTN, HLD, debility, history of tonsillar cancer status post chemotherapy and radiation, chemotherapy-induced peripheral neuropathy, history of alcohol use disorder.  Vital signs are stable this morning.  CT head negative for any acute process.  CT chest abdomen pelvis were done and shows proctitis.  Troponin trend 31, 166, 177, 651, 739.  Lactic acid of 2.0.  Will trend troponin every 6 hours.  Awaiting for input from cardiology.  Will also get echocardiogram to assess heart function.  Platelet is low at 65.  I spoke with patient's wife and daughter at the bedside.  Patient was having nausea, vomiting and diarrhea for the last 5 days.  He is also tender in his abdomen on physical exam.  He is clinically dry and lethargic as well.  Will start on IV ciprofloxacin and Flagyl for acute gastroenteritis with proctitis.  Will continue to follow-up with the patient.  
PICC Line Insertion Procedure Note    Procedure: Insertion of #5FR Double PICC     Indications:  vesicants, multiple gtts    Procedure Details   Informed consent was obtained for the procedure.  Risks of hemorrhage, thrombus and adverse drug reaction were discussed. Vessel assessment revealed compressible vessels with no evidence of clot.     UE PICC placed without complication for patient.  2ml of Lidocaine 1% administered via infiltration prior to PICC insertion.  Time-Out performed at bedside with TABATHA Nicholson.      #5fr PICC inserted to the right brachial vein per hospital protocol.   Blood return:  yes    Catheter length 40 cm, with 0 cm exposed. Mid upper arm circumference is 25 cm.   Catheter was flushed with 20 cc NS. Patient did tolerate procedure well.    PICC tip confirmed in SVC with [employer] 3CG technology. Positive p wave without negative deflection noted on ECG.  ECG attached below.      Upon completion of procedure, all PICC kit components accounted for and intact.  Post procedure hand-off given to Nurse.  PICC Brochure given to patient with teaching instruction. Bed returned to low locked position with call bell in reach.      
Palliative Medicine  Patient Name: Aubrey Bell III  YOB: 1935  MRN: [id number]  Age: 89 y.o.  Gender: male    Date of Initial Consult: 5/12/2025  Date of Service: 5/19/2025  Time: 10:43 AM  Provider: SHABBIR Mejias - CNP  Hospital Day: 9  Admit Date: 5/11/2025  Referring Provider: Aubrey Grier DO      Reasons for Consultation:  Goals of Care and Other: Code status    HISTORY OF PRESENT ILLNESS (HPI):   Aubrey Bell III is a 89 y.o. male with a past medical history of hypertension and previous right PCA ischemic infarct, who was transferred from [facility] on 5/11/2025 with complaints of progressively worsening weakness, lethargy, diarrheal illness.     Psychosocial: Patient  to spouse, Fanny x 55 years - they have four adult children (Peace, Gregor, Jonny, and Glenny).  He worked many years as a  before retiring - he was also an avid       PALLIATIVE DIAGNOSES:    Acute hypoxic respiratory failure - Intubated  OLGA  Severe sepsis - Proctitis  Hypoalbuminemia  Physical debility  Goals of care  Advance care planning    ASSESSMENT AND PLAN:   Today, I am following up with Aubrey Bell III to address goals of care.  Reviewed medical chart including admit H&P, consultant notes, MAR, and recent labs/imaging.  Mr. Bell was seen and evaluated, patient's wife and daughter, Glenny at bedside.   At time of my arrival, he was resting in bed in no acute distress.  He remains intubated/ventilator support, off sedation and not waking up or following commands.  Patient noted to have worsening kidney function, decreased hemoglobin and failed SBT - intevisit requested to have comfort care discussion with family.  Discussed in detail with family today - wife declined blood transfusion - noting that \"he would not want to live like this - would not want to be prolonged on machines\"      Comfort Measures  Advised family that we d/c all supportive measures, including feeding tube - no 
Palliative Medicine  Patient Name: Aubrey Bell III  YOB: 1935  MRN: [id number]  Age: 89 y.o.  Gender: male    Date of Initial Consult: 5/12/2025  Date of Service: 5/14/2025  Time: 1:30 PM  Provider: SHABBIR Mejias - CNP  Hospital Day: 4  Admit Date: 5/11/2025  Referring Provider: Aubrey Grier DO      Reasons for Consultation:  Goals of Care and Other: Code status    HISTORY OF PRESENT ILLNESS (HPI):   Aubrey Bell III is a 89 y.o. male with a past medical history of hypertension and previous right PCA ischemic infarct, who was transferred from [facility] on 5/11/2025 with complaints of progressively worsening weakness, lethargy, diarrheal illness.     Psychosocial: Patient  to spouse, Fanny x 55 years - they have four adult children (Peace, Gregor, Jonny, and Glenny).  He worked many years as a  before retiring - he was also an avid       PALLIATIVE DIAGNOSES:    Acute hypoxic respiratory failure - Intubated  Severe sepsis - Proctitis  Hypoalbuminemia  Generalized weakness  Physical debility  Goals of care  Advance care planning    ASSESSMENT AND PLAN:   Today, I am following up with Aubrey Bell III to address goals of care.  Reviewed medical chart including admit H&P, consultant notes, MAR, and recent labs/imaging.  Mr. Bell was seen and evaluated, wife, daughter, and other family members at bedside.   At time of my arrival, he was resting in bed in no acute distress.  He remains intubated but now off pressors as of 3 a.m., but not following commands.   EEG done yesterday positive for seizure activity  Plans to start TF today - daughter is hopeful for progression once feedings are started.  Advised we will continue to monitor and take things day by day    Decisions/Goals:   Continue care    Code Status: Limited    ACP: No AMD on file (Spouse is LNOK)    Initial consult note routed to primary continuity provider and/or primary health care team members  Thanks 
Palliative Medicine  Patient Name: Aubrey Bell III  YOB: 1935  MRN: [id number]  Age: 89 y.o.  Gender: male    Date of Initial Consult: 5/12/2025  Date of Service: 5/16/2025  Time: 3:15 PM  Provider: SHABBIR Mejias - CNP  Hospital Day: 6  Admit Date: 5/11/2025  Referring Provider: Aubrey Grier DO      Reasons for Consultation:  Goals of Care and Other: Code status    HISTORY OF PRESENT ILLNESS (HPI):   Aubrey Bell III is a 89 y.o. male with a past medical history of hypertension and previous right PCA ischemic infarct, who was transferred from [facility] on 5/11/2025 with complaints of progressively worsening weakness, lethargy, diarrheal illness.     Psychosocial: Patient  to spouse, Fanny x 55 years - they have four adult children (Peace, Gregor, Jonny, and Glenny).  He worked many years as a  before retiring - he was also an avid       PALLIATIVE DIAGNOSES:    Acute hypoxic respiratory failure - Intubated  OLGA  Severe sepsis - Proctitis  Hypoalbuminemia  Physical debility  Goals of care  Advance care planning    ASSESSMENT AND PLAN:   Today, I am following up with Aubrey Bell III to address goals of care.  Reviewed medical chart including admit H&P, consultant notes, MAR, and recent labs/imaging.  Mr. Bell was seen and evaluated, no family at bedside.   At time of my arrival, he was resting in bed in no acute distress.  He remains intubated/ventilator support, off sedation and not waking up or following commands.  Patient noted to have worsening kidney function, and failed SBT - intevisit requested to have comfort care discussion with family.    Outgoing call placed to patient's daughter, Glenny - she stated that her mom is in town but they are not sure if they are coming to bed side this evening or tomorrow morning.    She noted that the family is still hopeful that he would wake up and make some gains.  Patient son is planning to come to bedside this 
Patient with interval development of new onset A-fib RVR and ongoing uptrending troponin.  Follow-up with cardiology feels insulted his secondary to demand ischemia at this time.    Plan:  -ECG confirms A-fib RVR  -Start diltiazem infusion-Target heart rate less than 100  -Monitor electrolytes and replace as indicated  -Will extend maintenance fluids for 5 hours, defer ongoing fluids to daytime attending  -Defer anticoagulation to cardiology/daytime attending    Aubrey Grier DO  [facility] [place]/ Nocturnist  5/12/2025 4:34 AM    Please do not hesitate to reach out to myself or assigned provider on-call via [employer] paging system with questions or concerns.     Nonbillable note  
Pharmacy Antimicrobial Kinetic Dosing    Indication for Antimicrobials: Sepsis, Gastroenteritis w/ Proctitis    Current Regimen of Each Antimicrobial:  Vancomycin Pharmacy to Dose; Start Date ; Day # 1  Zosyn 3.375g IV Q8H; Start Date ; Day # 1    Previous Antimicrobial Therapy:  Ciprofloxacin -  Metronidazole -    Goal Level: Vancomycin random level < 20     Date Dose & Interval Measured (mcg/mL) Predicted AUC 24-48 Predicted AUC 24,ss                          Significant Cultures:    C. Diff Toxin EIA: negative    Blood, paired: ngtd, prelim    Labs:  Recent Labs     Units 25  0801 25  0340 25  2140 25  0326 25  0602 05/10/25  1543   CREATININE MG/DL 1.68* 0.99 1.40* 1.27 1.23 1.30   BUN MG/DL 38* 31* 41* 40* 43* 37*   WBC K/uL  --  6.4 6.5 4.8 4.6 3.0*   BANDS %  --   --   --   --  17 2     Temp (24hrs), Av.2 °F (37.9 °C), Min:98.3 °F (36.8 °C), Max:101.5 °F (38.6 °C)    Conditions for Dosing Consideration:  OLGA     Creatinine Clearance (mL/min): Estimated Creatinine Clearance: 28 mL/min (A) (based on SCr of 1.68 mg/dL (H)).     Impression/Plan:   OLGA (baseline SCr 1-1.2) - Scr trending up, will pulse dose for now   Vancomycin 1750mg IV loading dose ordered  Predicted [id number] = N/A, Predicted AUC24,ss = N/A (pulse dosing)  Level scheduled  1200  Antimicrobial stop date 7 days     Pharmacy will follow daily and adjust medications as appropriate for renal function and/or serum levels.    Thank you,  CAROLIN GUTIÉRREZ [facility]  
Physical Therapy     Orders received and acknowledged, chart reviewed. Note pt with RRT 2/2 seizures, s/p transfer to ICU, +intubated/sedated. PT will defer at this time and continue to follow as medically appropriate and available.     Thank you,  PJ LADD, PT, DPT   
Progress Note  Date:2025       Room:[address] [place] [address]  Patient Name:Aubrey Bell III     YOB: 1935     Age:89 y.o.    BRIEF PATIENT SUMMARY   88 y/o male PMHx of Tonsillar cancer s/p chemo/radiation, chronic alcohol use,  R PCA stroke (residual ataxia), HTN, and HLD  admitted () by hospital medicine for sepsis secondary to acute gastroenteritis with proctitis after presenting with progressive weakness, lethargy and diarrhea x 1 week.  Cardiology consulted for elevated troponin and A-fib RVR on diltiazem infusion.  Rapid response evening  for upper extremity/facial twitching right facial droop, left gaze preference not crossing midline.  Per daughter facial droop/AMS had been occurring for over a day, rapid EEG applied +63 % seizure burden, administered Keppra and Ativan with seizure cessation.  Remained tachypneic, not protecting airway, patient's daughter updated at bedside advised okay with short-term intubation. ICU consulted     Initial ICU evaluation: Patient obtunded, tachypneic, shallow respirations, hypotensive, Afib RVR rates 140s , not protecting airway reconfirmed with daughter at bedside okay with short-term intubation , placed emergently on mechanical ventilation for airway protection.  CT/CTA H/N- pending    - Intubated over night , Seizure activity for 10 minutes was recorded overnight.       Subjective    Subjective:  Symptoms:  Stable.  (Intubated - not able to speak at this time).    Diet:  NPO.    Activity level: Activity impairment: Sedated.    Pain:  He reports no pain.       Review of Systems   Constitutional:  Positive for fever.        Patient is intubated and not able to chat at this time     Objective         Vitals Last 24 Hours:  TEMPERATURE:  Temp  Av.2 °F (37.9 °C)  Min: 98.3 °F (36.8 °C)  Max: 101.5 °F (38.6 °C)  RESPIRATIONS RANGE: Resp  Av.8  Min: 10  Max: 49  PULSE OXIMETRY RANGE: SpO2  Av %  Min: 89 %  Max: 100 %  PULSE RANGE: Pulse  Av.1 
Speech Pathology Contact Note:    SLP previously following for repeat clinical swallow evaluation. Per chart review, patient with rapid response yesterday d/t seizures, transferred to ICU, and intubated for airway protection. Patient currently remains intubated and sedated at this time. SLP will sign off. Please re-consult when patient medically appropriate. Thanks!     Glenny Ceja CCC-SLP    
Spiritual Health History and Assessment/Progress Note  [facility]    Family Care, Code (comment), Family Care (Intubated), Death, Grief and loss,      Name: Aubrey Bell III MRN: [id number]    Age: 89 y.o.     Sex: male   Language: English   Voodoo: Orthodox   OLGA (acute kidney injury)     Date: 5/19/2025            Total Time Calculated: 15 min              Spiritual Assessment continued in [facility] 2 CRITICAL CARE        Referral/Consult From: Nurse   Encounter Overview/Reason: Family Care  Service Provided For: Family    Paola, Belief, Meaning:   Patient unable to assess at this time  Family/Friends have beliefs or practices that help with coping during difficult times      Importance and Influence:  Patient unable to assess at this time  Family/Friends have spiritual/personal beliefs that influence decisions regarding the patient's health    Community:  Patient Other: N/A  Family/Friends feel well-supported. Support system includes: Parent/s and Children    Assessment and Plan of Care:     Patient Interventions include: Other: N/A  Family/Friends Interventions include: Facilitated expression of thoughts and feelings, Engaged in theological reflection, and Affirmed coping skills/support systems. Offered prayer and listening presence to loved ones (wife and daughter, Glenny, of Mr. Bell).     Patient Plan of Care: No future visits per patient/family request  Family/Friends Plan of Care: No future visits per patient/family request    Electronically signed by Chaplain FLO on 5/19/2025 at 2:19 PM   
Spiritual Health History and Assessment/Progress Note  [facility]    Crisis, Code (comment), Family Care (Intubated), Adjustment to illness,      Name: Aubrey Bell III MRN: [id number]    Age: 89 y.o.     Sex: male   Language: English   Samaritan: Hinduism   OLGA (acute kidney injury)     Date: 5/12/2025            Total Time Calculated: 70 min              Spiritual Assessment began in [facility] 2 CRITICAL CARE        Referral/Consult From: Nurse   Encounter Overview/Reason: Crisis  Service Provided For: Patient and family together    Paola, Belief, Meaning:   Patient unable to assess at this time  Family/Friends are connected with a paola tradition or spiritual practice and have beliefs or practices that help with coping during difficult times - Hindu      Importance and Influence:  Patient unable to assess at this time  Family/Friends have spiritual/personal beliefs that influence decisions regarding the patient's health    Community:  Patient Other:    Family/Friends feel well-supported. Support system includes: Parent/s and Children    Assessment and Plan of Care:     Patient Interventions include: Other:    Family/Friends Interventions include: Facilitated expression of thoughts and feelings, Explored spiritual coping/struggle/distress, Engaged in theological reflection, and Affirmed coping skills/support systems; Provided listening presence, prayer and pastoral support to daughter.     Patient Plan of Care: Spiritual Care available upon further referral  Family/Friends Plan of Care: Spiritual Care available upon further referral    Electronically signed by Chaplain FLO on 5/12/2025 at 10:51 PM   
Limited    ACP: No AMD on file (Spouse is LNOK)    Initial consult note routed to primary continuity provider and/or primary health care team members  Thanks for consulting palliative medicine in the care of Mr. Bell.  Please call with any palliative questions or concerns.  Palliative Care Team is available via perfect serve or via phone.    Referrals to:   [] Outpatient Palliative Care  [] Home Based Palliative Care  [] Home Based Primary Care  [] Hospice       ADVANCE CARE PLANNING:   [] [facility] Interdisciplinary Team has updated the ACP Navigator with Health Care Decision Maker and Patient Capacity      Primary Decision Maker: Fanny Bell - Spouse - [phone]  Confirm Advance Directive: None  Patient Would Like to Complete Advance Directive: No/refused    Current Code Status: Limited     Goals of Care: Goals of Care and Interventions  Patient/Health Care Proxy Stated Goals: Recovery from acute illness  Medical Interventions: Full interventions  Artificially Administered Nutrition: No feeding tube       Please refer to Palliative Medicine ACP notes for further details.    PALLIATIVE ASSESSMENT:      Palliative Performance Scale (PPS):  PPS: 30    ECOG:   ECOG Status : Limited self-care [3]    Modified ESAS:       Clinical Pain Assessment (nonverbal scale for severity on nonverbal patients):           NVPS:  Adult Nonverbal Pain Scale (NVPS)  Face: No particular expression or smile  Activity (Movement): Laid quietly, normal position  Guarding: Lying quietly, no positioning of hands over areas of bod  Physiology (Vital Signs): Stable vital signs  Respiratory: Baseline RR/SpO2 compliant with ventilator  NVPS Score : 1    RDOS:  RDOS  Heart rate per minute: greater than 109  Respiratory Rate per Minute: less than 19  Restlessness:non-purposeful: None  Paradoxical breathing pattern:abdomen moves in on inspiration: None  Accessory muscle use: rise in clavicle during inspiration: None  Grunting at 
mcg  50 mcg IntraVENous Q1H PRN    acetaminophen (TYLENOL) tablet 650 mg  650 mg Oral Q4H PRN    acetaminophen (TYLENOL) suppository 650 mg  650 mg Rectal Q4H PRN        Cliff Luo MD  5/19/2025                                                                                                               
IntraVENous PRN    0.9 % sodium chloride infusion   IntraVENous PRN    thiamine mononitrate tablet 100 mg  100 mg Oral Daily    multivitamin 1 tablet  1 tablet Oral Daily    folic acid (FOLVITE) tablet 1 mg  1 mg Oral Daily    balsum peru-castor oil (VENELEX) ointment   Topical BID    famotidine (PEPCID) 20 MG/2ML 20 mg in sodium chloride (PF) 0.9 % 10 mL injection  20 mg IntraVENous Daily    acetaminophen (TYLENOL) tablet 650 mg  650 mg Oral Q4H PRN    ciprofloxacin (CIPRO) IVPB 400 mg  400 mg IntraVENous Q12H    metroNIDAZOLE (FLAGYL) 500 mg in 0.9% NaCl 100 mL IVPB premix  500 mg IntraVENous Q8H    acetaminophen (TYLENOL) suppository 650 mg  650 mg Rectal Q4H PRN     ______________________________________________________________________  EXPECTED LENGTH OF STAY: 4  ACTUAL LENGTH OF STAY:          2                 Yael Marley MD   Pulmonary/[facility]    
Phosphorus 2.1 (L) 2.6 - 4.7 MG/DL   Hepatic Function Panel    Collection Time: 05/15/25  2:50 AM   Result Value Ref Range    Total Protein 4.0 (L) 6.4 - 8.2 g/dL    Albumin 1.7 (L) 3.5 - 5.0 g/dL    Globulin 2.3 2.0 - 4.0 g/dL    Albumin/Globulin Ratio 0.7 (L) 1.1 - 2.2      Total Bilirubin 0.4 0.2 - 1.0 MG/DL    Bilirubin, Direct 0.3 (H) 0.0 - 0.2 MG/DL    Alk Phosphatase 66 45 - 117 U/L     (H) 15 - 37 U/L    ALT 82 (H) 12 - 78 U/L   Ammonia    Collection Time: 05/15/25  2:50 AM   Result Value Ref Range    Ammonia 34 (H) <32 UMOL/L       Imaging:    MRI BRAIN WO CONTRAST   Final Result   Stable MR imaging appearance of the brain without evidence for acute infarction.      Electronically signed by John Suarez MD      US ABDOMEN LIMITED   Final Result      1. Interval development of a small right pleural effusion.   2. Interval development of mild gallbladder wall thickening with a mild amount   of pericholecystic fluid.            Electronically signed by CAL BURGOS      CT HEAD WO CONTRAST   Final Result      No acute intracranial abnormality.         Electronically signed by Dylan Parsons      CTA HEAD NECK W CONTRAST   Final Result   1. No large vessel occlusion. Focal high-grade stenosis along the right PCA,   representing interval resolution of previously demonstrated chronic right PCA   occlusion.   2. 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      3. No obvious intracranial mass or enhancing lesion.   Additional chronic and incidental findings as noted.         A preliminary report was provided by the on-call radiologist as documented   above.      Electronically signed by TRAMAINE BERTRAND      XR CHEST PORTABLE   Final Result      Satisfactory endotracheal tube and enteric tube placement. Clear lungs.         Electronically signed by Dylan MILES 
irregular rhythm,  No edema  GI:  Soft, Non distended, Non tender.  +Bowel sounds  Neurologic:  Lethargic and limited exam  Skin:  No rashes.  No jaundice    Reviewed most current lab test results and cultures  YES  Reviewed most current radiology test results   YES  Review and summation of old records today    NO  Reviewed patient's current orders and MAR    YES  PMH/SH reviewed - no change compared to H&P    Procedures: see electronic medical records for all procedures/Xrays and details which were not copied into this note but were reviewed prior to creation of Plan.      LABS:  I reviewed today's most current labs and imaging studies.  Pertinent labs include:  Recent Labs     05/10/25  1543 05/11/25  0602 05/12/25  0326   WBC 3.0* 4.6 4.8   HGB 15.3 15.4 14.8   HCT 43.9 45.3 44.1   PLT 75* 65* 67*     Recent Labs     05/10/25  1543 05/11/25  0602 05/11/25  0717 05/12/25  0326   * 136  --  140   K 4.5 3.8  --  4.3    106  --  108   CO2 27 19*  --  26   GLUCOSE 136* 121*  --  161*   BUN 37* 43*  --  40*   CREATININE 1.30 1.23  --  1.27   CALCIUM 8.4* 8.2*  --  8.4*   MG 1.7 2.1  --  2.0   PHOS  --   --   --  2.2*   BILITOT 0.4 0.4  --  0.4   * 198*  --  229*   ALT 49 53  --  56   INR 1.0  --  1.1  --        Signed: Chata Dukes MD          
- 36.5 g/dL    RDW 16.7 (H) 11.5 - 14.5 %    Platelets 57 (L) 150 - 400 K/uL    Nucleated RBCs 0.0 0  WBC    nRBC 0.00 0.00 - 0.01 K/uL   Protime-INR    Collection Time: 05/18/25  5:18 AM   Result Value Ref Range    INR 1.0 0.9 - 1.1      Protime 10.9 9.2 - 11.2 sec   APTT    Collection Time: 05/18/25  5:18 AM   Result Value Ref Range    APTT 50.6 (H) 22.1 - 31.0 sec    Therapeutic Range   58.0 - 77.0 SECS   Fibrinogen    Collection Time: 05/18/25  5:18 AM   Result Value Ref Range    Fibrinogen 236 200 - 475 mg/dL   Lactate Dehydrogenase    Collection Time: 05/18/25  5:18 AM   Result Value Ref Range     (H) 85 - 241 U/L   POCT Glucose    Collection Time: 05/18/25  5:27 AM   Result Value Ref Range    POC Glucose 277 (H) 65 - 117 mg/dL    Performed by: Cynthia Calvo RN    TYPE AND SCREEN    Collection Time: 05/18/25  5:44 AM   Result Value Ref Range    Crossmatch expiration date 05/21/2025,2359     ABO/Rh A POSITIVE     Antibody Screen NEG     Unit Number [id number]     Product Code Blood Bank RC LR     Unit Divison 00     Dispense Status Blood Bank ALLOCATED     Crossmatch Result Compatible    PREPARE RBC (CROSSMATCH), 1 Units    Collection Time: 05/18/25  5:45 AM   Result Value Ref Range    History Check Historical check performed        Imaging:    MRI BRAIN WO CONTRAST   Final Result   Stable MR imaging appearance of the brain without evidence for acute infarction.      Electronically signed by John Suarez MD      US ABDOMEN LIMITED   Final Result      1. Interval development of a small right pleural effusion.   2. Interval development of mild gallbladder wall thickening with a mild amount   of pericholecystic fluid.            Electronically signed by CAL BURGOS      CT HEAD WO CONTRAST   Final Result      No acute intracranial abnormality.         Electronically signed by Dylan Parsons      CTA HEAD NECK W CONTRAST   Final Result   1. No large vessel occlusion. Focal high-grade stenosis

## 2025-05-19 NOTE — DEATH NOTES
Death Pronouncement Note  Patient's Name: Aubrey Bell III   Patient's YOB: 1935  MRN Number: [id number]    Admitting Provider: Aubrey Grier DO  Attending Provider: No att. providers found    Patient was examined and the following were absent: Pulses, Blood Pressure, and Respiratory effort    I declared the patient dead on  at 13:23    Preliminary Cause of Death:   -Respiratory failure from Seizure.      Electronically signed by Timothy Ramon MD on 5/19/25 at 4:08 PM EDT

## 2025-05-20 LAB
ABO + RH BLD: NORMAL
BLD PROD TYP BPU: NORMAL
BLOOD BANK DISPENSE STATUS: NORMAL
BLOOD GROUP ANTIBODIES SERPL: NORMAL
BPU ID: NORMAL
CROSSMATCH RESULT: NORMAL
SPECIMEN EXP DATE BLD: NORMAL
UNIT DIVISION: 0

## (undated) DEVICE — ENDO CARRY-ON PROCEDURE KIT INCLUDES ENZYMATIC SPONGE, GAUZE, BIOHAZARD LABEL, TRAY, LUBRICANT, DIRTY SCOPE LABEL, WATER LABEL, TRAY, DRAWSTRING PAD, AND DEFENDO 4-PIECE KIT.: Brand: ENDO CARRY-ON PROCEDURE KIT

## (undated) DEVICE — BW-412T DISP COMBO CLEANING BRUSH: Brand: SINGLE USE COMBINATION CLEANING BRUSH

## (undated) DEVICE — BW-400L DISP SNGL-END CLEANINGBRUSH: Brand: OLYMPUS

## (undated) DEVICE — 1200 GUARD II KIT W/5MM TUBE W/O VAC TUBE: Brand: GUARDIAN

## (undated) DEVICE — SET EXTN TBNG L BOR 4 W STPCOCK ST 32IN PRIMING VOL 6ML

## (undated) DEVICE — KIT COMPLIANCE W ENDOGLDE + 11 NO BRSH ENDOKT

## (undated) DEVICE — NEONATAL-ADULT SPO2 SENSOR: Brand: NELLCOR

## (undated) DEVICE — SYSTEM BX DIA22GA FN W/ PRE LD NDL SHARKCORE

## (undated) DEVICE — FORCEPS BX L240CM JAW DIA2.8MM L CAP W/ NDL MIC MESH TOOTH

## (undated) DEVICE — NEEDLE HYPO 18GA L1.5IN PNK S STL HUB POLYPR SHLD REG BVL

## (undated) DEVICE — CONTAINER SPEC 20 ML LID NEUT BUFF FORMALIN 10 % POLYPR STS

## (undated) DEVICE — BAG SPEC BIOHZD LF 2MIL 6X10IN -- CONVERT TO ITEM 357326

## (undated) DEVICE — SYRINGE MED 20ML STD CLR PLAS LUERLOCK TIP N CTRL DISP

## (undated) DEVICE — CANN NASAL O2 CAPNOGRAPHY AD -- FILTERLINE

## (undated) DEVICE — Device: Brand: SINGLE USE SOFT BRUSH

## (undated) DEVICE — KENDALL RADIOLUCENT FOAM MONITORING ELECTRODE -RECTANGULAR SHAPE: Brand: KENDALL

## (undated) DEVICE — CATH IV AUTOGRD BC BLU 22GA 25 -- INSYTE

## (undated) DEVICE — KIT IV STRT W CHLORAPREP PD 1ML

## (undated) DEVICE — SOLIDIFIER FLUID 3000 CC ABSORB

## (undated) DEVICE — SET ADMIN 16ML TBNG L100IN 2 Y INJ SITE IV PIGGY BK DISP

## (undated) DEVICE — BAG BELONG PT PERS CLEAR HANDL